# Patient Record
Sex: FEMALE | Race: WHITE | Employment: OTHER | URBAN - METROPOLITAN AREA
[De-identification: names, ages, dates, MRNs, and addresses within clinical notes are randomized per-mention and may not be internally consistent; named-entity substitution may affect disease eponyms.]

---

## 2017-10-19 ENCOUNTER — HOSPITAL ENCOUNTER (OUTPATIENT)
Dept: MRI IMAGING | Age: 77
Discharge: HOME | End: 2017-10-19
Attending: ORTHOPAEDIC SURGERY
Payer: MEDICARE

## 2017-10-19 DIAGNOSIS — M54.41 ACUTE RIGHT-SIDED BACK PAIN WITH SCIATICA: ICD-10-CM

## 2017-10-19 PROCEDURE — 72148 MRI LUMBAR SPINE W/O DYE: CPT

## 2018-02-05 ENCOUNTER — HOSPITAL ENCOUNTER (OUTPATIENT)
Dept: LAB | Age: 78
Discharge: HOME | End: 2018-02-05

## 2019-03-09 ENCOUNTER — HOSPITAL ENCOUNTER (EMERGENCY)
Age: 79
Discharge: HOME | End: 2019-03-09
Attending: EMERGENCY MEDICINE | Admitting: EMERGENCY MEDICINE
Payer: MEDICARE

## 2019-03-09 ENCOUNTER — APPOINTMENT (OUTPATIENT)
Dept: GENERAL RADIOLOGY | Age: 79
End: 2019-03-09
Attending: EMERGENCY MEDICINE
Payer: MEDICARE

## 2019-03-09 ENCOUNTER — APPOINTMENT (OUTPATIENT)
Dept: CT IMAGING | Age: 79
End: 2019-03-09
Attending: EMERGENCY MEDICINE
Payer: MEDICARE

## 2019-03-09 VITALS
RESPIRATION RATE: 20 BRPM | WEIGHT: 138 LBS | HEIGHT: 64 IN | TEMPERATURE: 97.8 F | SYSTOLIC BLOOD PRESSURE: 138 MMHG | OXYGEN SATURATION: 91 % | DIASTOLIC BLOOD PRESSURE: 71 MMHG | BODY MASS INDEX: 23.56 KG/M2 | HEART RATE: 103 BPM

## 2019-03-09 DIAGNOSIS — N30.00 ACUTE CYSTITIS WITHOUT HEMATURIA: Primary | ICD-10-CM

## 2019-03-09 DIAGNOSIS — R41.0 TRANSIENT CONFUSION: ICD-10-CM

## 2019-03-09 LAB
ALBUMIN SERPL-MCNC: 3.1 G/DL (ref 3.5–5)
ALBUMIN/GLOB SERPL: 0.9 {RATIO} (ref 1.1–2.2)
ALP SERPL-CCNC: 43 U/L (ref 45–117)
ALT SERPL-CCNC: 25 U/L (ref 12–78)
ANION GAP SERPL CALC-SCNC: 10 MMOL/L (ref 5–15)
APPEARANCE UR: ABNORMAL
AST SERPL-CCNC: 32 U/L (ref 15–37)
ATRIAL RATE: 98 BPM
BACTERIA URNS QL MICRO: ABNORMAL /HPF
BASOPHILS # BLD: 0 K/UL (ref 0–0.1)
BASOPHILS NFR BLD: 0 % (ref 0–1)
BILIRUB SERPL-MCNC: 0.2 MG/DL (ref 0.2–1)
BILIRUB UR QL: NEGATIVE
BUN SERPL-MCNC: 12 MG/DL (ref 6–20)
BUN/CREAT SERPL: 17 (ref 12–20)
CALCIUM SERPL-MCNC: 7.7 MG/DL (ref 8.5–10.1)
CALCULATED P AXIS, ECG09: 47 DEGREES
CALCULATED R AXIS, ECG10: -41 DEGREES
CALCULATED T AXIS, ECG11: 106 DEGREES
CHLORIDE SERPL-SCNC: 102 MMOL/L (ref 97–108)
CO2 SERPL-SCNC: 23 MMOL/L (ref 21–32)
COLOR UR: ABNORMAL
CREAT SERPL-MCNC: 0.7 MG/DL (ref 0.55–1.02)
DIAGNOSIS, 93000: NORMAL
DIFFERENTIAL METHOD BLD: ABNORMAL
EOSINOPHIL # BLD: 0 K/UL (ref 0–0.4)
EOSINOPHIL NFR BLD: 0 % (ref 0–7)
EPITH CASTS URNS QL MICRO: ABNORMAL /LPF
ERYTHROCYTE [DISTWIDTH] IN BLOOD BY AUTOMATED COUNT: 13.2 % (ref 11.5–14.5)
GLOBULIN SER CALC-MCNC: 3.6 G/DL (ref 2–4)
GLUCOSE SERPL-MCNC: 116 MG/DL (ref 65–100)
GLUCOSE UR STRIP.AUTO-MCNC: NEGATIVE MG/DL
HCT VFR BLD AUTO: 32.5 % (ref 35–47)
HGB BLD-MCNC: 11.4 G/DL (ref 11.5–16)
HGB UR QL STRIP: NEGATIVE
HYALINE CASTS URNS QL MICRO: ABNORMAL /LPF (ref 0–5)
IMM GRANULOCYTES # BLD AUTO: 0 K/UL (ref 0–0.04)
IMM GRANULOCYTES NFR BLD AUTO: 0 % (ref 0–0.5)
KETONES UR QL STRIP.AUTO: NEGATIVE MG/DL
LEUKOCYTE ESTERASE UR QL STRIP.AUTO: ABNORMAL
LYMPHOCYTES # BLD: 1 K/UL (ref 0.8–3.5)
LYMPHOCYTES NFR BLD: 19 % (ref 12–49)
MCH RBC QN AUTO: 32.3 PG (ref 26–34)
MCHC RBC AUTO-ENTMCNC: 35.1 G/DL (ref 30–36.5)
MCV RBC AUTO: 92.1 FL (ref 80–99)
MONOCYTES # BLD: 0.9 K/UL (ref 0–1)
MONOCYTES NFR BLD: 17 % (ref 5–13)
NEUTS SEG # BLD: 3.2 K/UL (ref 1.8–8)
NEUTS SEG NFR BLD: 64 % (ref 32–75)
NITRITE UR QL STRIP.AUTO: POSITIVE
NRBC # BLD: 0 K/UL (ref 0–0.01)
NRBC BLD-RTO: 0 PER 100 WBC
P-R INTERVAL, ECG05: 160 MS
PH UR STRIP: 5.5 [PH] (ref 5–8)
PLATELET # BLD AUTO: 156 K/UL (ref 150–400)
PMV BLD AUTO: 11.3 FL (ref 8.9–12.9)
POTASSIUM SERPL-SCNC: 3.3 MMOL/L (ref 3.5–5.1)
PROT SERPL-MCNC: 6.7 G/DL (ref 6.4–8.2)
PROT UR STRIP-MCNC: NEGATIVE MG/DL
Q-T INTERVAL, ECG07: 404 MS
QRS DURATION, ECG06: 142 MS
QTC CALCULATION (BEZET), ECG08: 515 MS
RBC # BLD AUTO: 3.53 M/UL (ref 3.8–5.2)
RBC #/AREA URNS HPF: ABNORMAL /HPF (ref 0–5)
SODIUM SERPL-SCNC: 135 MMOL/L (ref 136–145)
SP GR UR REFRACTOMETRY: 1.01 (ref 1–1.03)
TROPONIN I SERPL-MCNC: <0.05 NG/ML
UA: UC IF INDICATED,UAUC: ABNORMAL
UROBILINOGEN UR QL STRIP.AUTO: 0.2 EU/DL (ref 0.2–1)
VENTRICULAR RATE, ECG03: 98 BPM
WBC # BLD AUTO: 5.1 K/UL (ref 3.6–11)
WBC URNS QL MICRO: ABNORMAL /HPF (ref 0–4)

## 2019-03-09 PROCEDURE — 81001 URINALYSIS AUTO W/SCOPE: CPT

## 2019-03-09 PROCEDURE — 70450 CT HEAD/BRAIN W/O DYE: CPT

## 2019-03-09 PROCEDURE — 71045 X-RAY EXAM CHEST 1 VIEW: CPT

## 2019-03-09 PROCEDURE — 74011250636 HC RX REV CODE- 250/636: Performed by: EMERGENCY MEDICINE

## 2019-03-09 PROCEDURE — 87186 SC STD MICRODIL/AGAR DIL: CPT

## 2019-03-09 PROCEDURE — 84484 ASSAY OF TROPONIN QUANT: CPT

## 2019-03-09 PROCEDURE — 80053 COMPREHEN METABOLIC PANEL: CPT

## 2019-03-09 PROCEDURE — 93005 ELECTROCARDIOGRAM TRACING: CPT

## 2019-03-09 PROCEDURE — 74011000258 HC RX REV CODE- 258: Performed by: EMERGENCY MEDICINE

## 2019-03-09 PROCEDURE — 87086 URINE CULTURE/COLONY COUNT: CPT

## 2019-03-09 PROCEDURE — 85025 COMPLETE CBC W/AUTO DIFF WBC: CPT

## 2019-03-09 PROCEDURE — 36415 COLL VENOUS BLD VENIPUNCTURE: CPT

## 2019-03-09 PROCEDURE — 87077 CULTURE AEROBIC IDENTIFY: CPT

## 2019-03-09 PROCEDURE — 74011250637 HC RX REV CODE- 250/637: Performed by: EMERGENCY MEDICINE

## 2019-03-09 PROCEDURE — 96365 THER/PROPH/DIAG IV INF INIT: CPT

## 2019-03-09 PROCEDURE — 99285 EMERGENCY DEPT VISIT HI MDM: CPT

## 2019-03-09 RX ORDER — CEPHALEXIN 500 MG/1
500 CAPSULE ORAL 4 TIMES DAILY
Qty: 28 CAP | Refills: 0 | Status: SHIPPED | OUTPATIENT
Start: 2019-03-09 | End: 2019-03-16

## 2019-03-09 RX ORDER — METOPROLOL TARTRATE 25 MG/1
25 TABLET, FILM COATED ORAL
Status: COMPLETED | OUTPATIENT
Start: 2019-03-09 | End: 2019-03-09

## 2019-03-09 RX ORDER — CEFOTETAN DISODIUM 1 G/10ML
1 INJECTION, POWDER, FOR SOLUTION INTRAMUSCULAR; INTRAVENOUS
Status: DISCONTINUED | OUTPATIENT
Start: 2019-03-09 | End: 2019-03-09 | Stop reason: HOSPADM

## 2019-03-09 RX ADMIN — CEFOXITIN SODIUM 1 G: 1 POWDER, FOR SOLUTION INTRAVENOUS at 07:36

## 2019-03-09 RX ADMIN — METOPROLOL TARTRATE 25 MG: 25 TABLET ORAL at 06:24

## 2019-03-09 NOTE — ED NOTES
MD Melvi Hutchinson have reviewed discharge instructions with the patient and parent . The patient and parent verbalized understanding. Pt leaving via w/c and  driving pt home.

## 2019-03-09 NOTE — DISCHARGE INSTRUCTIONS
Patient Education        Urinary Tract Infection in Women: Care Instructions  Your Care Instructions    A urinary tract infection, or UTI, is a general term for an infection anywhere between the kidneys and the urethra (where urine comes out). Most UTIs are bladder infections. They often cause pain or burning when you urinate. UTIs are caused by bacteria and can be cured with antibiotics. Be sure to complete your treatment so that the infection goes away. Follow-up care is a key part of your treatment and safety. Be sure to make and go to all appointments, and call your doctor if you are having problems. It's also a good idea to know your test results and keep a list of the medicines you take. How can you care for yourself at home? · Take your antibiotics as directed. Do not stop taking them just because you feel better. You need to take the full course of antibiotics. · Drink extra water and other fluids for the next day or two. This may help wash out the bacteria that are causing the infection. (If you have kidney, heart, or liver disease and have to limit fluids, talk with your doctor before you increase your fluid intake.)  · Avoid drinks that are carbonated or have caffeine. They can irritate the bladder. · Urinate often. Try to empty your bladder each time. · To relieve pain, take a hot bath or lay a heating pad set on low over your lower belly or genital area. Never go to sleep with a heating pad in place. To prevent UTIs  · Drink plenty of water each day. This helps you urinate often, which clears bacteria from your system. (If you have kidney, heart, or liver disease and have to limit fluids, talk with your doctor before you increase your fluid intake.)  · Urinate when you need to. · Urinate right after you have sex. · Change sanitary pads often. · Avoid douches, bubble baths, feminine hygiene sprays, and other feminine hygiene products that have deodorants.   · After going to the bathroom, wipe from front to back. When should you call for help? Call your doctor now or seek immediate medical care if:    · Symptoms such as fever, chills, nausea, or vomiting get worse or appear for the first time.     · You have new pain in your back just below your rib cage. This is called flank pain.     · There is new blood or pus in your urine.     · You have any problems with your antibiotic medicine.    Watch closely for changes in your health, and be sure to contact your doctor if:    · You are not getting better after taking an antibiotic for 2 days.     · Your symptoms go away but then come back. Where can you learn more? Go to http://rosanne-corrie.info/. Enter C958 in the search box to learn more about \"Urinary Tract Infection in Women: Care Instructions. \"  Current as of: March 20, 2018  Content Version: 11.9  © 1291-2276 [employer]. Care instructions adapted under license by [employer] (which disclaims liability or warranty for this information). If you have questions about a medical condition or this instruction, always ask your healthcare professional. [place] [address] any warranty or liability for your use of this information. Patient Education        Learning About Delirium  What is delirium? Delirium is a sudden change in mental condition. It leads to confusion and unusual behavior. Delirium is also called acute confusional state. Delirium affects all age groups. It can result from problems that affect the brain, such as stroke. It can also happen after an infection or when using certain medicines. Pain may also cause the problem. Seeing delirium in a loved one can be scary and sad. But it will go away most of the time. It usually lasts hours to days. The doctor will look for a cause and take steps to treat it and keep your loved one comfortable. What are the symptoms?   Symptoms of delirium usually develop over several hours to a few days. Symptoms may change and be more or less severe. Symptoms include:  · A short attention span. · Confusion. This is not knowing where you are, what time it is, or who others are. · Hallucinations. This usually is seeing or hearing things that are not really there. · Delusions. This is believing things that aren't true. · Illusions. This is making a mistake in what you think is real. For example, you think a child is crying, but it's a pillow. · Disorganized thinking. How is delirium treated? The doctor may:  · Find and treat the cause. This could be:  ? Not getting enough fluids. ? An infection. ? A medicine or combination of medicines. ? Another medical problem. · Prescribe a medicine. · Make the hospital room as quiet as possible. You may be able to help your loved one by being present and talking to and touching him or her. Follow-up care is a key part of your treatment and safety. Be sure to make and go to all appointments, and call your doctor if you are having problems. It's also a good idea to know your test results and keep a list of the medicines you take. Where can you learn more? Go to http://rosanne-corrie.info/. Enter B624 in the search box to learn more about \"Learning About Delirium. \"  Current as of: September 11, 2018  Content Version: 11.9  © 3379-0802 [employer]. Care instructions adapted under license by [employer] (which disclaims liability or warranty for this information). If you have questions about a medical condition or this instruction, always ask your healthcare professional. [place] [address] any warranty or liability for your use of this information.

## 2019-03-09 NOTE — ED PROVIDER NOTES
EMERGENCY DEPARTMENT HISTORY AND PHYSICAL EXAM           Date: 3/9/2019  Patient Name: Cornelio Ardon    History of Presenting Illness     Chief Complaint   Patient presents with    Fatigue       History Provided By: Patient and Patient's     HPI: Cornelio Ardon is a [de-identified] y.o. female, pmhx TIA, breast cancer, HTN, high cholesterol, GERD, arthritis who presents via EMS to the ED c/o sudden onset of confusion just PTA in which pt was confused about her location. Pt is unsure how long the episode lasted. Per , the pt was disoriented and in a \"dream-like state. \" He relays a history of TIA. Patient additionally c/o generalized weakness, fatigue, congestion and cough. She specifically denies any recent chills, nausea, vomiting, diarrhea, abd pain, CP, SOB, urinary sxs, changes in BM, or headache. PCP: Rogelio Snyder MD     Allergies: Losartan, augmentin, contrast agent, tetracycline   PMHx: Significant for TIA, breast cancer, HTN, high cholesterol, GERD, arthritis  PSHx: Significant for back surgery, tubal ligation, mastectomy, lithotripsy, D&C  Social Hx: -tobacco  -EtOH , -Illicit Drugs    There are no other complaints, changes, or physical findings at this time. Current Facility-Administered Medications   Medication Dose Route Frequency Provider Last Rate Last Dose    cefoTEtan (CEFOTAN) injection 1 g  1 g IntraVENous NOW Rashad Bishop MD        cefOXitin (MEFOXIN) 1 g in 0.9% sodium chloride (MBP/ADV) 50 mL  1 g IntraVENous ON CALL Rashad Bishop MD         Current Outpatient Medications   Medication Sig Dispense Refill    cephALEXin (KEFLEX) 500 mg capsule Take 1 Cap by mouth four (4) times daily for 7 days. 28 Cap 0    guaiFENesin 200 mg/5 mL liqd Take 5 mL by mouth every four (4) hours as needed for Cough. 118 mL 0    esomeprazole (NEXIUM) 40 mg capsule Take  by mouth daily (with dinner).  MULTIVITAMIN PO Take  by mouth daily.       B.infantis-B.ani-B.long-B.bifi (PROBIOTIC 4X) 10-15 mg TbEC Take  by mouth daily.  amoxicillin 500 mg tab Take  by mouth. 500 MG; TAKE 4 TABS PRIOR TO DENTAL WORK      hydrochlorothiazide (HYDRODIURIL) 25 mg tablet Take 25 mg by mouth daily.  metoprolol (LOPRESSOR) 50 mg tablet Take 1 Tab by mouth two (2) times a day. (Patient taking differently: Take 25 mg by mouth two (2) times a day.) 20 Tab 0    aspirin 81 mg tablet Take 81 mg by mouth daily.  calcium carbonate 1,000 mg Tab Take 1 Tab by mouth daily.  OMEGA-3/DHA/EPA/FISH OIL (FISH OIL HIGH POTENCY PO) Take  by mouth two (2) times a day.          Past History     Past Medical History:  Past Medical History:   Diagnosis Date    Arthritis     SPINE    Cancer ([facility] [address]     right breast ; [facility]    Environmental allergies     GERD (gastroesophageal reflux disease)     Hypercholesterolemia     Hypertension     Nausea & vomiting     Other ill-defined conditions(799.89)     high cholesterol    Other ill-defined conditions(799.89)     NO BP/VENIPUNCTURES RIGHT ARM (POST LYMPH NODE DISSECTION)    Other ill-defined conditions(799.89)     MITRAL VALVE PROLAPSE    Stroke ([facility] [address] [facility]     TIA 2009  (TAKING ASA)       Past Surgical History:  Past Surgical History:   Procedure Laterality Date    HX BACK SURGERY  1998    l4-l5  ([facility] [place]    HX BREAST LUMPECTOMY  1999    right    HX BREAST RECONSTRUCTION Right 8/27/2014    Revision Right Reconstructed Breast performed by Mj Aguilar MD at [address] [place] [address] Bilateral 2/20/2015    REVISION RIGHT RECONSTRUCTED BREAST/REMOVE TISSUE EXPANDERS/PLACE IMPLANT RIGHT/MASTOPEXY LEFT performed by Mj Aguilar MD at [address] HX GI  2006    COLONOSCOPY    HX LITHOTRIPSY  1991    HX MASTECTOMY Right 2014    HX ORTHOPAEDIC  2005    left bunionectomy    HX TUBAL LIGATION      HX UROLOGICAL      kidney stone       Family History:  Family History   Problem Relation Age of Onset    Heart Disease Mother     Heart Disease Brother     Cancer Brother         PROSTATE    Heart Disease Maternal Aunt     Cancer Father         LUNG--ASBESTOS EXPOSURE    Anesth Problems Neg Hx     Heart Disease Brother        Social History:  Social History     Tobacco Use    Smoking status: Never Smoker    Smokeless tobacco: Never Used   Substance Use Topics    Alcohol use: No    Drug use: No       Allergies: Allergies   Allergen Reactions    Losartan Anaphylaxis    Other Medication Anaphylaxis     Allergy shot     Augmentin [Amoxicillin-Pot Clavulanate] Rash    Contrast Agent [Iodine] Hives     Face and neck red after adminstration    Tetracycline Rash         Review of Systems   Review of Systems   Constitutional: Positive for fatigue. Negative for fever. HENT: Positive for congestion. Eyes: Negative. Respiratory: Positive for cough. Negative for shortness of breath. Cardiovascular: Negative for chest pain. Gastrointestinal: Negative for abdominal pain and nausea. Endocrine: Negative. Genitourinary: Negative. Negative for difficulty urinating, dysuria and hematuria. Musculoskeletal: Negative. Skin: Negative. Neurological: Positive for weakness. Psychiatric/Behavioral: Positive for confusion. Negative for suicidal ideas. All other systems reviewed and are negative. Physical Exam   Physical Exam   Constitutional: She is oriented to person, place, and time. She appears well-developed and well-nourished. No distress. HENT:   Head: Normocephalic and atraumatic. Nose: Nose normal.   Eyes: Conjunctivae and EOM are normal. Pupils are equal, round, and reactive to light. No scleral icterus. Neck: Normal range of motion. No tracheal deviation present. Cardiovascular: Normal rate, regular rhythm, normal heart sounds and intact distal pulses. Exam reveals no friction rub. No murmur heard.   Pulmonary/Chest: Effort normal and breath sounds normal. No stridor. No respiratory distress. She has no wheezes. She has no rales. Abdominal: Soft. Bowel sounds are normal. She exhibits no distension. There is no tenderness. There is no rebound. Musculoskeletal: Normal range of motion. She exhibits no tenderness. Neurological: She is alert and oriented to person, place, and time. She is not disoriented. No cranial nerve deficit or sensory deficit. She exhibits normal muscle tone. Coordination and gait normal. GCS eye subscore is 4. GCS verbal subscore is 5. GCS motor subscore is 6. Skin: Skin is warm and dry. No rash noted. She is not diaphoretic. Psychiatric: She has a normal mood and affect. Her speech is normal and behavior is normal. Judgment and thought content normal. Cognition and memory are normal.   Nursing note and vitals reviewed. Diagnostic Study Results     Labs -  Recent Results (from the past 12 hour(s))   EKG, 12 LEAD, INITIAL    Collection Time: 03/09/19  3:10 AM   Result Value Ref Range    Ventricular Rate 98 BPM    Atrial Rate 98 BPM    P-R Interval 160 ms    QRS Duration 142 ms    Q-T Interval 404 ms    QTC Calculation (Bezet) 515 ms    Calculated P Axis 47 degrees    Calculated R Axis -41 degrees    Calculated T Axis 106 degrees    Diagnosis       Normal sinus rhythm  Left axis deviation  Left bundle branch block  When compared with ECG of 22-AUG-2014 10:16,  Vent.  rate has increased BY  38 BPM  T wave inversion less evident in Lateral leads  QT has lengthened     CBC WITH AUTOMATED DIFF    Collection Time: 03/09/19  3:20 AM   Result Value Ref Range    WBC 5.1 3.6 - 11.0 K/uL    RBC 3.53 (L) 3.80 - 5.20 M/uL    HGB 11.4 (L) 11.5 - 16.0 g/dL    HCT 32.5 (L) 35.0 - 47.0 %    MCV 92.1 80.0 - 99.0 FL    MCH 32.3 26.0 - 34.0 PG    MCHC 35.1 30.0 - 36.5 g/dL    RDW 13.2 11.5 - 14.5 %    PLATELET 761 289 - 379 K/uL    MPV 11.3 8.9 - 12.9 FL    NRBC 0.0 0  WBC    ABSOLUTE NRBC 0.00 0.00 - 0.01 K/uL    NEUTROPHILS 64 32 - 75 % LYMPHOCYTES 19 12 - 49 %    MONOCYTES 17 (H) 5 - 13 %    EOSINOPHILS 0 0 - 7 %    BASOPHILS 0 0 - 1 %    IMMATURE GRANULOCYTES 0 0.0 - 0.5 %    ABS. NEUTROPHILS 3.2 1.8 - 8.0 K/UL    ABS. LYMPHOCYTES 1.0 0.8 - 3.5 K/UL    ABS. MONOCYTES 0.9 0.0 - 1.0 K/UL    ABS. EOSINOPHILS 0.0 0.0 - 0.4 K/UL    ABS. BASOPHILS 0.0 0.0 - 0.1 K/UL    ABS. IMM. GRANS. 0.0 0.00 - 0.04 K/UL    DF AUTOMATED     METABOLIC PANEL, COMPREHENSIVE    Collection Time: 03/09/19  3:20 AM   Result Value Ref Range    Sodium 135 (L) 136 - 145 mmol/L    Potassium 3.3 (L) 3.5 - 5.1 mmol/L    Chloride 102 97 - 108 mmol/L    CO2 23 21 - 32 mmol/L    Anion gap 10 5 - 15 mmol/L    Glucose 116 (H) 65 - 100 mg/dL    BUN 12 6 - 20 MG/DL    Creatinine 0.70 0.55 - 1.02 MG/DL    BUN/Creatinine ratio 17 12 - 20      GFR est AA >60 >60 ml/min/1.73m2    GFR est non-AA >60 >60 ml/min/1.73m2    Calcium 7.7 (L) 8.5 - 10.1 MG/DL    Bilirubin, total 0.2 0.2 - 1.0 MG/DL    ALT (SGPT) 25 12 - 78 U/L    AST (SGOT) 32 15 - 37 U/L    Alk.  phosphatase 43 (L) 45 - 117 U/L    Protein, total 6.7 6.4 - 8.2 g/dL    Albumin 3.1 (L) 3.5 - 5.0 g/dL    Globulin 3.6 2.0 - 4.0 g/dL    A-G Ratio 0.9 (L) 1.1 - 2.2     TROPONIN I    Collection Time: 03/09/19  3:20 AM   Result Value Ref Range    Troponin-I, Qt. <0.05 <0.05 ng/mL   URINALYSIS W/ REFLEX CULTURE    Collection Time: 03/09/19  4:57 AM   Result Value Ref Range    Color YELLOW/STRAW      Appearance CLOUDY (A) CLEAR      Specific gravity 1.011 1.003 - 1.030      pH (UA) 5.5 5.0 - 8.0      Protein NEGATIVE  NEG mg/dL    Glucose NEGATIVE  NEG mg/dL    Ketone NEGATIVE  NEG mg/dL    Bilirubin NEGATIVE  NEG      Blood NEGATIVE  NEG      Urobilinogen 0.2 0.2 - 1.0 EU/dL    Nitrites POSITIVE (A) NEG      Leukocyte Esterase MODERATE (A) NEG      WBC 10-20 0 - 4 /hpf    RBC 0-5 0 - 5 /hpf    Epithelial cells FEW FEW /lpf    Bacteria 4+ (A) NEG /hpf    UA:UC IF INDICATED URINE CULTURE ORDERED (A) CNI      Hyaline cast 2-5 0 - 5 /lpf Radiologic Studies -   CT HEAD WO CONT   Final Result   IMPRESSION: No acute findings. XR CHEST PORT   Final Result   IMPRESSION: Hyperinflated clear lungs. XR CHEST PA LAT    (Results Pending)     CT Results  (Last 48 hours)               03/09/19 0410  CT HEAD WO CONT Final result    Impression:  IMPRESSION: No acute findings. Narrative:  EXAM: CT HEAD WO CONT       INDICATION: Confusion/delirium, altered LOC, unexplained; transient confusion       COMPARISON: 2011. CONTRAST: None. TECHNIQUE: Unenhanced CT of the head was performed using 5 mm images. Brain and   bone windows were generated. CT dose reduction was achieved through use of a   standardized protocol tailored for this examination and automatic exposure   control for dose modulation. FINDINGS:   The ventricles and sulci are enlarged. There is periventricular hypoattenuation   compatible with chronic small vessel ischemic changes. There is no intracranial   hemorrhage, extra-axial collection, mass, mass effect or midline shift. The   basilar cisterns are open. No acute infarct is identified. The bone windows   demonstrate no abnormalities. The visualized portions of the paranasal sinuses   and mastoid air cells are clear. CXR Results  (Last 48 hours)               03/09/19 0303  XR CHEST PORT Final result    Impression:  IMPRESSION: Hyperinflated clear lungs. Narrative:  EXAM: XR CHEST PORT       INDICATION: weakness       COMPARISON: January 20, 2011       FINDINGS: A portable AP radiograph of the chest was obtained at 0252 hours. The   patient is on a cardiac monitor. The lungs are hyperinflated but clear. There is   atherosclerosis of the aorta. The bones and soft tissues are grossly within   normal limits. Medical Decision Making   I am the first provider for this patient.     I reviewed the vital signs, available nursing notes, past medical history, past surgical history, family history and social history. Vital Signs-Reviewed the patient's vital signs. Patient Vitals for the past 12 hrs:   Temp Pulse Resp BP SpO2   03/09/19 0342  (!) 103 20  92 %   03/09/19 0341  (!) 101 18  91 %   03/09/19 0340  (!) 101 16  92 %   03/09/19 0339  (!) 104 23  91 %   03/09/19 0338  (!) 103 21  93 %   03/09/19 0337  (!) 105 17  94 %   03/09/19 0336  (!) 105 17  92 %   03/09/19 0335  (!) 109 15  94 %   03/09/19 0334  (!) 107 19  93 %   03/09/19 0333  (!) 104 18  94 %   03/09/19 0332  (!) 108 14  94 %   03/09/19 0331  (!) 108 14  94 %   03/09/19 0330  (!) 109 15  94 %   03/09/19 0329  (!) 105 13  92 %   03/09/19 0328  (!) 110 13  94 %   03/09/19 0327  (!) 103 17  92 %   03/09/19 0326  (!) 103 18  92 %   03/09/19 0325  (!) 105 14  92 %   03/09/19 0324  (!) 105 12  92 %   03/09/19 0323  (!) 102 21  92 %   03/09/19 0322  (!) 104 16  92 %   03/09/19 0321  (!) 106 22  91 %   03/09/19 0320  (!) 102 19 142/79 92 %   03/09/19 0319  (!) 106 16  92 %   03/09/19 0318  (!) 107 16  93 %   03/09/19 0317  (!) 101 16     03/09/19 0311 97.8 °F (36.6 °C) 99 17 142/79 94 %       Pulse Oximetry Analysis - 94% on RA    Cardiac Monitor:   Rate: 99 bpm  Rhythm: Normal Sinus Rhythm      Records Reviewed: Nursing Notes and Old Medical Records    Provider Notes (Medical Decision Making):     DDX:  Uti, cva, tia, electrolyte abnormality    Plan:  Ekg, labs, cxr, head ct, ua    Impression:  uti    ED Course:   Initial assessment performed. The patients presenting problems have been discussed, and they are in agreement with the care plan formulated and outlined with them. I have encouraged them to ask questions as they arise throughout their visit.     I reviewed our electronic medical record system for any past medical records that were available that may contribute to the patients current condition, the nursing notes and and vital signs from today's visit    Nursing notes will be reviewed as they become available in realtime while the pt has been in the ED. Radha Storey MD      EKG interpretation 2908: NSR with LBBB, L Axis, rate 98; , , QTc 515; no acute ischemia; Radha Storey MD    I personally reviewed pt's imaging. Official read by radiology noted above. Radha Storey MD         PROGRESS NOTE:  5:56 AM  Pt with evidence of UTI on UA, likely cause of transient confusion. Will tx with ctx and re-eval while remainder of w/u completted  Radha Storey MD      7:18 AM  Progress note:  Pt noted to be feeling better, ready for discharge. Discussed lab and imaging findings with pt and/or family, specifically noting uti. Pt will follow up as instructed. All questions have been answered, pt voiced understanding and agreement with plan. If narcotics were prescribed, pt's  record was reviewed and pt was advised not to drive or operate heavy machinery. If abx were prescribed, pt advised that diarrhea and rash are possible side effects of the medications. Specific return precautions provided in addition to instructions for pt to return to the ED immediately should sx worsen at any time. Radha Storey MD      Critical Care Time:   none      Diagnosis     Clinical Impression:   1. Acute cystitis without hematuria    2. Transient confusion        PLAN:  1. Current Discharge Medication List      START taking these medications    Details   cephALEXin (KEFLEX) 500 mg capsule Take 1 Cap by mouth four (4) times daily for 7 days. Qty: 28 Cap, Refills: 0      guaiFENesin 200 mg/5 mL liqd Take 5 mL by mouth every four (4) hours as needed for Cough. Qty: 118 mL, Refills: 0           2.    Follow-up Information     Follow up With Specialties Details Why Contact Info    Elida Miller MD [facility] Schedule an appointment as soon as possible for a visit in 2 days  [place]  [facility]  [phone] Return to ED if worse     Disposition:    7:18 AM  The patient's results have been reviewed with family and/or caregiver. They verbally convey their understanding and agreement of the patient's signs, symptoms, diagnosis, treatment and prognosis and additionally agree to follow up as recommended in the discharge instructions or to return to the Emergency Room should the patient's condition change prior to their follow-up appointment. The family and/or caregiver verbally agrees with the care-plan and all of their questions have been answered. The discharge instructions have also been provided to the them with educational information regarding the patient's diagnosis as well a list of reasons why the patient would want to return to the ER prior to their follow-up appointment should their condition change. Ilia Swanson MD                    Attestations: This note is prepared by Poli Nicholas, acting as Scribe for MD Ilia Patel MD : The scribe's documentation has been prepared under my direction and personally reviewed by me in its entirety. I confirm that the note above accurately reflects all work, treatment, procedures, and medical decision making performed by me. This note will not be viewable in [address]

## 2019-03-11 LAB
BACTERIA SPEC CULT: ABNORMAL
CC UR VC: ABNORMAL
SERVICE CMNT-IMP: ABNORMAL

## 2019-03-21 ENCOUNTER — HOSPITAL ENCOUNTER (EMERGENCY)
Age: 79
Discharge: HOME | End: 2019-03-21
Attending: EMERGENCY MEDICINE
Payer: MEDICARE

## 2019-03-21 ENCOUNTER — APPOINTMENT (OUTPATIENT)
Dept: CT IMAGING | Age: 79
End: 2019-03-21
Attending: EMERGENCY MEDICINE
Payer: MEDICARE

## 2019-03-21 VITALS
DIASTOLIC BLOOD PRESSURE: 68 MMHG | RESPIRATION RATE: 17 BRPM | WEIGHT: 135.8 LBS | HEART RATE: 84 BPM | BODY MASS INDEX: 24.06 KG/M2 | HEIGHT: 63 IN | SYSTOLIC BLOOD PRESSURE: 134 MMHG | OXYGEN SATURATION: 96 % | TEMPERATURE: 98.3 F

## 2019-03-21 DIAGNOSIS — G50.0 TRIGEMINAL NEURALGIA OF LEFT SIDE OF FACE: Primary | ICD-10-CM

## 2019-03-21 LAB
ALBUMIN SERPL-MCNC: 3.2 G/DL (ref 3.5–5)
ALBUMIN/GLOB SERPL: 0.9 {RATIO} (ref 1.1–2.2)
ALP SERPL-CCNC: 40 U/L (ref 45–117)
ALT SERPL-CCNC: 22 U/L (ref 12–78)
ANION GAP SERPL CALC-SCNC: 4 MMOL/L (ref 5–15)
AST SERPL-CCNC: 21 U/L (ref 15–37)
BASOPHILS # BLD: 0 K/UL (ref 0–0.1)
BASOPHILS NFR BLD: 1 % (ref 0–1)
BILIRUB SERPL-MCNC: 0.3 MG/DL (ref 0.2–1)
BUN SERPL-MCNC: 15 MG/DL (ref 6–20)
BUN/CREAT SERPL: 18 (ref 12–20)
CALCIUM SERPL-MCNC: 8.9 MG/DL (ref 8.5–10.1)
CHLORIDE SERPL-SCNC: 104 MMOL/L (ref 97–108)
CO2 SERPL-SCNC: 30 MMOL/L (ref 21–32)
CREAT SERPL-MCNC: 0.83 MG/DL (ref 0.55–1.02)
DIFFERENTIAL METHOD BLD: ABNORMAL
EOSINOPHIL # BLD: 0.5 K/UL (ref 0–0.4)
EOSINOPHIL NFR BLD: 8 % (ref 0–7)
ERYTHROCYTE [DISTWIDTH] IN BLOOD BY AUTOMATED COUNT: 12.7 % (ref 11.5–14.5)
GLOBULIN SER CALC-MCNC: 3.5 G/DL (ref 2–4)
GLUCOSE SERPL-MCNC: 116 MG/DL (ref 65–100)
HCT VFR BLD AUTO: 34.9 % (ref 35–47)
HGB BLD-MCNC: 12 G/DL (ref 11.5–16)
IMM GRANULOCYTES # BLD AUTO: 0 K/UL (ref 0–0.04)
IMM GRANULOCYTES NFR BLD AUTO: 0 % (ref 0–0.5)
LYMPHOCYTES # BLD: 1.8 K/UL (ref 0.8–3.5)
LYMPHOCYTES NFR BLD: 30 % (ref 12–49)
MCH RBC QN AUTO: 31.8 PG (ref 26–34)
MCHC RBC AUTO-ENTMCNC: 34.4 G/DL (ref 30–36.5)
MCV RBC AUTO: 92.6 FL (ref 80–99)
MONOCYTES # BLD: 0.8 K/UL (ref 0–1)
MONOCYTES NFR BLD: 13 % (ref 5–13)
NEUTS SEG # BLD: 3 K/UL (ref 1.8–8)
NEUTS SEG NFR BLD: 48 % (ref 32–75)
NRBC # BLD: 0 K/UL (ref 0–0.01)
NRBC BLD-RTO: 0 PER 100 WBC
PLATELET # BLD AUTO: 240 K/UL (ref 150–400)
PMV BLD AUTO: 10.7 FL (ref 8.9–12.9)
POTASSIUM SERPL-SCNC: 3.8 MMOL/L (ref 3.5–5.1)
PROT SERPL-MCNC: 6.7 G/DL (ref 6.4–8.2)
RBC # BLD AUTO: 3.77 M/UL (ref 3.8–5.2)
SODIUM SERPL-SCNC: 138 MMOL/L (ref 136–145)
WBC # BLD AUTO: 6 K/UL (ref 3.6–11)

## 2019-03-21 PROCEDURE — 80053 COMPREHEN METABOLIC PANEL: CPT

## 2019-03-21 PROCEDURE — 99283 EMERGENCY DEPT VISIT LOW MDM: CPT

## 2019-03-21 PROCEDURE — 36415 COLL VENOUS BLD VENIPUNCTURE: CPT

## 2019-03-21 PROCEDURE — 85025 COMPLETE CBC W/AUTO DIFF WBC: CPT

## 2019-03-21 PROCEDURE — 70450 CT HEAD/BRAIN W/O DYE: CPT

## 2019-03-21 RX ORDER — GABAPENTIN 100 MG/1
100 CAPSULE ORAL 3 TIMES DAILY
Qty: 90 CAP | Refills: 0 | Status: SHIPPED | OUTPATIENT
Start: 2019-03-21 | End: 2020-01-01

## 2019-03-21 RX ORDER — GABAPENTIN 100 MG/1
100 CAPSULE ORAL 3 TIMES DAILY
Qty: 90 CAP | Refills: 0 | Status: SHIPPED | OUTPATIENT
Start: 2019-03-21 | End: 2019-03-21

## 2019-03-21 NOTE — ED NOTES
MD Zach Loja reviewed discharge instructions with the patient and spouse. The patient and spouse verbalized understanding. Patient discharged home with stable vitals. Patient out of ED via wheelchair with discharge instructions in hand. Opportunity for questions and clarification provided. No further complaints noted at this time.

## 2019-03-21 NOTE — DISCHARGE INSTRUCTIONS
Patient Education        Trigeminal Neuralgia: Care Instructions  Your Care Instructions    Trigeminal neuralgia is a problem with the large nerve that brings feeling to your face. It causes a sudden, sharp pain on one side of your face. Just touching your cheek or talking can set off shooting pain toward the ear, eye, or nostril. Living with this pain can be very hard. Some people have long periods when they do not have pain, and then it comes back. Some people have periods of pain often. But medicine or other treatment often can make the pain go away. If you keep having pain, surgery may help. This problem is also called tic douloureux (say \"tik doo-sondra-ROSELIA\"). Follow-up care is a key part of your treatment and safety. Be sure to make and go to all appointments, and call your doctor if you are having problems. It's also a good idea to know your test results and keep a list of the medicines you take. How can you care for yourself at home? · Write down when you have pain and what you were doing when it started. Try to find what causes the pain. Being in a cold wind, yawning, or shaving are examples. Avoid or limit these triggers if you can. · Be safe with medicines. Take your medicines exactly as prescribed. ? Your doctor may have prescribed medicines used to treat depression and seizures. They can reduce your pain, help you sleep better, and improve your mood. ? Call your doctor if you think you are having a problem with your medicine. You will get more details on the specific medicines your doctor prescribes. ? If you are not taking a prescription pain medicine, take an over-the-counter medicine such as acetaminophen (Tylenol), ibuprofen (Advil, Motrin), or naproxen (Aleve). Read and follow all instructions on the label. ? Do not take two or more pain medicines at the same time unless the doctor told you to. Many pain medicines have acetaminophen, which is Tylenol.  Too much acetaminophen (Tylenol) can be harmful. · Reduce stress in your life. Ask your doctor about ways to relax. These may include breathing exercises and massage. · Think about joining a support group with other people who have this problem. These groups can give comfort and information about what to do to feel better. Your doctor can tell you how to find a support group. When should you call for help? Call your doctor now or seek immediate medical care if:    · You have severe pain that you can't control.    Watch closely for changes in your health, and be sure to contact your doctor if:    · You are not able to sleep because of the pain.     · You do not get better as expected. Where can you learn more? Go to http://rosanne-corrie.info/. Enter R378 in the search box to learn more about \"Trigeminal Neuralgia: Care Instructions. \"  Current as of: September 23, 2018  Content Version: 11.9  © 6532-5058 [employer]. Care instructions adapted under license by [employer] (which disclaims liability or warranty for this information). If you have questions about a medical condition or this instruction, always ask your healthcare professional. [address] any warranty or liability for your use of this information.

## 2019-03-21 NOTE — ED NOTES
Pt presents to ED with family r/ t worsening head pain on the left that the pt describes as not a headache. Pt rates the pain a 7/10 or worse, comes and goes. Pt states pain and pressure have been worsening over last several days.

## 2019-03-21 NOTE — ED NOTES
Pt resting in bed with family at bedside. Pt continues to have pain in the left side of the head and face that is worsened by talking. Pt states pain comes and goes, it lets up if I rest and stay still.

## 2019-03-21 NOTE — ED NOTES
Assumed care of patient from [address]. Patient resting comfortably on stretcher with call bell within reach. Rates pain 7/10. Patient's stretcher in lowest position, with side rails up x1. Patient on the monitor x2. Patient updated on plan of care at this time. Patient's belongings are in close proximity. Patient assessed for all personal needs that may be completed by RN. No further complaints noted at this time. Patient went to CT. Will assess upon return.

## 2019-03-21 NOTE — ED PROVIDER NOTES
EMERGENCY DEPARTMENT HISTORY AND PHYSICAL EXAM 
 
 
Date: 3/21/2019 Patient Name: Sameera Tony History of Presenting Illness Chief Complaint Patient presents with  
 Head Pain  
  pain left ear & left side of head intermittent & worse this pm  
 
 
History Provided By: Patient HPI: Sameera Tony, [de-identified] y.o. female with PMHx significant for hypertension and GERD, presents ambulatory to the ED with cc of left-sided headache for a week. Ms. Lorna Pacheco reports a left-sided headache around her left ear for the past week. Pain is intermittent. Reports a stabbing shocklike pain that lasts a few seconds. No recent falls or trauma. No neck pain or stiffness. No fevers or chills. No rash. No vision changes. No extremity weakness numbness or tingling. No tinnitus no changes in hearing. She initially thought the pain was due to the ear piece of her glasses not fitting properly. No nausea or vomiting. Denies any prior history of trigeminal neuralgia or temporal arteritis. She is otherwise without complaints. PMHx: Significant for hypertension and GERD There are no other complaints, changes, or physical findings at this time. PCP: Reid Payne MD 
 
No current facility-administered medications on file prior to encounter. Current Outpatient Medications on File Prior to Encounter Medication Sig Dispense Refill  guaiFENesin 200 mg/5 mL liqd Take 5 mL by mouth every four (4) hours as needed for Cough. 118 mL 0  
 esomeprazole (NEXIUM) 40 mg capsule Take  by mouth daily (with dinner).  MULTIVITAMIN PO Take  by mouth daily.  B.infantis-B.ani-B.long-B.bifi (PROBIOTIC 4X) 10-15 mg TbEC Take  by mouth daily.  amoxicillin 500 mg tab Take  by mouth. 500 MG; TAKE 4 TABS PRIOR TO DENTAL WORK    
 hydrochlorothiazide (HYDRODIURIL) 25 mg tablet Take 25 mg by mouth daily.       
 metoprolol (LOPRESSOR) 50 mg tablet Take 1 Tab by mouth two (2) times a day. (Patient taking differently: Take 25 mg by mouth two (2) times a day.) 20 Tab 0  
 aspirin 81 mg tablet Take 81 mg by mouth daily.  calcium carbonate 1,000 mg Tab Take 1 Tab by mouth daily.  OMEGA-3/DHA/EPA/FISH OIL (FISH OIL HIGH POTENCY PO) Take  by mouth two (2) times a day. Past History Past Medical History: 
Past Medical History:  
Diagnosis Date  Arthritis SPINE  Cancer ([facility] [address] right breast ; [facility]  Environmental allergies  GERD (gastroesophageal reflux disease)  Hypercholesterolemia  Hypertension  Nausea & vomiting  Other ill-defined conditions(799.89)   
 high cholesterol  Other ill-defined conditions(799.89) NO BP/VENIPUNCTURES RIGHT ARM (POST LYMPH NODE DISSECTION)  Other ill-defined conditions(799.89) MITRAL VALVE PROLAPSE  Stroke ([facility] [address] TIA 2009  (TAKING ASA) Past Surgical History: 
Past Surgical History:  
Procedure Laterality Date  HX BACK SURGERY  1998  
 l4-l5  ([facility] [place] Harpreet Valderrama  
 right  HX BREAST RECONSTRUCTION Right 8/27/2014 Revision Right Reconstructed Breast performed by Rhys Yeager MD at [address] [facility] HX BREAST RECONSTRUCTION Bilateral 2/20/2015 REVISION RIGHT RECONSTRUCTED BREAST/REMOVE TISSUE EXPANDERS/PLACE IMPLANT RIGHT/MASTOPEXY LEFT performed by Rhys Yeager MD at [address] [facility] HX [address] HX GI  2006 COLONOSCOPY  
 HX LITHOTRIPSY  1991  
 HX MASTECTOMY Right 2014  HX ORTHOPAEDIC  2005  
 left bunionectomy  HX TUBAL LIGATION    
 HX UROLOGICAL    
 kidney stone Family History: 
Family History Problem Relation Age of Onset  Heart Disease Mother  Heart Disease Brother  Cancer Brother PROSTATE  Heart Disease Maternal Aunt  Cancer Father LUNG--ASBESTOS EXPOSURE  
 Anesth Problems Neg Hx   
 Heart Disease Brother Social History: 
Social History Tobacco Use  Smoking status: Never Smoker  Smokeless tobacco: Never Used Substance Use Topics  Alcohol use: No  
 Drug use: No  
 
 
Allergies: Allergies Allergen Reactions  Losartan Anaphylaxis  Other Medication Anaphylaxis Allergy shot  Augmentin [Amoxicillin-Pot Clavulanate] Rash  Contrast Agent [Iodine] Hives Face and neck red after adminstration  Tetracycline Rash Review of Systems Review of Systems Constitutional: Negative for activity change, chills and fever. HENT: Negative for congestion and sore throat. Eyes: Negative for pain and redness. Respiratory: Negative for cough, chest tightness and shortness of breath. Cardiovascular: Negative for chest pain and palpitations. Gastrointestinal: Negative for abdominal pain, diarrhea, nausea and vomiting. Genitourinary: Negative for dysuria, frequency and urgency. Musculoskeletal: Negative for back pain and neck pain. Skin: Negative for rash. Neurological: Negative for syncope, light-headedness and headaches. Psychiatric/Behavioral: Negative for confusion. All other systems reviewed and are negative. Physical Exam  
Physical Exam  
Constitutional: She is oriented to person, place, and time. She appears well-developed and well-nourished. No distress. HENT:  
Head: Normocephalic and atraumatic. Nose: Nose normal.  
Mouth/Throat: Oropharynx is clear and moist.  
No temporal artery tenderness bilaterally; no rash. TMs clear bilaterally. No tenderness or swelling around the ear. No mastoid tenderness. Eyes: Pupils are equal, round, and reactive to light. Conjunctivae and EOM are normal. No scleral icterus. Conjunctiva clear bilaterally; Neck: Normal range of motion. Neck supple. No JVD present. No tracheal deviation present. No thyromegaly present. Cardiovascular: Normal rate, regular rhythm and intact distal pulses. Exam reveals no gallop and no friction rub. No murmur heard. Pulmonary/Chest: Effort normal and breath sounds normal. No stridor. No respiratory distress. She has no wheezes. She has no rales. She exhibits no tenderness. Abdominal: Soft. Bowel sounds are normal. She exhibits no distension. There is no tenderness. There is no rebound and no guarding. Musculoskeletal: Normal range of motion. She exhibits no edema or tenderness. Neurological: She is alert and oriented to person, place, and time. She displays normal reflexes. No cranial nerve deficit. She exhibits normal muscle tone. Coordination normal.  
5/5 strength throughout; no past pointing; good heel to shin; negative romberg; holds both arms extended in front of them for 10 seconds without difficulty or drift; lifts legs off of bed without difficulty; no pronator drift; good equal  strength bilaterally; motor and sensory grossly intact; no facial asymmetry;  
  
Skin: Skin is warm and dry. No rash noted. She is not diaphoretic. No erythema. Psychiatric: She has a normal mood and affect. Her behavior is normal.  
Nursing note and vitals reviewed. Diagnostic Study Results Labs - Recent Results (from the past 12 hour(s)) CBC WITH AUTOMATED DIFF Collection Time: 03/21/19  5:32 AM  
Result Value Ref Range WBC 6.0 3.6 - 11.0 K/uL  
 RBC 3.77 (L) 3.80 - 5.20 M/uL  
 HGB 12.0 11.5 - 16.0 g/dL HCT 34.9 (L) 35.0 - 47.0 % MCV 92.6 80.0 - 99.0 FL  
 MCH 31.8 26.0 - 34.0 PG  
 MCHC 34.4 30.0 - 36.5 g/dL  
 RDW 12.7 11.5 - 14.5 % PLATELET 523 788 - 359 K/uL MPV 10.7 8.9 - 12.9 FL  
 NRBC 0.0 0  WBC ABSOLUTE NRBC 0.00 0.00 - 0.01 K/uL NEUTROPHILS 48 32 - 75 % LYMPHOCYTES 30 12 - 49 % MONOCYTES 13 5 - 13 % EOSINOPHILS 8 (H) 0 - 7 % BASOPHILS 1 0 - 1 % IMMATURE GRANULOCYTES 0 0.0 - 0.5 % ABS. NEUTROPHILS 3.0 1.8 - 8.0 K/UL  
 ABS. LYMPHOCYTES 1.8 0.8 - 3.5 K/UL  
 ABS. MONOCYTES 0.8 0.0 - 1.0 K/UL  
 ABS. EOSINOPHILS 0.5 (H) 0.0 - 0.4 K/UL ABS. BASOPHILS 0.0 0.0 - 0.1 K/UL  
 ABS. IMM. GRANS. 0.0 0.00 - 0.04 K/UL  
 DF AUTOMATED METABOLIC PANEL, COMPREHENSIVE Collection Time: 03/21/19  6:27 AM  
Result Value Ref Range Sodium 138 136 - 145 mmol/L Potassium 3.8 3.5 - 5.1 mmol/L Chloride 104 97 - 108 mmol/L  
 CO2 30 21 - 32 mmol/L Anion gap 4 (L) 5 - 15 mmol/L Glucose 116 (H) 65 - 100 mg/dL BUN 15 6 - 20 MG/DL Creatinine 0.83 0.55 - 1.02 MG/DL  
 BUN/Creatinine ratio 18 12 - 20 GFR est AA >60 >60 ml/min/1.73m2 GFR est non-AA >60 >60 ml/min/1.73m2 Calcium 8.9 8.5 - 10.1 MG/DL Bilirubin, total 0.3 0.2 - 1.0 MG/DL  
 ALT (SGPT) 22 12 - 78 U/L  
 AST (SGOT) 21 15 - 37 U/L Alk. phosphatase 40 (L) 45 - 117 U/L Protein, total 6.7 6.4 - 8.2 g/dL Albumin 3.2 (L) 3.5 - 5.0 g/dL Globulin 3.5 2.0 - 4.0 g/dL A-G Ratio 0.9 (L) 1.1 - 2.2 Radiologic Studies -  
CT HEAD WO CONT Final Result IMPRESSION: No acute intracranial disease. CT Results  (Last 48 hours) 03/21/19 0721  CT HEAD WO CONT Final result Impression:  IMPRESSION: No acute intracranial disease. Narrative:  EXAM: Head CT without Contrast:   
CT dose reduction was achieved through use of a standardized protocol tailored  
for this examination and automatic exposure control for dose modulation. INDICATION:  Headache, trigeminal distribution; new left sided headache Unenhanced Head CT shows no mass, bleed, shift, hydrocephalus or extra-axial  
fluid collection. No acute infarct is apparent. Cerebral white matter  
hypodensity is noted, favoring chronic microangiopathy. Bone windows are  
unremarkable. CXR Results  (Last 48 hours) None Medical Decision Making I am the first provider for this patient. I reviewed the vital signs, available nursing notes, past medical history, past surgical history, family history and social history. Vital Signs-Reviewed the patient's vital signs. Patient Vitals for the past 12 hrs: 
 Temp Pulse Resp BP SpO2  
03/21/19 0700   17 157/82 96 % 03/21/19 0630   18 171/78 97 % 03/21/19 0602   18 156/82 97 % 03/21/19 0440 98.3 °F (36.8 °C) 84 20 (!) 171/95 96 % Records Reviewed: Nursing Notes Provider Notes (Medical Decision Making): DDX: Trigeminal neuralgia, tension headache, temporal arteritis, ICH. ED Course:  
Initial assessment performed. The patients presenting problems have been discussed, and they are in agreement with the care plan formulated and outlined with them. I have encouraged them to ask questions as they arise throughout their visit. PROGRESS NOTE 
08:14 Pt reevaluated. Suspect trigeminal neuralgia. CT head without acute findings. CBC CMP unremarkable. Noted temporal artery tenderness or suspicion of temporal arteritis. Discharge of trial of Neurontin with PCP follow-up. No neuro deficits on exam. 
Written by  Whit Murguia MD  
 
 
 
 
Critical Care Time:  
0 Disposition: 
Discharge home PLAN: 
1. Current Discharge Medication List  
  
START taking these medications Details  
gabapentin (NEURONTIN) 100 mg capsule Take 1 Cap by mouth three (3) times daily. Qty: 90 Cap, Refills: 0  
  
  
 
2. Follow-up Information Follow up With Specialties Details Why Contact Info Juni Conn MD Family Practice Schedule an appointment as soon as possible for a visit in 1 day  [address] 
[phone] \[facility]\"" EMERGENCY DEPT Emergency Medicine Go in 1 day If symptoms worsen [address] [place] [address] Drive [address] [facility] 
[phone] Return to ED if worse Diagnosis Clinical Impression: 1. Trigeminal neuralgia of left side of face

## 2019-04-15 ENCOUNTER — HOSPITAL ENCOUNTER (OUTPATIENT)
Dept: MAMMOGRAPHY | Age: 79
Discharge: HOME | End: 2019-04-15
Attending: FAMILY MEDICINE
Payer: MEDICARE

## 2019-04-15 DIAGNOSIS — M81.0 OSTEOPOROSIS: ICD-10-CM

## 2019-04-15 PROCEDURE — 77080 DXA BONE DENSITY AXIAL: CPT

## 2019-11-17 ENCOUNTER — HOSPITAL ENCOUNTER (EMERGENCY)
Age: 79
Discharge: HOME | End: 2019-11-17
Attending: EMERGENCY MEDICINE
Payer: MEDICARE

## 2019-11-17 VITALS
HEART RATE: 92 BPM | WEIGHT: 137 LBS | OXYGEN SATURATION: 95 % | BODY MASS INDEX: 24.27 KG/M2 | DIASTOLIC BLOOD PRESSURE: 76 MMHG | SYSTOLIC BLOOD PRESSURE: 166 MMHG | TEMPERATURE: 98.1 F | HEIGHT: 63 IN | RESPIRATION RATE: 18 BRPM

## 2019-11-17 DIAGNOSIS — E86.0 DEHYDRATION: ICD-10-CM

## 2019-11-17 DIAGNOSIS — R55 NEAR SYNCOPE: ICD-10-CM

## 2019-11-17 DIAGNOSIS — R73.9 HYPERGLYCEMIA: ICD-10-CM

## 2019-11-17 DIAGNOSIS — N39.0 URINARY TRACT INFECTION WITHOUT HEMATURIA, SITE UNSPECIFIED: Primary | ICD-10-CM

## 2019-11-17 DIAGNOSIS — R11.10 ACUTE VOMITING: ICD-10-CM

## 2019-11-17 LAB
ALBUMIN SERPL-MCNC: 3.3 G/DL (ref 3.5–5)
ALBUMIN/GLOB SERPL: 0.9 {RATIO} (ref 1.1–2.2)
ALP SERPL-CCNC: 68 U/L (ref 45–117)
ALT SERPL-CCNC: 27 U/L (ref 12–78)
ANION GAP SERPL CALC-SCNC: 4 MMOL/L (ref 5–15)
APPEARANCE UR: CLEAR
AST SERPL-CCNC: 22 U/L (ref 15–37)
BACTERIA URNS QL MICRO: NEGATIVE /HPF
BASOPHILS # BLD: 0 K/UL (ref 0–0.1)
BASOPHILS NFR BLD: 1 % (ref 0–1)
BILIRUB SERPL-MCNC: 0.3 MG/DL (ref 0.2–1)
BILIRUB UR QL: NEGATIVE
BUN SERPL-MCNC: 19 MG/DL (ref 6–20)
BUN/CREAT SERPL: 19 (ref 12–20)
CALCIUM SERPL-MCNC: 8.5 MG/DL (ref 8.5–10.1)
CHLORIDE SERPL-SCNC: 105 MMOL/L (ref 97–108)
CO2 SERPL-SCNC: 29 MMOL/L (ref 21–32)
COLOR UR: ABNORMAL
CREAT SERPL-MCNC: 1 MG/DL (ref 0.55–1.02)
DIFFERENTIAL METHOD BLD: ABNORMAL
EOSINOPHIL # BLD: 0.4 K/UL (ref 0–0.4)
EOSINOPHIL NFR BLD: 9 % (ref 0–7)
EPITH CASTS URNS QL MICRO: ABNORMAL /LPF
ERYTHROCYTE [DISTWIDTH] IN BLOOD BY AUTOMATED COUNT: 12.9 % (ref 11.5–14.5)
GLOBULIN SER CALC-MCNC: 3.7 G/DL (ref 2–4)
GLUCOSE SERPL-MCNC: 163 MG/DL (ref 65–100)
GLUCOSE UR STRIP.AUTO-MCNC: NEGATIVE MG/DL
HCT VFR BLD AUTO: 34.1 % (ref 35–47)
HGB BLD-MCNC: 11.3 G/DL (ref 11.5–16)
HGB UR QL STRIP: NEGATIVE
IMM GRANULOCYTES # BLD AUTO: 0 K/UL (ref 0–0.04)
IMM GRANULOCYTES NFR BLD AUTO: 0 % (ref 0–0.5)
KETONES UR QL STRIP.AUTO: NEGATIVE MG/DL
LEUKOCYTE ESTERASE UR QL STRIP.AUTO: ABNORMAL
LYMPHOCYTES # BLD: 1.1 K/UL (ref 0.8–3.5)
LYMPHOCYTES NFR BLD: 26 % (ref 12–49)
MCH RBC QN AUTO: 31.6 PG (ref 26–34)
MCHC RBC AUTO-ENTMCNC: 33.1 G/DL (ref 30–36.5)
MCV RBC AUTO: 95.3 FL (ref 80–99)
MONOCYTES # BLD: 0.5 K/UL (ref 0–1)
MONOCYTES NFR BLD: 12 % (ref 5–13)
NEUTS SEG # BLD: 2.2 K/UL (ref 1.8–8)
NEUTS SEG NFR BLD: 52 % (ref 32–75)
NITRITE UR QL STRIP.AUTO: NEGATIVE
NRBC # BLD: 0 K/UL (ref 0–0.01)
NRBC BLD-RTO: 0 PER 100 WBC
PH UR STRIP: 6 [PH] (ref 5–8)
PLATELET # BLD AUTO: 206 K/UL (ref 150–400)
PMV BLD AUTO: 10.3 FL (ref 8.9–12.9)
POTASSIUM SERPL-SCNC: 4 MMOL/L (ref 3.5–5.1)
PROT SERPL-MCNC: 7 G/DL (ref 6.4–8.2)
PROT UR STRIP-MCNC: NEGATIVE MG/DL
RBC # BLD AUTO: 3.58 M/UL (ref 3.8–5.2)
RBC #/AREA URNS HPF: ABNORMAL /HPF (ref 0–5)
SODIUM SERPL-SCNC: 138 MMOL/L (ref 136–145)
SP GR UR REFRACTOMETRY: 1.02 (ref 1–1.03)
TROPONIN I SERPL-MCNC: <0.05 NG/ML
UA: UC IF INDICATED,UAUC: ABNORMAL
UROBILINOGEN UR QL STRIP.AUTO: 0.2 EU/DL (ref 0.2–1)
WBC # BLD AUTO: 4.1 K/UL (ref 3.6–11)
WBC URNS QL MICRO: ABNORMAL /HPF (ref 0–4)

## 2019-11-17 PROCEDURE — 74011000258 HC RX REV CODE- 258: Performed by: EMERGENCY MEDICINE

## 2019-11-17 PROCEDURE — 80053 COMPREHEN METABOLIC PANEL: CPT

## 2019-11-17 PROCEDURE — 87086 URINE CULTURE/COLONY COUNT: CPT

## 2019-11-17 PROCEDURE — 96365 THER/PROPH/DIAG IV INF INIT: CPT

## 2019-11-17 PROCEDURE — 74011250636 HC RX REV CODE- 250/636: Performed by: EMERGENCY MEDICINE

## 2019-11-17 PROCEDURE — 96361 HYDRATE IV INFUSION ADD-ON: CPT

## 2019-11-17 PROCEDURE — 99284 EMERGENCY DEPT VISIT MOD MDM: CPT

## 2019-11-17 PROCEDURE — 87186 SC STD MICRODIL/AGAR DIL: CPT

## 2019-11-17 PROCEDURE — 93005 ELECTROCARDIOGRAM TRACING: CPT

## 2019-11-17 PROCEDURE — 84484 ASSAY OF TROPONIN QUANT: CPT

## 2019-11-17 PROCEDURE — 85025 COMPLETE CBC W/AUTO DIFF WBC: CPT

## 2019-11-17 PROCEDURE — 81001 URINALYSIS AUTO W/SCOPE: CPT

## 2019-11-17 PROCEDURE — 87077 CULTURE AEROBIC IDENTIFY: CPT

## 2019-11-17 PROCEDURE — 36415 COLL VENOUS BLD VENIPUNCTURE: CPT

## 2019-11-17 RX ADMIN — SODIUM CHLORIDE 1000 ML: 900 INJECTION, SOLUTION INTRAVENOUS at 17:06

## 2019-11-17 RX ADMIN — CEFTRIAXONE 1 G: 1 INJECTION, POWDER, FOR SOLUTION INTRAMUSCULAR; INTRAVENOUS at 20:28

## 2019-11-17 NOTE — ED PROVIDER NOTES
EMERGENCY DEPARTMENT HISTORY AND PHYSICAL EXAM 
 
 
Please note that this dictation was completed with [employer], the computer voice recognition software. Quite often unanticipated grammatical, syntax, homophones, and other interpretive errors are inadvertently transcribed by the computer software. Please disregard these errors and any errors that have escaped final proofreading. Thank you. Date: 11/17/2019 Patient Name: Ivette Mcdonough Patient Age and Sex: 78 y.o. female History of Presenting Illness Chief Complaint Patient presents with  Syncope  
  near syncopal episode today while at lunch she reports that she has not been drinking alot of fluid she was diagnosed with a UTI last night at [facility] she has not started her antibioitcs for that. she reports that she had no LOC but did have a small amout of vomiting right before EMS arrived History Provided By: Patient and EMS 
 
HPI: Ivette Mcdonough, 78 y.o. female with past medical history as documented below presents to the ED with c/o of near syncope episode today while at lunch today. Pt reports feeling lightheaded and dizzy while at lunch. Pt reports feeling dehydrated and has not kept up with her fluid intake. Patient states that she was diagnosed with a urinary tract infection last night at a [place] [facility]. She says that she was prescribed Keflex but has not filled the prescription yet. She does note having one episode of nonbloody, nonbilious emesis prior to arrival.  She denies any headaches, loss of consciousness, chest pain, shortness of breath. Pt denies any other alleviating or exacerbating factors. Additionally, pt specifically denies any recent fever, chills, headache, abdominal pain, CP, SOB, numbness, weakness, BLE swelling, heart palpitations, urinary sxs, diarrhea, constipation, melena, hematochezia, cough, or congestion. There are no other complaints, changes or physical findings at this time. PCP: Effie Omalley MD 
 
Past History Past Medical History: 
Past Medical History:  
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   
 high cholesterol  Other ill-defined conditions(799.89) NO BP/VENIPUNCTURES RIGHT ARM (POST LYMPH NODE DISSECTION)  Other ill-defined conditions(799.89) MITRAL VALVE PROLAPSE  Stroke ([facility] [address] TIA 2009  (TAKING ASA) Past Surgical History: 
Past Surgical History:  
Procedure Laterality Date  HX BACK SURGERY  1998  
 l4-l5  ([facility]) Harpreet Valderrama  
 right  HX BREAST RECONSTRUCTION Right 8/27/2014 Revision Right Reconstructed Breast performed by Chris Rider MD at [address] HX BREAST RECONSTRUCTION Bilateral 2/20/2015 REVISION RIGHT RECONSTRUCTED BREAST/REMOVE TISSUE EXPANDERS/PLACE IMPLANT RIGHT/MASTOPEXY LEFT performed by Chris Rider MD at [address] HX [address] HX GI  2006 COLONOSCOPY  
 HX LITHOTRIPSY  1991  
 HX MASTECTOMY Right 2014  HX ORTHOPAEDIC  2005  
 left bunionectomy  HX TUBAL LIGATION    
 HX UROLOGICAL    
 kidney stone Family History: 
Family History Problem Relation Age of Onset  Heart Disease Mother  Heart Disease Brother  Cancer Brother PROSTATE  Cancer Father LUNG--ASBESTOS EXPOSURE  
 Heart Disease Maternal Aunt  Heart Disease Brother  Anesth Problems Neg Hx Social History: 
Social History Tobacco Use  Smoking status: Never Smoker  Smokeless tobacco: Never Used Substance Use Topics  Alcohol use: No  
 Drug use: No  
 
 
Allergies: Allergies Allergen Reactions  Losartan Anaphylaxis  Other Medication Anaphylaxis Allergy shot  Augmentin [Amoxicillin-Pot Clavulanate] Rash  Contrast Agent [Iodine] Hives Face and neck red after adminstration  Tetracycline Rash Current Medications: No current facility-administered medications on file prior to encounter. Current Outpatient Medications on File Prior to Encounter Medication Sig Dispense Refill  gabapentin (NEURONTIN) 100 mg capsule Take 1 Cap by mouth three (3) times daily. 90 Cap 0  
 guaiFENesin 200 mg/5 mL liqd Take 5 mL by mouth every four (4) hours as needed for Cough. 118 mL 0  
 esomeprazole (NEXIUM) 40 mg capsule Take  by mouth daily (with dinner).  MULTIVITAMIN PO Take  by mouth daily.  B.infantis-B.ani-B.long-B.bifi (PROBIOTIC 4X) 10-15 mg TbEC Take  by mouth daily.  amoxicillin 500 mg tab Take  by mouth. 500 MG; TAKE 4 TABS PRIOR TO DENTAL WORK    
 hydrochlorothiazide (HYDRODIURIL) 25 mg tablet Take 25 mg by mouth daily.  metoprolol (LOPRESSOR) 50 mg tablet Take 1 Tab by mouth two (2) times a day. (Patient taking differently: Take 25 mg by mouth two (2) times a day.) 20 Tab 0  
 aspirin 81 mg tablet Take 81 mg by mouth daily.  calcium carbonate 1,000 mg Tab Take 1 Tab by mouth daily.  OMEGA-3/DHA/EPA/FISH OIL (FISH OIL HIGH POTENCY PO) Take  by mouth two (2) times a day. Review of Systems Review of Systems Constitutional: Negative. Negative for chills and fever. HENT: Negative. Negative for congestion, facial swelling, rhinorrhea, sore throat, trouble swallowing and voice change. Eyes: Negative. Respiratory: Negative. Negative for apnea, cough, chest tightness, shortness of breath and wheezing. Cardiovascular: Negative. Negative for chest pain, palpitations and leg swelling. Gastrointestinal: Positive for vomiting. Negative for abdominal distention, abdominal pain, blood in stool, constipation, diarrhea and nausea. Endocrine: Negative.   Negative for cold intolerance, heat intolerance and polyuria. Genitourinary: Negative. Negative for difficulty urinating, dysuria, flank pain, frequency, hematuria and urgency. Musculoskeletal: Negative. Negative for arthralgias, back pain, myalgias, neck pain and neck stiffness. Skin: Negative. Negative for color change and rash. Neurological: Positive for dizziness, weakness and light-headedness. Negative for syncope, facial asymmetry, speech difficulty, numbness and headaches. Hematological: Negative. Does not bruise/bleed easily. Psychiatric/Behavioral: Negative. Negative for confusion and self-injury. The patient is not nervous/anxious. Physical Exam  
Physical Exam  
Constitutional: She is oriented to person, place, and time. She appears well-developed and well-nourished. No distress. HENT:  
Head: Normocephalic and atraumatic. Mouth/Throat: Oropharynx is clear and moist. No oropharyngeal exudate. Eyes: Pupils are equal, round, and reactive to light. Conjunctivae and EOM are normal.  
Neck: Normal range of motion. Cardiovascular: Normal rate, regular rhythm and normal heart sounds. Exam reveals no gallop and no friction rub. No murmur heard. Pulmonary/Chest: Effort normal and breath sounds normal. No respiratory distress. She has no wheezes. She has no rales. She exhibits no tenderness. Abdominal: Soft. Bowel sounds are normal. She exhibits no distension and no mass. There is no tenderness. There is no rebound and no guarding. Musculoskeletal: Normal range of motion. General: No tenderness, deformity or edema. Neurological: She is alert and oriented to person, place, and time. She displays normal reflexes. No cranial nerve deficit. She exhibits normal muscle tone. Coordination normal.  
Skin: Skin is warm. No rash noted. She is not diaphoretic. Psychiatric: She has a normal mood and affect. Nursing note and vitals reviewed. Diagnostic Study Results Labs - 
 No results found for this or any previous visit (from the past 24 hour(s)). Radiologic Studies - No orders to display CT Results  (Last 48 hours) None CXR Results  (Last 48 hours) None Medical Decision Making I am the first provider for this patient. I reviewed the vital signs, available nursing notes, past medical history, past surgical history, family history and social history. Vital Signs-Reviewed the patient's vital signs. Visit Vitals /76 Pulse 92 Temp 98.1 °F (36.7 °C) Resp 18 Ht 5' 3\" (1.6 m) Wt 62.1 kg (137 lb) SpO2 95% BMI 24.27 kg/m² Pulse Oximetry Analysis - 96% on RA Cardiac Monitor:  
Rate: 92 bpm 
Rhythm: Normal Sinus Rhythm ED EKG interpretation: 
Rhythm: normal sinus rhythm; and regular . Rate (approx.): 80; Axis: left axis deviation; P wave: normal; QRS interval: normal ; ST/T wave: normal; Other findings: LBBB This EKG was interpreted by Gregory Celaya M.D. Records Reviewed: Nursing Notes, Old Medical Records, Previous electrocardiograms, Previous Radiology Studies and Previous Laboratory Studies Provider Notes (Medical Decision Making):  
Patient presenting with near syncope, generalized fatigue/weakness. Stable vitals and nontoxic appearing. DDx: infection, anemia, electrolyte anomoly (hypo or hyperkalemia, hypomagnesemia), hypothyroid, dehydration, depression, CA, ACS. Will obtain EKG, UA, labwork for any urgent/emergent pathology. Will reassess and monitor while in ED. ED Course:  
Initial assessment performed. The patients presenting problems have been discussed, and they are in agreement with the care plan formulated and outlined with them. I have encouraged them to ask questions as they arise throughout their visit. HYPERTENSION COUNSELING Education was provided to the patient today regarding their hypertension.  Patient is made aware of their elevated blood pressure and is instructed to follow up this week with their Primary Care for a recheck. Patient is counseled regarding consequences of chronic, uncontrolled hypertension including kidney disease, heart disease, stroke or even death. Patient states their understanding and agrees to follow up this week. Additionally, during their visit, I discussed sodium restriction, maintaining ideal body weight and regular exercise program as physiologic means to achieve blood pressure control. The patient will strive towards this. I reviewed our electronic medical record system for any past medical records that were available that may contribute to the patient's current condition, the nursing notes and vital signs from today's visit. Rose Hoffman MD 
 
ED Orders Placed : 
Orders Placed This Encounter  CULTURE, URINE  CBC WITH AUTOMATED DIFF  
 COMPREHENSIVE METABOLIC PANEL  
 TROPONIN I  
 URINALYSIS W/ REFLEX CULTURE  
 EKG, 12 LEAD, INITIAL  sodium chloride 0.9 % bolus infusion 1,000 mL  cefTRIAXone (ROCEPHIN) 1 g in 0.9% sodium chloride (MBP/ADV) 50 mL  
 
ED Medications Administered: 
Medications  
sodium chloride 0.9 % bolus infusion 1,000 mL (0 mL IntraVENous IV Completed 11/17/19 2053) cefTRIAXone (ROCEPHIN) 1 g in 0.9% sodium chloride (MBP/ADV) 50 mL (0 g IntraVENous IV Completed 11/17/19 2053) Progress Note: 
Patient has been reassessed and reports feeling better and symptoms have improved significantly after ED treatment. Patient feels comfortable going home with close follow-up. Jacob Ellington's final labs and imaging have been reviewed with her and available family and/or caregiver. They have been counseled regarding her diagnosis. She verbally conveys understanding and agreement of the signs, symptoms, diagnosis, treatment and prognosis and additionally agrees to follow up as recommended with Dr. Liz Quevedo MD and/or specialist in 24 - 48 hours.  She also agrees with the care-plan we created together and conveys that all of her questions have been answered. I have also put together some discharge instructions for her that include: 1) educational information regarding their diagnosis, 2) how to care for their diagnosis at home, as well a 3) list of reasons why they would want to return to the ED prior to their follow-up appointment should the patient's condition change or symptoms worsen. I have answered all questions to the patient's satisfaction. Strict return precautions given. She both understood and agreed with plan as discussed. Vital signs stable for discharge. Disposition: DISCHARGE The pt is ready for discharge. The pt's signs, symptoms, diagnosis, and discharge instructions have been discussed and pt has conveyed their understanding. The pt is to follow up as recommended or return to ER should their symptoms worsen. Plan has been discussed and pt is in agreement. PLAN: 
1. Return precautions as discussed. 2.  
Discharge Medication List as of 11/17/2019  8:37 PM  
  
No current facility-administered medications for this encounter. Current Outpatient Medications:  
  gabapentin (NEURONTIN) 100 mg capsule, Take 1 Cap by mouth three (3) times daily. , Disp: 90 Cap, Rfl: 0 
  guaiFENesin 200 mg/5 mL liqd, Take 5 mL by mouth every four (4) hours as needed for Cough. , Disp: 118 mL, Rfl: 0 
  esomeprazole (NEXIUM) 40 mg capsule, Take  by mouth daily (with dinner). , Disp: , Rfl:  
  MULTIVITAMIN PO, Take  by mouth daily. , Disp: , Rfl:  
  B.infantis-B.ani-B.long-B.bifi (PROBIOTIC 4X) 10-15 mg TbEC, Take  by mouth daily. , Disp: , Rfl:  
  amoxicillin 500 mg tab, Take  by mouth. 500 MG; TAKE 4 TABS PRIOR TO DENTAL WORK, Disp: , Rfl:  
  hydrochlorothiazide (HYDRODIURIL) 25 mg tablet, Take 25 mg by mouth daily.   , Disp: , Rfl:  
  metoprolol (LOPRESSOR) 50 mg tablet, Take 1 Tab by mouth two (2) times a day. (Patient taking differently: Take 25 mg by mouth two (2) times a day.), Disp: 20 Tab, Rfl: 0 
  aspirin 81 mg tablet, Take 81 mg by mouth daily. , Disp: , Rfl:  
  calcium carbonate 1,000 mg Tab, Take 1 Tab by mouth daily. , Disp: , Rfl:  
  OMEGA-3/DHA/EPA/FISH OIL (FISH OIL HIGH POTENCY PO), Take  by mouth two (2) times a day., Disp: , Rfl:  
 
3. Follow-up Information Follow up With Specialties Details Why Contact Info [facility], [address] [phone] [facility] EMERGENCY DEPT Emergency Medicine  As needed, If symptoms worsen [address] [facility] [address] [facility] 
[phone] Return to ED if worse Diagnosis Clinical Impression: 1. Urinary tract infection without hematuria, site unspecified 2. Near syncope 3. Dehydration 4. Acute vomiting 5. Hyperglycemia Attestation: 
I personally performed the services described in this documentation on this date 11/17/2019 for patient, [facility]. Cory Viramontes MD 
 
 
This note will not be viewable in [address]

## 2019-11-18 LAB
ATRIAL RATE: 80 BPM
CALCULATED P AXIS, ECG09: 28 DEGREES
CALCULATED R AXIS, ECG10: -42 DEGREES
CALCULATED T AXIS, ECG11: 117 DEGREES
DIAGNOSIS, 93000: NORMAL
P-R INTERVAL, ECG05: 180 MS
Q-T INTERVAL, ECG07: 416 MS
QRS DURATION, ECG06: 146 MS
QTC CALCULATION (BEZET), ECG08: 479 MS
VENTRICULAR RATE, ECG03: 80 BPM

## 2019-11-18 NOTE — ED NOTES
Discharge paper work given to patient by this RN patients questions and concerns answered. Patient ambulatory at time of discharge with no difficulty. Patient discharged

## 2019-11-18 NOTE — DISCHARGE INSTRUCTIONS
Thank you for allowing us to take care of you today! We hope we addressed all of your concerns and needs. We strive to provide excellent quality care in the Emergency Department. You will receive a survey after your visit to evaluate the care you were provided. Should you receive a survey from us, we invite you to share your experience and tell us what made it excellent. It was a pleasure serving you, we invite you to share your experience with us, in our pursuit for excellence, should you be selected to receive a survey. The exam and treatment you received in the Emergency Department were for an urgent problem and are not intended as complete care. It is important that you follow up with a doctor, nurse practitioner, or physician assistant for ongoing care. If your symptoms become worse or you do not improve as expected and you are unable to reach your usual health care provider, you should return to the Emergency Department. We are available 24 hours a day. Please take your discharge instructions with you when you go to your follow-up appointment. If you have any problem arranging a follow-up appointment, contact the Emergency Department immediately. If a prescription has been provided, please have it filled as soon as possible to prevent a delay in treatment. Read the entire medication instruction sheet provided to you by the pharmacy. If you have any questions or reservations about taking the medication due to side effects or interactions with other medications, please call your primary care physician or contact the ER to speak with the charge nurse. Make an appointment with your family doctor or the physician you were referred to for follow-up of this visit as instructed on your discharge paperwork, as this is mandatory follow-up. Return to the ER if you are unable to be seen or if you are unable to be seen in a timely manner.     If you have any problem arranging the follow-up visit, contact the Emergency Department immediately. I hope you feel better and thank you again for allow us to provide you with excellent care today at [facility]! Warmest regards,    Juan Burns MD  Emergency Medicine Physician  [facility]        _____________________________________________________________________________________________________________    Vitals:    11/17/19 1626 11/17/19 1800 11/17/19 1830 11/17/19 1900   BP: 159/84 164/75 158/69 167/77   BP 1 Location: Left arm      BP Patient Position: At rest      Pulse: 92      Resp: 18      Temp: 98.1 °F (36.7 °C)      SpO2: 96% 96% 95% 96%   Weight: 62.1 kg (137 lb)      Height: 5' 3\" (1.6 m)          Recent Results (from the past 12 hour(s))   EKG, 12 LEAD, INITIAL    Collection Time: 11/17/19  4:53 PM   Result Value Ref Range    Ventricular Rate 80 BPM    Atrial Rate 80 BPM    P-R Interval 180 ms    QRS Duration 146 ms    Q-T Interval 416 ms    QTC Calculation (Bezet) 479 ms    Calculated P Axis 28 degrees    Calculated R Axis -42 degrees    Calculated T Axis 117 degrees    Diagnosis       Sinus rhythm with fusion complexes  Left axis deviation  Left bundle branch block  When compared with ECG of 09-MAR-2019 03:10,  fusion complexes are now present     CBC WITH AUTOMATED DIFF    Collection Time: 11/17/19  5:01 PM   Result Value Ref Range    WBC 4.1 3.6 - 11.0 K/uL    RBC 3.58 (L) 3.80 - 5.20 M/uL    HGB 11.3 (L) 11.5 - 16.0 g/dL    HCT 34.1 (L) 35.0 - 47.0 %    MCV 95.3 80.0 - 99.0 FL    MCH 31.6 26.0 - 34.0 PG    MCHC 33.1 30.0 - 36.5 g/dL    RDW 12.9 11.5 - 14.5 %    PLATELET 460 741 - 829 K/uL    MPV 10.3 8.9 - 12.9 FL    NRBC 0.0 0  WBC    ABSOLUTE NRBC 0.00 0.00 - 0.01 K/uL    NEUTROPHILS 52 32 - 75 %    LYMPHOCYTES 26 12 - 49 %    MONOCYTES 12 5 - 13 %    EOSINOPHILS 9 (H) 0 - 7 %    BASOPHILS 1 0 - 1 %    IMMATURE GRANULOCYTES 0 0.0 - 0.5 %    ABS.  NEUTROPHILS 2.2 1.8 - 8.0 K/UL    ABS. LYMPHOCYTES 1.1 0.8 - 3.5 K/UL    ABS. MONOCYTES 0.5 0.0 - 1.0 K/UL    ABS. EOSINOPHILS 0.4 0.0 - 0.4 K/UL    ABS. BASOPHILS 0.0 0.0 - 0.1 K/UL    ABS. IMM. GRANS. 0.0 0.00 - 0.04 K/UL    DF AUTOMATED     METABOLIC PANEL, COMPREHENSIVE    Collection Time: 11/17/19  5:01 PM   Result Value Ref Range    Sodium 138 136 - 145 mmol/L    Potassium 4.0 3.5 - 5.1 mmol/L    Chloride 105 97 - 108 mmol/L    CO2 29 21 - 32 mmol/L    Anion gap 4 (L) 5 - 15 mmol/L    Glucose 163 (H) 65 - 100 mg/dL    BUN 19 6 - 20 MG/DL    Creatinine 1.00 0.55 - 1.02 MG/DL    BUN/Creatinine ratio 19 12 - 20      GFR est AA >60 >60 ml/min/1.73m2    GFR est non-AA 53 (L) >60 ml/min/1.73m2    Calcium 8.5 8.5 - 10.1 MG/DL    Bilirubin, total 0.3 0.2 - 1.0 MG/DL    ALT (SGPT) 27 12 - 78 U/L    AST (SGOT) 22 15 - 37 U/L    Alk.  phosphatase 68 45 - 117 U/L    Protein, total 7.0 6.4 - 8.2 g/dL    Albumin 3.3 (L) 3.5 - 5.0 g/dL    Globulin 3.7 2.0 - 4.0 g/dL    A-G Ratio 0.9 (L) 1.1 - 2.2     TROPONIN I    Collection Time: 11/17/19  5:01 PM   Result Value Ref Range    Troponin-I, Qt. <0.05 <0.05 ng/mL   URINALYSIS W/ REFLEX CULTURE    Collection Time: 11/17/19  6:46 PM   Result Value Ref Range    Color DARK YELLOW      Appearance CLEAR CLEAR      Specific gravity 1.019 1.003 - 1.030      pH (UA) 6.0 5.0 - 8.0      Protein NEGATIVE  NEG mg/dL    Glucose NEGATIVE  NEG mg/dL    Ketone NEGATIVE  NEG mg/dL    Bilirubin NEGATIVE  NEG      Blood NEGATIVE  NEG      Urobilinogen 0.2 0.2 - 1.0 EU/dL    Nitrites NEGATIVE  NEG      Leukocyte Esterase SMALL (A) NEG      WBC 5-10 0 - 4 /hpf    RBC 0-5 0 - 5 /hpf    Epithelial cells FEW FEW /lpf    Bacteria NEGATIVE  NEG /hpf    UA:UC IF INDICATED URINE CULTURE ORDERED (A) CNI         No orders to display     CT Results  (Last 48 hours)    None          Local Primary Care Physicians   [facility] [phone]  MD Power Hull MD Leonardo Stain, MD [facility] Doctors [phone]  Cynthia Malave, [facility]  MD Devan Mejia MD [place] Life, MD [address]  [phone]  MD Nadia Sierra MD [address] [facility] [phone]  Gurney Holter, MD Jenine Oppenheim, MD Sherin Lott MD   [facility] [phone]  [facility] LADY WARD, MD Zahra Winston, MD Yasmeen Fritz, NP [address] [phone]  Ruth Monreal, MD Estella Restrepo, MD Elidia Nettles, MD Jordan Garcia, MD Luisa Quiles, MD Deidra Manley MD   [address] [facility]  Isha Stallings MD [facility] [phone]  MD Carlos Byrd, DEEPIKA Osborne, MD Divya Bonilla MD Loy Pare, MD Aundria Marshall, MD   [address] [facility] [phone]  Juwan Herring, MD Jovany Montilla, P  Rosa Curry, DEEPIKA De La Cruz, MD Luke Thorpe Cha, MD Hunt Primus, MD [facility] [phone]  MD Jodie Padgett, MD Faustino Francis MD Elfredia December, MD   [address] [phone]  MD Brody Olsen MD [place] [phone]  Garrison Palin, MD Jannette Essex, MD Geraldo Bair, MD   [facility] [phone]  Alease Gilford, MD Izell Nightingale, MD Rosalind Ide, MD Letta Laurence, MD Davida Hals, MD Gerri Conte, DEEPIKA Childers MD [address]   [phone]  MD Karely Clark MD Gerardo Dy, MD     [address] [facility] [phone]  Eileen Xie, MD Thi Mata, GEETHA Bishop, RADHA Bishop, FNP Estelle Simmonds, MD Miguel Baig, DEEPIKA Moreno,    Miscellaneous:  Chantell Orlando MD [facility] Departments   For adult and child immunizations, family planning, TB screening, STD testing and women's health services. [facility] CENTER: [place] [phone]     [place] Medical [facility] 1822   [facility]   729 [facility]: Rossy Bagley [address] [facility] [address] [phone]     [address]        [address] [place] [address]  For primary care services, woman and child wellness, and some clinics providing specialty care. [facility] -- [address] [facility] [facility] [phone]   [address] [facility] 200 [facility] 3614 [facility] [phone]   [address] [facility] [address] [place] [address] [phone]   [address] [facility] [employer] 1604 [facility] 5850  Community  [phone]   7700 [facility] [address] [address] [phone]   [address] [phone]   [address] [phone]   [facility]: [address]     [id number]  Community  [phone]   [employer]  [address] ([email or web address])         Sexual Health/Woman Wellness Clinics   For STD/HIV testing and treatment, pregnancy testing and services, men's health, birth control services, LGBT services, and hepatitis/HPV vaccine services. [facility] [employer] [place] [employer] [address] [facility] [address] [facility] [address] [facility] 15[address] [phone]   [facility] [address], 5th floor [phone]   Pregnancy [address] [facility] [address]  [place] [phone]        [place] [address] [address] [facility] -- [address]   [phone] [place]   [phone]   Women, Infant and Children's Services: Evie Campos [phone]       [place] [address]   [phone]   Jerome Yelitza   [place]ita       Patient Education        Urinary Tract Infection in Women: Care Instructions  Your Care Instructions    A urinary tract infection, or UTI, is a general term for an infection anywhere between the kidneys and the urethra (where urine comes out). Most UTIs are bladder infections. They often cause pain or burning when you urinate. UTIs are caused by bacteria and can be cured with antibiotics. Be sure to complete your treatment so that the infection goes away. Follow-up care is a key part of your treatment and safety. Be sure to make and go to all appointments, and call your doctor if you are having problems. It's also a good idea to know your test results and keep a list of the medicines you take. How can you care for yourself at home? · Take your antibiotics as directed. Do not stop taking them just because you feel better. You need to take the full course of antibiotics. · Drink extra water and other fluids for the next day or two. This may help wash out the bacteria that are causing the infection. (If you have kidney, heart, or liver disease and have to limit fluids, talk with your doctor before you increase your fluid intake.)  · Avoid drinks that are carbonated or have caffeine. They can irritate the bladder. · Urinate often. Try to empty your bladder each time. · To relieve pain, take a hot bath or lay a heating pad set on low over your lower belly or genital area. Never go to sleep with a heating pad in place. To prevent UTIs  · Drink plenty of water each day. This helps you urinate often, which clears bacteria from your system.  (If you have kidney, heart, or liver disease and have to limit fluids, talk with your doctor before you increase your fluid intake.)  · Urinate when you need to. · Urinate right after you have sex. · Change sanitary pads often. · Avoid douches, bubble baths, feminine hygiene sprays, and other feminine hygiene products that have deodorants. · After going to the bathroom, wipe from front to back. When should you call for help? Call your doctor now or seek immediate medical care if:    · Symptoms such as fever, chills, nausea, or vomiting get worse or appear for the first time.     · You have new pain in your back just below your rib cage. This is called flank pain.     · There is new blood or pus in your urine.     · You have any problems with your antibiotic medicine.    Watch closely for changes in your health, and be sure to contact your doctor if:    · You are not getting better after taking an antibiotic for 2 days.     · Your symptoms go away but then come back. Where can you learn more? Go to http://rosanne-ocrrie.info/. Enter G917 in the search box to learn more about \"Urinary Tract Infection in Women: Care Instructions. \"  Current as of: December 19, 2018  Content Version: 12.2  © 9747-6174 [employer] Care instructions adapted under license by [employer] (which disclaims liability or warranty for this information). If you have questions about a medical condition or this instruction, always ask your healthcare professional. [place] [address] any warranty or liability for your use of this information. Patient Education        Fainting: Care Instructions  Your Care Instructions    When you faint, or pass out, you lose consciousness for a short time. A brief drop in blood flow to the brain often causes it. When you fall or lie down, more blood flows to your brain and you regain consciousness.   Emotional stress, pain, or overheating--especially if you have been standing--can make you faint. In these cases, fainting is usually not serious. But fainting can be a sign of a more serious problem. Your doctor may want you to have more tests to rule out other causes. The treatment you need depends on the reason why you fainted. The doctor has checked you carefully, but problems can develop later. If you notice any problems or new symptoms, get medical treatment right away. Follow-up care is a key part of your treatment and safety. Be sure to make and go to all appointments, and call your doctor if you are having problems. It's also a good idea to know your test results and keep a list of the medicines you take. How can you care for yourself at home? · Drink plenty of fluids to prevent dehydration. If you have kidney, heart, or liver disease and have to limit fluids, talk with your doctor before you increase your fluid intake. When should you call for help? Call 911 anytime you think you may need emergency care. For example, call if:    · You have symptoms of a heart problem. These may include:  ? Chest pain or pressure. ? Severe trouble breathing. ? A fast or irregular heartbeat. ? Lightheadedness or sudden weakness. ? Coughing up pink, foamy mucus. ? Passing out. After you call 911, the  may tell you to chew 1 adult-strength or 2 to 4 low-dose aspirin. Wait for an ambulance. Do not try to drive yourself.     · You have symptoms of a stroke. These may include:  ? Sudden numbness, tingling, weakness, or loss of movement in your face, arm, or leg, especially on only one side of your body. ? Sudden vision changes. ? Sudden trouble speaking. ? Sudden confusion or trouble understanding simple statements. ? Sudden problems with walking or balance.   ? A sudden, severe headache that is different from past headaches.     · You passed out (lost consciousness) again.    Watch closely for changes in your health, and be sure to contact your doctor if:    · You do not get better as expected. Where can you learn more? Go to http://rosanne-corrie.info/. Enter X434 in the search box to learn more about \"Fainting: Care Instructions. \"  Current as of: June 26, 2019  Content Version: 12.2  © 2590-5566 [employer]. Care instructions adapted under license by [employer] (which disclaims liability or warranty for this information). If you have questions about a medical condition or this instruction, always ask your healthcare professional. [address] any warranty or liability for your use of this information.

## 2019-11-19 LAB
BACTERIA SPEC CULT: ABNORMAL
CC UR VC: ABNORMAL
SERVICE CMNT-IMP: ABNORMAL

## 2020-01-01 ENCOUNTER — APPOINTMENT (OUTPATIENT)
Dept: GENERAL RADIOLOGY | Age: 80
End: 2020-01-01
Attending: EMERGENCY MEDICINE
Payer: MEDICARE

## 2020-01-01 ENCOUNTER — HOSPITAL ENCOUNTER (OUTPATIENT)
Dept: GENERAL RADIOLOGY | Age: 80
Discharge: HOME | End: 2020-10-27
Attending: EMERGENCY MEDICINE
Payer: MEDICARE

## 2020-01-01 ENCOUNTER — HOSPITAL ENCOUNTER (EMERGENCY)
Age: 80
Discharge: HOME | End: 2020-10-21
Attending: EMERGENCY MEDICINE
Payer: MEDICARE

## 2020-01-01 ENCOUNTER — PATIENT OUTREACH (OUTPATIENT)
Dept: CASE MANAGEMENT | Age: 80
End: 2020-01-01

## 2020-01-01 ENCOUNTER — ANESTHESIA EVENT (OUTPATIENT)
Dept: SURGERY | Age: 80
End: 2020-01-01
Payer: MEDICARE

## 2020-01-01 ENCOUNTER — OFFICE VISIT (OUTPATIENT)
Dept: SURGERY | Age: 80
End: 2020-01-01
Payer: MEDICARE

## 2020-01-01 ENCOUNTER — TELEPHONE (OUTPATIENT)
Dept: OTHER | Age: 80
End: 2020-01-01

## 2020-01-01 ENCOUNTER — ANESTHESIA (OUTPATIENT)
Dept: SURGERY | Age: 80
End: 2020-01-01
Payer: MEDICARE

## 2020-01-01 ENCOUNTER — HOSPITAL ENCOUNTER (OUTPATIENT)
Dept: PREADMISSION TESTING | Age: 80
Discharge: HOME | End: 2020-10-08
Payer: MEDICARE

## 2020-01-01 ENCOUNTER — TRANSCRIBE ORDER (OUTPATIENT)
Dept: REGISTRATION | Age: 80
End: 2020-01-01

## 2020-01-01 ENCOUNTER — HOSPITAL ENCOUNTER (OUTPATIENT)
Age: 80
Setting detail: OUTPATIENT SURGERY
Discharge: HOME | End: 2020-10-12
Attending: SURGERY | Admitting: SURGERY
Payer: MEDICARE

## 2020-01-01 ENCOUNTER — HOSPITAL ENCOUNTER (EMERGENCY)
Age: 80
Discharge: HOME | End: 2020-10-23
Attending: EMERGENCY MEDICINE | Admitting: EMERGENCY MEDICINE
Payer: MEDICARE

## 2020-01-01 VITALS
DIASTOLIC BLOOD PRESSURE: 89 MMHG | HEIGHT: 63 IN | HEART RATE: 63 BPM | TEMPERATURE: 98.4 F | BODY MASS INDEX: 24.1 KG/M2 | SYSTOLIC BLOOD PRESSURE: 181 MMHG | WEIGHT: 136 LBS | OXYGEN SATURATION: 94 % | RESPIRATION RATE: 17 BRPM

## 2020-01-01 VITALS
HEART RATE: 84 BPM | WEIGHT: 135 LBS | DIASTOLIC BLOOD PRESSURE: 76 MMHG | SYSTOLIC BLOOD PRESSURE: 158 MMHG | HEIGHT: 63 IN | OXYGEN SATURATION: 93 % | RESPIRATION RATE: 15 BRPM | BODY MASS INDEX: 23.92 KG/M2 | TEMPERATURE: 98.6 F

## 2020-01-01 VITALS
RESPIRATION RATE: 16 BRPM | HEIGHT: 63 IN | BODY MASS INDEX: 24.1 KG/M2 | SYSTOLIC BLOOD PRESSURE: 189 MMHG | HEART RATE: 66 BPM | TEMPERATURE: 97.7 F | OXYGEN SATURATION: 98 % | DIASTOLIC BLOOD PRESSURE: 77 MMHG | WEIGHT: 136.02 LBS

## 2020-01-01 VITALS
WEIGHT: 133 LBS | OXYGEN SATURATION: 96 % | HEART RATE: 84 BPM | HEIGHT: 63 IN | DIASTOLIC BLOOD PRESSURE: 81 MMHG | TEMPERATURE: 98.2 F | RESPIRATION RATE: 18 BRPM | BODY MASS INDEX: 23.57 KG/M2 | SYSTOLIC BLOOD PRESSURE: 156 MMHG

## 2020-01-01 VITALS
OXYGEN SATURATION: 97 % | BODY MASS INDEX: 24.17 KG/M2 | WEIGHT: 136.4 LBS | HEART RATE: 73 BPM | DIASTOLIC BLOOD PRESSURE: 84 MMHG | HEIGHT: 63 IN | TEMPERATURE: 98.5 F | SYSTOLIC BLOOD PRESSURE: 140 MMHG | RESPIRATION RATE: 16 BRPM

## 2020-01-01 DIAGNOSIS — Z91.89 SEDENTARY LIFESTYLE: ICD-10-CM

## 2020-01-01 DIAGNOSIS — Z71.89 COUNSELING REGARDING ADVANCED DIRECTIVES: Primary | ICD-10-CM

## 2020-01-01 DIAGNOSIS — S29.012A STRAIN OF THORACIC BACK REGION: ICD-10-CM

## 2020-01-01 DIAGNOSIS — R07.89 ATYPICAL CHEST PAIN: ICD-10-CM

## 2020-01-01 DIAGNOSIS — M25.474 BILATERAL SWELLING OF FEET AND ANKLES: Primary | ICD-10-CM

## 2020-01-01 DIAGNOSIS — Z01.812 PRE-PROCEDURE LAB EXAM: Primary | ICD-10-CM

## 2020-01-01 DIAGNOSIS — C43.9 RECURRENT MALIGNANT MELANOMA OF SKIN (HCC): ICD-10-CM

## 2020-01-01 DIAGNOSIS — G89.18 POST-OP PAIN: Primary | ICD-10-CM

## 2020-01-01 DIAGNOSIS — Z51.89 ENCOUNTER FOR WOUND CARE: ICD-10-CM

## 2020-01-01 DIAGNOSIS — R69 DIAGNOSIS UNKNOWN: ICD-10-CM

## 2020-01-01 DIAGNOSIS — M25.472 BILATERAL SWELLING OF FEET AND ANKLES: Primary | ICD-10-CM

## 2020-01-01 DIAGNOSIS — C43.9 RECURRENT MALIGNANT MELANOMA OF SKIN (HCC): Primary | ICD-10-CM

## 2020-01-01 DIAGNOSIS — M25.471 BILATERAL SWELLING OF FEET AND ANKLES: Primary | ICD-10-CM

## 2020-01-01 DIAGNOSIS — R07.9 ACUTE CHEST PAIN: Primary | ICD-10-CM

## 2020-01-01 DIAGNOSIS — I10 ESSENTIAL HYPERTENSION: ICD-10-CM

## 2020-01-01 DIAGNOSIS — M25.475 BILATERAL SWELLING OF FEET AND ANKLES: Primary | ICD-10-CM

## 2020-01-01 DIAGNOSIS — Z01.812 PRE-PROCEDURE LAB EXAM: ICD-10-CM

## 2020-01-01 DIAGNOSIS — Z20.822 SUSPECTED COVID-19 VIRUS INFECTION: ICD-10-CM

## 2020-01-01 DIAGNOSIS — I10 ACCELERATED HYPERTENSION: ICD-10-CM

## 2020-01-01 LAB
ALBUMIN SERPL-MCNC: 3.3 G/DL (ref 3.5–5)
ALBUMIN SERPL-MCNC: 3.3 G/DL (ref 3.5–5)
ALBUMIN/GLOB SERPL: 0.8 {RATIO} (ref 1.1–2.2)
ALBUMIN/GLOB SERPL: 0.9 {RATIO} (ref 1.1–2.2)
ALP SERPL-CCNC: 61 U/L (ref 45–117)
ALP SERPL-CCNC: 61 U/L (ref 45–117)
ALT SERPL-CCNC: 23 U/L (ref 12–78)
ALT SERPL-CCNC: 24 U/L (ref 12–78)
ANION GAP SERPL CALC-SCNC: 4 MMOL/L (ref 5–15)
ANION GAP SERPL CALC-SCNC: 7 MMOL/L (ref 5–15)
AST SERPL-CCNC: 22 U/L (ref 15–37)
AST SERPL-CCNC: 22 U/L (ref 15–37)
ATRIAL RATE: 76 BPM
BASOPHILS # BLD: 0 K/UL (ref 0–0.1)
BASOPHILS # BLD: 0 K/UL (ref 0–0.1)
BASOPHILS NFR BLD: 0 % (ref 0–1)
BASOPHILS NFR BLD: 0 % (ref 0–1)
BILIRUB SERPL-MCNC: 0.4 MG/DL (ref 0.2–1)
BILIRUB SERPL-MCNC: 0.4 MG/DL (ref 0.2–1)
BNP SERPL-MCNC: 761 PG/ML
BUN SERPL-MCNC: 16 MG/DL (ref 6–20)
BUN SERPL-MCNC: 18 MG/DL (ref 6–20)
BUN/CREAT SERPL: 19 (ref 12–20)
BUN/CREAT SERPL: 22 (ref 12–20)
CALCIUM SERPL-MCNC: 9.6 MG/DL (ref 8.5–10.1)
CALCIUM SERPL-MCNC: 9.6 MG/DL (ref 8.5–10.1)
CALCULATED P AXIS, ECG09: 67 DEGREES
CALCULATED R AXIS, ECG10: -43 DEGREES
CALCULATED T AXIS, ECG11: 97 DEGREES
CHLORIDE SERPL-SCNC: 104 MMOL/L (ref 97–108)
CHLORIDE SERPL-SCNC: 104 MMOL/L (ref 97–108)
CK SERPL-CCNC: 68 U/L (ref 26–192)
CO2 SERPL-SCNC: 28 MMOL/L (ref 21–32)
CO2 SERPL-SCNC: 32 MMOL/L (ref 21–32)
COMMENT, HOLDF: NORMAL
COMMENT, HOLDF: NORMAL
COVID-19, XGCOVT: NOT DETECTED
CREAT SERPL-MCNC: 0.83 MG/DL (ref 0.55–1.02)
CREAT SERPL-MCNC: 0.84 MG/DL (ref 0.55–1.02)
DIAGNOSIS, 93000: NORMAL
DIFFERENTIAL METHOD BLD: ABNORMAL
DIFFERENTIAL METHOD BLD: ABNORMAL
EOSINOPHIL # BLD: 0.3 K/UL (ref 0–0.4)
EOSINOPHIL # BLD: 0.3 K/UL (ref 0–0.4)
EOSINOPHIL NFR BLD: 5 % (ref 0–7)
EOSINOPHIL NFR BLD: 6 % (ref 0–7)
ERYTHROCYTE [DISTWIDTH] IN BLOOD BY AUTOMATED COUNT: 12.5 % (ref 11.5–14.5)
ERYTHROCYTE [DISTWIDTH] IN BLOOD BY AUTOMATED COUNT: 12.5 % (ref 11.5–14.5)
GLOBULIN SER CALC-MCNC: 3.8 G/DL (ref 2–4)
GLOBULIN SER CALC-MCNC: 3.9 G/DL (ref 2–4)
GLUCOSE SERPL-MCNC: 121 MG/DL (ref 65–100)
GLUCOSE SERPL-MCNC: 98 MG/DL (ref 65–100)
HCT VFR BLD AUTO: 33.1 % (ref 35–47)
HCT VFR BLD AUTO: 33.7 % (ref 35–47)
HEALTH STATUS, XMCV2T: NORMAL
HGB BLD-MCNC: 11.3 G/DL (ref 11.5–16)
HGB BLD-MCNC: 11.4 G/DL (ref 11.5–16)
IMM GRANULOCYTES # BLD AUTO: 0 K/UL (ref 0–0.04)
IMM GRANULOCYTES # BLD AUTO: 0 K/UL (ref 0–0.04)
IMM GRANULOCYTES NFR BLD AUTO: 0 % (ref 0–0.5)
IMM GRANULOCYTES NFR BLD AUTO: 0 % (ref 0–0.5)
LYMPHOCYTES # BLD: 1.1 K/UL (ref 0.8–3.5)
LYMPHOCYTES # BLD: 1.4 K/UL (ref 0.8–3.5)
LYMPHOCYTES NFR BLD: 20 % (ref 12–49)
LYMPHOCYTES NFR BLD: 26 % (ref 12–49)
MAGNESIUM SERPL-MCNC: 1.9 MG/DL (ref 1.6–2.4)
MCH RBC QN AUTO: 31.1 PG (ref 26–34)
MCH RBC QN AUTO: 31.4 PG (ref 26–34)
MCHC RBC AUTO-ENTMCNC: 33.8 G/DL (ref 30–36.5)
MCHC RBC AUTO-ENTMCNC: 34.1 G/DL (ref 30–36.5)
MCV RBC AUTO: 91.9 FL (ref 80–99)
MCV RBC AUTO: 92.1 FL (ref 80–99)
MONOCYTES # BLD: 0.5 K/UL (ref 0–1)
MONOCYTES # BLD: 0.7 K/UL (ref 0–1)
MONOCYTES NFR BLD: 10 % (ref 5–13)
MONOCYTES NFR BLD: 13 % (ref 5–13)
NEUTS SEG # BLD: 3.1 K/UL (ref 1.8–8)
NEUTS SEG # BLD: 3.1 K/UL (ref 1.8–8)
NEUTS SEG NFR BLD: 59 % (ref 32–75)
NEUTS SEG NFR BLD: 61 % (ref 32–75)
NRBC # BLD: 0 K/UL (ref 0–0.01)
NRBC # BLD: 0 K/UL (ref 0–0.01)
NRBC BLD-RTO: 0 PER 100 WBC
NRBC BLD-RTO: 0 PER 100 WBC
P-R INTERVAL, ECG05: 184 MS
PLATELET # BLD AUTO: 206 K/UL (ref 150–400)
PLATELET # BLD AUTO: 207 K/UL (ref 150–400)
PMV BLD AUTO: 10.6 FL (ref 8.9–12.9)
PMV BLD AUTO: 11 FL (ref 8.9–12.9)
POTASSIUM SERPL-SCNC: 3.6 MMOL/L (ref 3.5–5.1)
POTASSIUM SERPL-SCNC: 3.9 MMOL/L (ref 3.5–5.1)
PROT SERPL-MCNC: 7.1 G/DL (ref 6.4–8.2)
PROT SERPL-MCNC: 7.2 G/DL (ref 6.4–8.2)
Q-T INTERVAL, ECG07: 430 MS
QRS DURATION, ECG06: 146 MS
QTC CALCULATION (BEZET), ECG08: 483 MS
RBC # BLD AUTO: 3.6 M/UL (ref 3.8–5.2)
RBC # BLD AUTO: 3.66 M/UL (ref 3.8–5.2)
SAMPLES BEING HELD,HOLD: NORMAL
SAMPLES BEING HELD,HOLD: NORMAL
SARS-COV-2, COV2NT: NOT DETECTED
SODIUM SERPL-SCNC: 139 MMOL/L (ref 136–145)
SODIUM SERPL-SCNC: 140 MMOL/L (ref 136–145)
SOURCE, COVRS: NORMAL
SPECIMEN SOURCE, FCOV2M: NORMAL
SPECIMEN TYPE, XMCV1T: NORMAL
TROPONIN I SERPL-MCNC: <0.05 NG/ML
VENTRICULAR RATE, ECG03: 76 BPM
WBC # BLD AUTO: 5.2 K/UL (ref 3.6–11)
WBC # BLD AUTO: 5.3 K/UL (ref 3.6–11)

## 2020-01-01 PROCEDURE — 84484 ASSAY OF TROPONIN QUANT: CPT

## 2020-01-01 PROCEDURE — 71046 X-RAY EXAM CHEST 2 VIEWS: CPT

## 2020-01-01 PROCEDURE — 36415 COLL VENOUS BLD VENIPUNCTURE: CPT

## 2020-01-01 PROCEDURE — G8399 PT W/DXA RESULTS DOCUMENT: HCPCS | Performed by: SURGERY

## 2020-01-01 PROCEDURE — 87635 SARS-COV-2 COVID-19 AMP PRB: CPT

## 2020-01-01 PROCEDURE — 99285 EMERGENCY DEPT VISIT HI MDM: CPT

## 2020-01-01 PROCEDURE — 99202 OFFICE O/P NEW SF 15 MIN: CPT | Performed by: SURGERY

## 2020-01-01 PROCEDURE — 82550 ASSAY OF CK (CPK): CPT

## 2020-01-01 PROCEDURE — 1090F PRES/ABSN URINE INCON ASSESS: CPT | Performed by: SURGERY

## 2020-01-01 PROCEDURE — 88305 TISSUE EXAM BY PATHOLOGIST: CPT

## 2020-01-01 PROCEDURE — 80053 COMPREHEN METABOLIC PANEL: CPT

## 2020-01-01 PROCEDURE — 77030002996 HC SUT SLK J&J -A: Performed by: SURGERY

## 2020-01-01 PROCEDURE — G8432 DEP SCR NOT DOC, RNG: HCPCS | Performed by: SURGERY

## 2020-01-01 PROCEDURE — 85025 COMPLETE CBC W/AUTO DIFF WBC: CPT

## 2020-01-01 PROCEDURE — 2709999900 HC NON-CHARGEABLE SUPPLY: Performed by: SURGERY

## 2020-01-01 PROCEDURE — 83735 ASSAY OF MAGNESIUM: CPT

## 2020-01-01 PROCEDURE — 76210000021 HC REC RM PH II 0.5 TO 1 HR: Performed by: SURGERY

## 2020-01-01 PROCEDURE — 96374 THER/PROPH/DIAG INJ IV PUSH: CPT

## 2020-01-01 PROCEDURE — 93005 ELECTROCARDIOGRAM TRACING: CPT

## 2020-01-01 PROCEDURE — 74011000250 HC RX REV CODE- 250: Performed by: SURGERY

## 2020-01-01 PROCEDURE — 76010000138 HC OR TIME 0.5 TO 1 HR: Performed by: SURGERY

## 2020-01-01 PROCEDURE — 71045 X-RAY EXAM CHEST 1 VIEW: CPT

## 2020-01-01 PROCEDURE — G8536 NO DOC ELDER MAL SCRN: HCPCS | Performed by: SURGERY

## 2020-01-01 PROCEDURE — 77030002916 HC SUT ETHLN J&J -A: Performed by: SURGERY

## 2020-01-01 PROCEDURE — 99024 POSTOP FOLLOW-UP VISIT: CPT | Performed by: NURSE PRACTITIONER

## 2020-01-01 PROCEDURE — G8427 DOCREV CUR MEDS BY ELIG CLIN: HCPCS | Performed by: SURGERY

## 2020-01-01 PROCEDURE — 77030002933 HC SUT MCRYL J&J -A: Performed by: SURGERY

## 2020-01-01 PROCEDURE — 74011250636 HC RX REV CODE- 250/636: Performed by: EMERGENCY MEDICINE

## 2020-01-01 PROCEDURE — 74011250637 HC RX REV CODE- 250/637: Performed by: EMERGENCY MEDICINE

## 2020-01-01 PROCEDURE — 83880 ASSAY OF NATRIURETIC PEPTIDE: CPT

## 2020-01-01 PROCEDURE — 77030040922 HC BLNKT HYPOTHRM STRY -A

## 2020-01-01 PROCEDURE — G8420 CALC BMI NORM PARAMETERS: HCPCS | Performed by: SURGERY

## 2020-01-01 PROCEDURE — 74011000250 HC RX REV CODE- 250: Performed by: EMERGENCY MEDICINE

## 2020-01-01 PROCEDURE — 1101F PT FALLS ASSESS-DOCD LE1/YR: CPT | Performed by: SURGERY

## 2020-01-01 PROCEDURE — 72072 X-RAY EXAM THORAC SPINE 3VWS: CPT

## 2020-01-01 PROCEDURE — 77030031139 HC SUT VCRL2 J&J -A: Performed by: SURGERY

## 2020-01-01 RX ORDER — ALBUTEROL SULFATE 90 UG/1
2 AEROSOL, METERED RESPIRATORY (INHALATION)
Qty: 1 INHALER | Refills: 0 | Status: SHIPPED | OUTPATIENT
Start: 2020-01-01 | End: 2021-01-01

## 2020-01-01 RX ORDER — PRAVASTATIN SODIUM 40 MG/1
40 TABLET ORAL
COMMUNITY
End: 2021-01-01

## 2020-01-01 RX ORDER — NAPROXEN 500 MG/1
500 TABLET ORAL
Qty: 20 TAB | Refills: 0 | Status: SHIPPED | OUTPATIENT
Start: 2020-01-01 | End: 2021-01-01

## 2020-01-01 RX ORDER — HYDROCHLOROTHIAZIDE 25 MG/1
12.5 TABLET ORAL ONCE
Status: COMPLETED | OUTPATIENT
Start: 2020-01-01 | End: 2020-01-01

## 2020-01-01 RX ORDER — BUPIVACAINE HYDROCHLORIDE AND EPINEPHRINE 5; 5 MG/ML; UG/ML
INJECTION, SOLUTION EPIDURAL; INTRACAUDAL; PERINEURAL AS NEEDED
Status: DISCONTINUED | OUTPATIENT
Start: 2020-01-01 | End: 2020-01-01 | Stop reason: HOSPADM

## 2020-01-01 RX ORDER — ATORVASTATIN CALCIUM 20 MG/1
20 TABLET, FILM COATED ORAL
COMMUNITY
End: 2021-01-01

## 2020-01-01 RX ORDER — LIDOCAINE 4 G/100G
1 PATCH TOPICAL EVERY 12 HOURS
Qty: 10 PATCH | Refills: 0 | Status: SHIPPED | OUTPATIENT
Start: 2020-01-01 | End: 2021-01-01

## 2020-01-01 RX ORDER — BUPIVACAINE HYDROCHLORIDE AND EPINEPHRINE 5; 5 MG/ML; UG/ML
30 INJECTION, SOLUTION EPIDURAL; INTRACAUDAL; PERINEURAL ONCE
Status: CANCELLED | OUTPATIENT
Start: 2020-01-01 | End: 2020-01-01

## 2020-01-01 RX ORDER — ZINC GLUCONATE 10 MG
LOZENGE ORAL
COMMUNITY
End: 2021-01-01

## 2020-01-01 RX ORDER — LIDOCAINE 4 G/100G
1 PATCH TOPICAL EVERY 24 HOURS
Status: DISCONTINUED | OUTPATIENT
Start: 2020-01-01 | End: 2020-01-01 | Stop reason: HOSPADM

## 2020-01-01 RX ORDER — UREA 10 %
220 LOTION (ML) TOPICAL 2 TIMES DAILY
Qty: 10 TAB | Refills: 0 | Status: SHIPPED | OUTPATIENT
Start: 2020-01-01 | End: 2020-01-01

## 2020-01-01 RX ORDER — HYDROCHLOROTHIAZIDE 25 MG/1
25 TABLET ORAL
Status: COMPLETED | OUTPATIENT
Start: 2020-01-01 | End: 2020-01-01

## 2020-01-01 RX ORDER — HYDROCODONE BITARTRATE AND ACETAMINOPHEN 5; 325 MG/1; MG/1
1 TABLET ORAL
Qty: 9 TAB | Refills: 0 | Status: SHIPPED | OUTPATIENT
Start: 2020-01-01 | End: 2020-01-01

## 2020-01-01 RX ORDER — DOXAZOSIN 2 MG/1
1 TABLET ORAL
Status: COMPLETED | OUTPATIENT
Start: 2020-01-01 | End: 2020-01-01

## 2020-01-01 RX ORDER — POTASSIUM CHLORIDE 750 MG/1
10 CAPSULE, EXTENDED RELEASE ORAL 2 TIMES DAILY
COMMUNITY
End: 2021-01-01

## 2020-01-01 RX ORDER — TRAMADOL HYDROCHLORIDE 50 MG/1
50 TABLET ORAL
Qty: 18 TAB | Refills: 0 | Status: SHIPPED | OUTPATIENT
Start: 2020-01-01 | End: 2020-01-01

## 2020-01-01 RX ORDER — METHOCARBAMOL 500 MG/1
500 TABLET, FILM COATED ORAL 4 TIMES DAILY
Qty: 20 TAB | Refills: 0 | Status: SHIPPED | OUTPATIENT
Start: 2020-01-01 | End: 2021-01-01

## 2020-01-01 RX ORDER — KETOROLAC TROMETHAMINE 30 MG/ML
15 INJECTION, SOLUTION INTRAMUSCULAR; INTRAVENOUS
Status: COMPLETED | OUTPATIENT
Start: 2020-01-01 | End: 2020-01-01

## 2020-01-01 RX ORDER — METHOCARBAMOL 500 MG/1
500 TABLET, FILM COATED ORAL
Status: COMPLETED | OUTPATIENT
Start: 2020-01-01 | End: 2020-01-01

## 2020-01-01 RX ORDER — ASCORBIC ACID 500 MG
500 TABLET ORAL 2 TIMES DAILY
Qty: 10 TAB | Refills: 0 | Status: SHIPPED | OUTPATIENT
Start: 2020-01-01 | End: 2020-01-01

## 2020-01-01 RX ORDER — EZETIMIBE 10 MG/1
10 TABLET ORAL DAILY
COMMUNITY
End: 2021-01-01

## 2020-01-01 RX ORDER — METOPROLOL TARTRATE 50 MG/1
50 TABLET ORAL ONCE
Status: COMPLETED | OUTPATIENT
Start: 2020-01-01 | End: 2020-01-01

## 2020-01-01 RX ORDER — DOXAZOSIN 1 MG/1
TABLET ORAL DAILY
COMMUNITY
End: 2021-01-01

## 2020-01-01 RX ADMIN — HYDROCHLOROTHIAZIDE 25 MG: 25 TABLET ORAL at 07:02

## 2020-01-01 RX ADMIN — DOXAZOSIN 1 MG: 2 TABLET ORAL at 08:27

## 2020-01-01 RX ADMIN — HYDROCHLOROTHIAZIDE 12.5 MG: 25 TABLET ORAL at 09:21

## 2020-01-01 RX ADMIN — METOPROLOL TARTRATE 25 MG: 50 TABLET, FILM COATED ORAL at 10:09

## 2020-01-01 RX ADMIN — METHOCARBAMOL TABLETS 500 MG: 500 TABLET, COATED ORAL at 08:26

## 2020-01-01 RX ADMIN — KETOROLAC TROMETHAMINE 15 MG: 30 INJECTION, SOLUTION INTRAMUSCULAR at 08:28

## 2020-07-14 ENCOUNTER — HOSPITAL ENCOUNTER (OUTPATIENT)
Dept: PET IMAGING | Age: 80
Discharge: HOME | End: 2020-07-14
Attending: INTERNAL MEDICINE
Payer: MEDICARE

## 2020-07-14 VITALS — BODY MASS INDEX: 24.27 KG/M2 | WEIGHT: 137 LBS | HEIGHT: 63 IN

## 2020-07-14 DIAGNOSIS — C50.411 MALIGNANT NEOPLASM OF UPPER-OUTER QUADRANT OF RIGHT FEMALE BREAST (HCC): ICD-10-CM

## 2020-07-14 DIAGNOSIS — D05.91 UNSPECIFIED TYPE OF CARCINOMA IN SITU OF RIGHT BREAST: ICD-10-CM

## 2020-07-14 DIAGNOSIS — C43.59: ICD-10-CM

## 2020-07-14 PROCEDURE — A9552 F18 FDG: HCPCS

## 2020-07-14 RX ORDER — SODIUM CHLORIDE 0.9 % (FLUSH) 0.9 %
10 SYRINGE (ML) INJECTION
Status: COMPLETED | OUTPATIENT
Start: 2020-07-14 | End: 2020-07-14

## 2020-07-14 RX ADMIN — Medication 10 ML: at 13:45

## 2020-09-23 PROBLEM — C43.9: Status: ACTIVE | Noted: 2020-01-01

## 2020-09-23 NOTE — PROGRESS NOTES
Subjective:      Aleksandr Flores  is a 78 y.o. female who presents for evaluation of recurrent RIGHT upper back melanoma. Pt had RIGHT upper back shave biopsy on 05/14/19 with Dr. Rachael Alvares with PATH demonstrating malignant melanoma, 1.98 mm thick, ulceration present, pT2b. She underwent RIGHT upper back melanoma excision and SLNBx with Dr. Vicie Siemens on 06/13/19. Final surgical PATH: no residual melanocytic proliferation, 0/1 RIGHT axillary LNs involved     Pt had shave biopsy of same area in the RIGHT upper back on 06/17/20 with PATH showing malignant melanoma in the superficial dermis, consistent with local recurrence/metastasis. Pt had PET/CT scan on 07/14/20 that did not show any foci of abnormal hypermetabolism. Pt also has history of RIGHT breast cancer. 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 This was treated with mastectomy and ALND. Pt continues on AI and and is followed by Dr. Franco Roque for medical oncology care.      Past Medical History:   Diagnosis Date    Arthritis     SPINE    Calculus of kidney 1990    Cancer ([address]     right breast ; BCCA    Environmental allergies     GERD (gastroesophageal reflux disease)     Hypercholesterolemia     Hypertension     Nausea & vomiting     Other ill-defined conditions(799.89)     high cholesterol    Other ill-defined conditions(799.89)     NO BP/VENIPUNCTURES RIGHT ARM (POST LYMPH NODE DISSECTION)    Other ill-defined conditions(799.89)     MITRAL VALVE PROLAPSE    Recurrent malignant melanoma of skin ([address] 9/23/2020    Stroke ([address]     TIA 2009  (TAKING ASA)       Past Surgical History:   Procedure Laterality Date    HX BACK SURGERY  1998    l4-l5  ([facility]--[facility])    HX BREAST LUMPECTOMY  1999    right    HX BREAST RECONSTRUCTION Right 8/27/2014    Revision Right Reconstructed Breast performed by Charan Barrios MD at [address] HX BREAST RECONSTRUCTION Bilateral 2/20/2015    REVISION RIGHT RECONSTRUCTED BREAST/REMOVE TISSUE EXPANDERS/PLACE IMPLANT RIGHT/MASTOPEXY LEFT performed by Charan Barrios MD at [address] AND CURETTAGE      HX GI  2006    COLONOSCOPY    HX LITHOTRIPSY  1991    HX MASTECTOMY Right 2014    HX ORTHOPAEDIC  2005    left bunionectomy    HX TUBAL LIGATION      HX UROLOGICAL      kidney stone       Social History     Tobacco Use    Smoking status: Never Smoker    Smokeless tobacco: Never Used   Substance Use Topics    Alcohol use: No       Family History   Problem Relation Age of Onset    Heart Disease Mother     Heart Disease Brother     Cancer Brother         PROSTATE    Cancer Father         LUNG--ASBESTOS EXPOSURE    Heart Disease Maternal Aunt     Heart Disease Brother     Anesth Problems Neg Hx        Current Outpatient Medications on File Prior to Visit   Medication Sig Dispense Refill    doxazosin (CARDURA) 1 mg tablet Take  by mouth daily.  MULTIVITAMIN PO Take  by mouth daily.  B.infantis-B.ani-B.long-B.bifi (PROBIOTIC 4X) 10-15 mg TbEC Take  by mouth daily.  hydrochlorothiazide (HYDRODIURIL) 25 mg tablet Take 25 mg by mouth daily.  metoprolol (LOPRESSOR) 50 mg tablet Take 1 Tab by mouth two (2) times a day. (Patient taking differently: Take 25 mg by mouth two (2) times a day.) 20 Tab 0    aspirin 81 mg tablet Take 81 mg by mouth daily.  calcium carbonate 1,000 mg Tab Take 1 Tab by mouth daily.  gabapentin (NEURONTIN) 100 mg capsule Take 1 Cap by mouth three (3) times daily. 90 Cap 0    guaiFENesin 200 mg/5 mL liqd Take 5 mL by mouth every four (4) hours as needed for Cough. 118 mL 0    esomeprazole (NEXIUM) 40 mg capsule Take  by mouth daily (with dinner).  amoxicillin 500 mg tab Take  by mouth.  500 MG; TAKE 4 TABS PRIOR TO DENTAL WORK      OMEGA-3/DHA/EPA/FISH OIL (FISH OIL HIGH POTENCY PO) Take  by mouth two (2) times a day. No current facility-administered medications on file prior to visit. Allergies   Allergen Reactions    Losartan Anaphylaxis    Other Medication Anaphylaxis     Allergy shot     Augmentin [Amoxicillin-Pot Clavulanate] Rash    Contrast Agent [Iodine] Hives     Face and neck red after adminstration    Tetracycline Rash     Review of Systems:    A comprehensive review of systems was negative except for that written in the History of Present Illness. Objective:     Visit Vitals  BP (!) 140/84 (BP 1 Location: Left arm, BP Patient Position: Sitting)   Pulse 73   Temp 98.5 °F (36.9 °C) (Oral)   Resp 16   Ht 5' 3\" (1.6 m)   Wt 136 lb 6.4 oz (61.9 kg)   SpO2 97%   BMI 24.16 kg/m²        Physical Exam:  LUNG: clear to auscultation bilaterally  HEART: regular rate and rhythm, S1, S2 normal, no murmur, click, rub or gallop  SKIN: RIGHT back has 12 cm well healed incision with 1 cm area of ulceration overlying the RIGHT scapula. No axillary nor supraclavicular adenopathy on either side. Labs: No results found for this or any previous visit (from the past 24 hour(s)). Data Review:      PET/CT 07/14/20: IMPRESSION: No Foci of Abnormal Hypermetabolism. Assessment and Plan:       ICD-10-CM ICD-9-CM    1. Recurrent malignant melanoma of skin (HCC)  C43.9 172.9        Will plan for wide excision of RIGHT upper back metastatic melanoma under local anesthesia only. Reviewed the details of this procedure and what pt should expect for recovery. This will be an outpatient procure and pt will need  to take her home following the procedure. All questions were answered and pt is in agreement with this plan. The patient was counseled at length about the risks of dianna Covid-19 during their perioperative period and any recovery window from their procedure.   The patient was made aware that dianna Covid-19  may worsen their prognosis for recovering from their procedure and lend to a higher morbidity and/or mortality risk. All material risks, benefits, and reasonable alternatives including postponing the procedure were discussed. The patient does  wish to proceed with the procedure at this time. This document was scribed by Buck Singer as dictated by Dr. Moriah Montelongo.      Signed By: Emmett [facility] MD     09/23/20

## 2020-09-23 NOTE — LETTER
9/23/20 Patient: Kenneth Vincent YOB: 1940 Date of Visit: 9/23/2020 Rodrigo Stuart MD 
[address] [facility] 
[address] [address] VIA Facsimile: [phone] Dear Rodrigo Stuart MD, Thank you for referring Ms. Shayan Cole to [facility] [address] [facility] for evaluation. My notes for this consultation are attached. If you have questions, please do not hesitate to call me. I look forward to following your patient along with you. Sincerely, Therese Obrien MD

## 2020-09-23 NOTE — PROGRESS NOTES
1. Have you been to the ER, urgent care clinic since your last visit? Hospitalized since your last visit? No    2. Have you seen or consulted any other health care providers outside of the [address] [place] [address] since your last visit? Include any pap smears or colon screening.   No

## 2020-10-12 NOTE — BRIEF OP NOTE
Brief Postoperative Note    Patient: Freda Robles  YOB: 1940  MRN: [id number]    Date of Procedure: 10/12/2020     Pre-Op Diagnosis: RECURRENT MELANOMA OF BACK    Post-Op Diagnosis: Same as preoperative diagnosis.       Procedure(s):  WIDE EXCISION OF RECURRENT MELANOMA OF BACK    Surgeon(s):  Wade De La Rosa MD    Surgical Assistant: None    Anesthesia: Local     Estimated Blood Loss (mL): Minimal    Complications: None    Specimens:   ID Type Source Tests Collected by Time Destination   1 : RECURRENT MELANOMA Fresh Back  Wade De La Rosa MD 10/12/2020 1204 Pathology        Implants: * No implants in log *    Drains: * No LDAs found *    Findings: 6 mm recurrence removed    Electronically Signed by Elpidio Yoder MD on 10/12/2020 at 12:11 PM  [id number]

## 2020-10-12 NOTE — PERIOP NOTES
Patient: Chetna Madden MRN: [id number]  SSN: [id number]   YOB: 1940  Age: 78 y.o. Sex: female     Patient is status post Procedure(s):  WIDE EXCISION OF RECURRENT MELANOMA OF BACK. Surgeon(s) and Role:     * Silvia Vidal MD - Primary    Local/Dose/Irrigation:  SEE MAR                                       Dressing/Packing:  Wound Back Right-Dressing Type: Sutures; Other (Comment);4 x 4;Special tape (comment)(BACITRACIN OINTMENT) (10/12/20 2644)

## 2020-10-12 NOTE — H&P
Subjective:      Edgardo Fulton  is a 78 y.o. female who presents for evaluation of recurrent RIGHT upper back melanoma. Pt had RIGHT upper back shave biopsy on 05/14/19 with Dr. Shirlene Musa with [facility] demonstrating malignant melanoma, 1.98 mm thick, ulceration present, pT2b. She underwent RIGHT upper back melanoma excision and SLNBx with Dr. Anibal Jones on 06/13/19. Final surgical [facility]: no residual melanocytic proliferation, 0/1 RIGHT axillary LNs involved      Pt had shave biopsy of same area in the RIGHT upper back on 06/17/20 with [facility] showing malignant melanoma in the superficial dermis, consistent with local recurrence/metastasis. Pt had PET/CT scan on 07/14/20 that did not show any foci of abnormal hypermetabolism. Pt also has history of RIGHT breast cancer. 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 This was treated with mastectomy and ALND. Pt continues on AI and and is followed by Dr. Jamia Linn for medical oncology care.            Past Medical History:   Diagnosis Date    Arthritis       SPINE    Calculus of kidney 1990    Cancer ([address]       right breast ; BCCA    Environmental allergies      GERD (gastroesophageal reflux disease)      Hypercholesterolemia      Hypertension      Nausea & vomiting      Other ill-defined conditions(799.89)       high cholesterol    Other ill-defined conditions(799.89)       NO BP/VENIPUNCTURES RIGHT ARM (POST LYMPH NODE DISSECTION)    Other ill-defined conditions(799.89)       MITRAL VALVE PROLAPSE    Recurrent malignant melanoma of skin ([address] 9/23/2020    Stroke ([address]       TIA 2009  (TAKING ASA)              Past Surgical History:   Procedure Laterality Date    HX BACK SURGERY   1998     l4-l5  ([facility])    HX BREAST LUMPECTOMY   1999     right    HX BREAST RECONSTRUCTION Right 8/27/2014     Revision Right Reconstructed Breast performed by Summer Arcos MD at [address] Bilateral 2/20/2015     REVISION RIGHT RECONSTRUCTED BREAST/REMOVE TISSUE EXPANDERS/PLACE IMPLANT RIGHT/MASTOPEXY LEFT performed by Summer Arcos MD at [address] HX DILATION AND CURETTAGE        HX GI   2006     COLONOSCOPY    HX LITHOTRIPSY   1991    HX MASTECTOMY Right 2014    HX ORTHOPAEDIC   2005     left bunionectomy    HX TUBAL LIGATION        HX UROLOGICAL         kidney stone        Social History           Tobacco Use    Smoking status: Never Smoker    Smokeless tobacco: Never Used   Substance Use Topics    Alcohol use: No              Family History   Problem Relation Age of Onset    Heart Disease Mother      Heart Disease Brother      Cancer Brother           PROSTATE    Cancer Father           LUNG--ASBESTOS EXPOSURE    Heart Disease Maternal Aunt      Heart Disease Brother      Anesth Problems Neg Hx                 Current Outpatient Medications on File Prior to Visit   Medication Sig Dispense Refill    doxazosin (CARDURA) 1 mg tablet Take  by mouth daily.  MULTIVITAMIN PO Take  by mouth daily.  B.infantis-B.ani-B.long-B.bifi (PROBIOTIC 4X) 10-15 mg TbEC Take  by mouth daily.  hydrochlorothiazide (HYDRODIURIL) 25 mg tablet Take 25 mg by mouth daily.  metoprolol (LOPRESSOR) 50 mg tablet Take 1 Tab by mouth two (2) times a day. (Patient taking differently: Take 25 mg by mouth two (2) times a day.) 20 Tab 0    aspirin 81 mg tablet Take 81 mg by mouth daily.  calcium carbonate 1,000 mg Tab Take 1 Tab by mouth daily.  gabapentin (NEURONTIN) 100 mg capsule Take 1 Cap by mouth three (3) times daily. 90 Cap 0    guaiFENesin 200 mg/5 mL liqd Take 5 mL by mouth every four (4) hours as needed for Cough. 118 mL 0    esomeprazole (NEXIUM) 40 mg capsule Take  by mouth daily (with dinner).  amoxicillin 500 mg tab Take  by mouth. 500 MG; TAKE 4 TABS PRIOR TO DENTAL WORK        OMEGA-3/DHA/EPA/FISH OIL (FISH OIL HIGH POTENCY PO) Take  by mouth two (2) times a day. No current facility-administered medications on file prior to visit. Allergies   Allergen Reactions    Losartan Anaphylaxis    Other Medication Anaphylaxis       Allergy shot     Augmentin [Amoxicillin-Pot Clavulanate] Rash    Contrast Agent [Iodine] Hives       Face and neck red after adminstration    Tetracycline Rash     Review of Systems:    A comprehensive review of systems was negative except for that written in the History of Present Illness. Objective:      Visit Vitals  BP (!) 140/84 (BP 1 Location: Left arm, BP Patient Position: Sitting)   Pulse 73   Temp 98.5 °F (36.9 °C) (Oral)   Resp 16   Ht 5' 3\" (1.6 m)   Wt 136 lb 6.4 oz (61.9 kg)   SpO2 97%   BMI 24.16 kg/m²         Physical Exam:  LUNG: clear to auscultation bilaterally  HEART: regular rate and rhythm, S1, S2 normal, no murmur, click, rub or gallop  SKIN: RIGHT back has 12 cm well healed incision with 1 cm area of ulceration overlying the RIGHT scapula. No axillary nor supraclavicular adenopathy on either side. Labs:    Recent Results   No results found for this or any previous visit (from the past 24 hour(s)). Data Review:       PET/CT 07/14/20: IMPRESSION: No Foci of Abnormal Hypermetabolism. Assessment and Plan:         ICD-10-CM ICD-9-CM     1. Recurrent malignant melanoma of skin (HCC)  C43.9 172.9          Will plan for wide excision of RIGHT upper back metastatic melanoma under local anesthesia only. Reviewed the details of this procedure and what pt should expect for recovery. This will be an outpatient procure and pt will need  to take her home following the procedure. All questions were answered and pt is in agreement with this plan.      The patient was counseled at length about the risks of dianna Covid-19 during their perioperative period and any recovery window from their procedure. The patient was made aware that dianna Covid-19  may worsen their prognosis for recovering from their procedure and lend to a higher morbidity and/or mortality risk. All material risks, benefits, and reasonable alternatives including postponing the procedure were discussed. The patient does  wish to proceed with the procedure at this time.                                                     ·

## 2020-10-12 NOTE — OP NOTES
[address]  
OPERATIVE REPORT Name:  [address] 
MR#:  [id number] :  1940 ACCOUNT #:  [de-identified] DATE OF SERVICE:  10/12/2020 PREOPERATIVE DIAGNOSIS:  Recurrent melanoma of the back. POSTOPERATIVE DIAGNOSIS:  Recurrent melanoma of the back. PROCEDURE PERFORMED:  Wide excision of recurrent melanoma of the back. SURGEON:  Vikki Dc MD 
 
ASSISTANT:  None. ANESTHESIA:  Local with 0.5% Marcaine with epinephrine. COMPLICATIONS:  None. SPECIMENS REMOVED:  Ellipse of skin that measured 3 x 2 cm in size. IMPLANTS:  None. ESTIMATED BLOOD LOSS:  Minimal. 
 
DRAINS:  None. FINDINGS:  6 mm recurrence. PROCEDURE:  With the patient prone, the back was prepared with ChloraPrep and draped as a field. 0.5% Marcaine with epinephrine was infiltrated around the area. A 3 x 2 cm ellipse was made encompassing the entire lesion and the surrounding tissues. The depth of the incision was incised using the cautery. Hemostasis was excellent. The incision was closed with a running 3-0 nylon suture. Bacitracin and a dry dressing were applied. At the termination of the procedure, all counts were correct. The patient tolerated this well and was brought to the PACU in satisfactory condition. Lorena Smith MD 
 
 
[id number] 
D:  10/12/2020 12:15 
T:  10/12/2020 14:46 JOB #:  [id number] CC:   Author Aleksandra MD

## 2020-10-12 NOTE — DISCHARGE INSTRUCTIONS
Patient Discharge Instructions    Liya Pain / [id number] : 1940    Admitted 10/12/2020 Discharged: 10/12/2020     Take Home Medications            · It is important that you take the medication exactly as they are prescribed. · Keep your medication in the bottles provided by the pharmacist and keep a list of the medication names, dosages, and times to be taken in your wallet. · Do not take other medications without consulting your doctor. What to do at Home    Recommended diet: Regular Diet,     Recommended activity: Activity as tolerated, may shower whenever you wish; just blot the incision and stitches dry. Follow-up Appointments   Procedures    FOLLOW UP VISIT Appointment in: Two Weeks     Standing Status:   Standing     Number of Occurrences:   1     Order Specific Question:   Appointment in     Answer: Two Weeks           Information obtained by :  I understand that if any problems occur once I am at home I am to contact my physician. I understand and acknowledge receipt of the instructions indicated above.                                                                                                                                            Physician's or R.N.'s Signature                                                                  Date/Time                                                                                                                                              Patient or Representative Signature                                                          Date/Time

## 2020-10-12 NOTE — PERIOP NOTES
1225p: PT back from local procedure. Report rec'd from Sagrario Faye, OR RN. Pt coughing harshly, asking for water. Dry, hacky cough. Water given with relief. Dr Danielle Holder in to see. States has sent in RX for pain medication and pt has follow up apptment already scheduled for Oct 26. Pt denies any pain or nausea. 1306: pt tolerating clear liquids and has voided, denies any pain. Up at bedside changing into clothes. Discharge instructions given as per Dr Danielle Holder. Copy in chart. Pt discharged per w/c to  to car.

## 2020-10-12 NOTE — ANESTHESIA PREPROCEDURE EVALUATION
Anesthetic History     PONV          Review of Systems / Medical History  Patient summary reviewed, nursing notes reviewed and pertinent labs reviewed    Pulmonary  Within defined limits                 Neuro/Psych       CVA  TIA     Cardiovascular    Hypertension: well controlled              Exercise tolerance: >4 METS  Comments: LBBB longstanding; pt states card eval 11/2019 OK.    GI/Hepatic/Renal     GERD: well controlled           Endo/Other        Arthritis     Other Findings            Physical Exam    Airway  Mallampati: II  TM Distance: > 6 cm  Neck ROM: normal range of motion   Mouth opening: Normal     Cardiovascular  Regular rate and rhythm,  S1 and S2 normal,  no murmur, click, rub, or gallop             Dental  No notable dental hx       Pulmonary  Breath sounds clear to auscultation               Abdominal  GI exam deferred       Other Findings            Anesthetic Plan    ASA: 3  Anesthesia type: general and MAC          Induction: Intravenous  Anesthetic plan and risks discussed with: Patient

## 2020-10-21 NOTE — ED NOTES
Pt presents ambulatory to ed and states that she woke up with AM with increased swelling to bilat lower extremities. Pt also notes high BP with systolic in the 068'F. Pt taking bp med and pta only medicated with metoprolol. Pt also reports lower back pain and notes she has been dx with arthritis of her spine. She is unsure if it is osteoarthritis. Melanoma removed upper back oct 12th that is healing, appropriate, no inflammation noted. Hx of CA of breast with mastectomy and lymph ectomy. Pt also notes difficulty in controlling urine that has been going on for about year. Pt denies cp, sob, abdominal pain, n,v,d, cough, numbness/tingling, blurry vision, ha.  
 
 
7:28 AM: Bedside shift change report given to Quintin Vargas (oncoming nurse) by Mayi Cox RN (offgoing nurse). Report included the following information SBAR, Kardex, ED Summary, [facility] and Recent Results.

## 2020-10-21 NOTE — ED PROVIDER NOTES
EMERGENCY DEPARTMENT HISTORY AND PHYSICAL EXAM 
 
 
Date: 10/21/2020 Patient Name: Sandra Gandhi Patient Age and Sex: 78 y.o. female History of Presenting Illness Chief Complaint Patient presents with  Hypertension  
  pt reports that she woke up this morning with elevated BP of 213/96  Leg Swelling  
  pt also reports swelling to BL LE  
 Back Pain  
  back surgery on 10/12 History Provided By: Patient HPI: Sandra Gandhi is an 57-year-old female with a history of hypertension, hyperlipidemia, presenting with high blood pressure, bilateral ankle swelling, and back pain. Patient states that she woke up this morning with high blood pressures in the 200s. Only took her metoprolol 25 mg this morning but did not take her doxazosin or hydrochlorothiazide. Denies any chest pain, headache, nausea, vomiting, abdominal pain associated with it. Patient also states that she deals with some mild ankle swelling every day but when she went to bed yesterday had no issues and then woke up this morning with significant bilateral ankle swelling. States that she has been watching her salt intake. Denies any history of congestive heart failure, renal or liver failure. Denies any history of significant arthritis or gout in the ankles. Patient also states that she is having some right sided mid back pain after her back surgery on October 12. Had a melanoma removed from her right upper back and since then has been having pain in her mid back worse on movement. Denies any shortness of breath, cough, dysuria or hematuria, saddle anesthesia, numbness or weakness of one side versus another. There are no other complaints, changes, or physical findings at this time. PCP: Sharmila Sandy MD 
 
No current facility-administered medications on file prior to encounter. Current Outpatient Medications on File Prior to Encounter Medication Sig Dispense Refill  ezetimibe (Zetia) 10 mg tablet Take 10 mg by mouth daily.  atorvastatin (LIPITOR) 20 mg tablet Take 20 mg by mouth nightly.  doxazosin (CARDURA) 1 mg tablet Take  by mouth daily.  hydrochlorothiazide (HYDRODIURIL) 25 mg tablet Take 12.5 mg by mouth daily.  metoprolol (LOPRESSOR) 50 mg tablet Take 1 Tab by mouth two (2) times a day. (Patient taking differently: Take 25 mg by mouth two (2) times a day.) 20 Tab 0  
 aspirin 81 mg tablet Take 81 mg by mouth daily.  OTHER PREVAGEN DAILY  MULTIVITAMIN PO Take  by mouth daily.  B.infantis-B.ani-B.long-B.bifi (PROBIOTIC 4X) 10-15 mg TbEC Take  by mouth daily.  amoxicillin 500 mg tab Take  by mouth. 500 MG; TAKE 4 TABS PRIOR TO DENTAL WORK  calcium carbonate 1,000 mg Tab Take 1 Tab by mouth daily. Past History Past Medical History: 
Past Medical History:  
Diagnosis Date  Arrhythmia LBBB  Arthritis SPINE  Calculus of kidney 1990  Cancer ([address] right breast X2 ; BCCA  Environmental allergies  GERD (gastroesophageal reflux disease)  Hypercholesterolemia  Hypertension  Nausea & vomiting  Other ill-defined conditions(799.89)   
 high cholesterol  Other ill-defined conditions(799.89) NO BP/VENIPUNCTURES RIGHT ARM (POST LYMPH NODE DISSECTION)  Other ill-defined conditions(799.89) MITRAL VALVE PROLAPSE  Recurrent malignant melanoma of skin ([address] 9/23/2020  Stroke ([address] TIA 2009  (TAKING ASA) Past Surgical History: 
Past Surgical History:  
Procedure Laterality Date  HX BACK SURGERY  1998  
 l4-l5  ([facility]) Harpreet Valderrama  
 right  HX BREAST RECONSTRUCTION Right 8/27/2014 Revision Right Reconstructed Breast performed by Nenita Hopson MD at [address] HX BREAST RECONSTRUCTION Bilateral 2/20/2015  REVISION RIGHT RECONSTRUCTED BREAST/REMOVE TISSUE EXPANDERS/PLACE IMPLANT RIGHT/MASTOPEXY LEFT performed by Otoniel Cisneros MD at [address] HX CATARACT REMOVAL    
 BILATERAL  
 HX DILATION AND CURETTAGE    
 HX GI  2006 COLONOSCOPY  
 HX LITHOTRIPSY  1991  
 HX MASTECTOMY Right 2014  HX ORTHOPAEDIC  2005  
 left bunionectomy  HX MINAL AND BSO  HX TUBAL LIGATION    
 HX UROLOGICAL    
 kidney stone  HX UROLOGICAL  2016 BLADDER SLING Family History: 
Family History Problem Relation Age of Onset  Heart Disease Mother  Heart Disease Brother  Cancer Brother PROSTATE  Cancer Father LUNG--ASBESTOS EXPOSURE  
 Heart Disease Maternal Aunt  Heart Disease Brother  Anesth Problems Neg Hx Social History: 
Social History Tobacco Use  Smoking status: Never Smoker  Smokeless tobacco: Never Used Substance Use Topics  Alcohol use: No  
 Drug use: No  
 
 
Allergies: Allergies Allergen Reactions  Losartan Anaphylaxis  Other Medication Anaphylaxis Allergy shot  Augmentin [Amoxicillin-Pot Clavulanate] Rash  Contrast Agent [Iodine] Hives Face and neck red after adminstration  Tetracycline Rash Review of Systems Review of Systems Constitutional: Negative for chills and fever. Respiratory: Negative for cough and shortness of breath. Cardiovascular: Negative for chest pain. Gastrointestinal: Negative for abdominal pain, constipation, diarrhea, nausea and vomiting. Genitourinary: Negative for dysuria, frequency and hematuria. Musculoskeletal: Positive for back pain and joint swelling. Neurological: Negative for weakness and numbness. All other systems reviewed and are negative. Physical Exam  
Physical Exam 
Vitals signs and nursing note reviewed. Constitutional:   
   Appearance: She is well-developed. HENT:  
   Head: Normocephalic and atraumatic. Nose: Nose normal.  
   Mouth/Throat:  
   Mouth: Mucous membranes are moist.  
Eyes: Extraocular Movements: Extraocular movements intact. Conjunctiva/sclera: Conjunctivae normal.  
Neck: Musculoskeletal: Normal range of motion and neck supple. Cardiovascular:  
   Rate and Rhythm: Normal rate and regular rhythm. Pulmonary:  
   Effort: Pulmonary effort is normal. No respiratory distress. Breath sounds: Normal breath sounds. Abdominal:  
   General: There is no distension. Palpations: Abdomen is soft. Tenderness: There is no abdominal tenderness. Musculoskeletal: Normal range of motion. Comments: No pitting edema just swelling around bilateral ankles. She is able to range the ankles. Over patient's back has a dressing over her right upper back which when removed shows a surgical site that is healing well with sutures. No signs of infection or bruising. About 2 inches below that over the right paraspinal muscles of the thoracic area, patient does have tenderness and pain elicited with movement. Skin: 
   General: Skin is warm and dry. Neurological:  
   General: No focal deficit present. Mental Status: She is alert and oriented to person, place, and time. Mental status is at baseline. Psychiatric:     
   Mood and Affect: Mood normal.  
 
  
 
Diagnostic Study Results Labs - Recent Results (from the past 12 hour(s)) CBC WITH AUTOMATED DIFF Collection Time: 10/21/20  6:56 AM  
Result Value Ref Range WBC 5.2 3.6 - 11.0 K/uL  
 RBC 3.66 (L) 3.80 - 5.20 M/uL  
 HGB 11.4 (L) 11.5 - 16.0 g/dL HCT 33.7 (L) 35.0 - 47.0 % MCV 92.1 80.0 - 99.0 FL  
 MCH 31.1 26.0 - 34.0 PG  
 MCHC 33.8 30.0 - 36.5 g/dL  
 RDW 12.5 11.5 - 14.5 % PLATELET 583 228 - 974 K/uL MPV 10.6 8.9 - 12.9 FL  
 NRBC 0.0 0  WBC ABSOLUTE NRBC 0.00 0.00 - 0.01 K/uL NEUTROPHILS 61 32 - 75 % LYMPHOCYTES 20 12 - 49 % MONOCYTES 13 5 - 13 % EOSINOPHILS 6 0 - 7 % BASOPHILS 0 0 - 1 % IMMATURE GRANULOCYTES 0 0.0 - 0.5 % ABS. NEUTROPHILS 3.1 1.8 - 8.0 K/UL  
 ABS. LYMPHOCYTES 1.1 0.8 - 3.5 K/UL  
 ABS. MONOCYTES 0.7 0.0 - 1.0 K/UL  
 ABS. EOSINOPHILS 0.3 0.0 - 0.4 K/UL  
 ABS. BASOPHILS 0.0 0.0 - 0.1 K/UL  
 ABS. IMM. GRANS. 0.0 0.00 - 0.04 K/UL  
 DF AUTOMATED METABOLIC PANEL, COMPREHENSIVE Collection Time: 10/21/20  6:56 AM  
Result Value Ref Range Sodium 140 136 - 145 mmol/L Potassium 3.9 3.5 - 5.1 mmol/L Chloride 104 97 - 108 mmol/L  
 CO2 32 21 - 32 mmol/L Anion gap 4 (L) 5 - 15 mmol/L Glucose 98 65 - 100 mg/dL BUN 18 6 - 20 MG/DL Creatinine 0.83 0.55 - 1.02 MG/DL  
 BUN/Creatinine ratio 22 (H) 12 - 20 GFR est AA >60 >60 ml/min/1.73m2 GFR est non-AA >60 >60 ml/min/1.73m2 Calcium 9.6 8.5 - 10.1 MG/DL Bilirubin, total 0.4 0.2 - 1.0 MG/DL  
 ALT (SGPT) 24 12 - 78 U/L  
 AST (SGOT) 22 15 - 37 U/L Alk. phosphatase 61 45 - 117 U/L Protein, total 7.2 6.4 - 8.2 g/dL Albumin 3.3 (L) 3.5 - 5.0 g/dL Globulin 3.9 2.0 - 4.0 g/dL A-G Ratio 0.8 (L) 1.1 - 2.2    
TROPONIN I Collection Time: 10/21/20  6:56 AM  
Result Value Ref Range Troponin-I, Qt. <0.05 <0.05 ng/mL SAMPLES BEING HELD Collection Time: 10/21/20  6:56 AM  
Result Value Ref Range SAMPLES BEING HELD  RED, BLUE   
 COMMENT Add-on orders for these samples will be processed based on acceptable specimen integrity and analyte stability, which may vary by analyte. Radiologic Studies -  
XR SPINE THORAC 3 V Final Result  
impression: Moderate spondylosis . XR CHEST PA LAT Final Result IMPRESSION: No acute disease. No significant interval change. CT Results  (Last 48 hours) None CXR Results  (Last 48 hours) 10/21/20 0725  XR CHEST PA LAT Final result Impression:  IMPRESSION: No acute disease. No significant interval change. Narrative:  INDICATION: back rib pain, eval for CHF EXAM: CXR 2 Views. COMPARISON: 3/9/2019. FINDINGS: Frontal and lateral views of the chest show the lungs are clear. Heart  
size is normal. There is no pulmonary edema. There is no evident pneumothorax,  
adenopathy or pleural effusion. Medical Decision Making I am the first provider for this patient. I reviewed the vital signs, available nursing notes, past medical history, past surgical history, family history and social history. Vital Signs-Reviewed the patient's vital signs. Patient Vitals for the past 12 hrs: 
 Temp Pulse Resp BP SpO2  
10/21/20 0750  63 13 (!) 177/70 93 % 10/21/20 0720    (!) 169/83   
10/21/20 0702  65  (!) 190/87   
10/21/20 0700  71 16 (!) 190/87 95 % 10/21/20 0632 98.4 °F (36.9 °C) 82 16 (!) 205/102 95 % Records Reviewed: Nursing Notes and Old Medical Records Provider Notes (Medical Decision Making):  
Patient presenting with 3 separate complaints. The hypertension and ankle swelling bilaterally could be related either to salt intake, high blood pressure due to for blood pressure management, stress. We will try to manage her blood pressures by giving her her home blood pressure medicines first and then giving her either clonidine or IV antihypertensives if needed. Ankle swelling could be due to salt intake, new onset congestive heart failure liver failure renal failure. Could also be arthritis. Less likely gout. Regarding her back pain, it does all appear to be musculoskeletal nature given her tenderness over the paraspinal muscles in the musculature of the right mid back. Given her age however, will get x-rays to make sure no acute fracture though no trauma. ED Course:  
Initial assessment performed. The patients presenting problems have been discussed, and they are in agreement with the care plan formulated and outlined with them. I have encouraged them to ask questions as they arise throughout their visit. ED Course as of Oct 21 0801 Wed Oct 21, 2020 4115 Patient's lab work including troponin, kidney function, and liver function all came back fine as well as chest x-ray with no signs of pulmonary edema. Her blood pressure has improved. In speaking to patient more about the bilateral ankle swelling, I found out that patient does not move around very much at home and is very sedentary. This is likely the reason for the swelling in the ankles and discussed the importance of moving around and her veins and valves pushing the blood back up to the heart. Also discussed elevating the legs. No signs of DVT or anything else concerning at this time. With her back, still waiting on thoracic spine x-ray and if negative, will provide muscle pain analgesia [JS] ED Course User Index [JS] Nancy Dumont MD  
 
Critical Care Time:  
0 Disposition: 
Discharge Note: The patient has been re-evaluated and is ready for discharge. Reviewed available results with patient. Counseled patient on diagnosis and care plan. Patient has expressed understanding, and all questions have been answered. Patient agrees with plan and agrees to follow up as recommended, or to return to the ED if their symptoms worsen. Discharge instructions have been provided and explained to the patient, along with reasons to return to the ED. PLAN: 
Current Discharge Medication List  
  
START taking these medications Details  
methocarbamoL (Robaxin) 500 mg tablet Take 1 Tab by mouth four (4) times daily. Qty: 20 Tab, Refills: 0  
  
naproxen (NAPROSYN) 500 mg tablet Take 1 Tab by mouth every twelve (12) hours as needed for Pain. Qty: 20 Tab, Refills: 0  
  
lidocaine 4 % patch 1 Patch by TransDERmal route every twelve (12) hours every twelve (12) hours. Qty: 10 Patch, Refills: 0  
  
  
 
2. Follow-up Information Follow up With Specialties Details Why Contact Info  Hilary Escobar MD Family Medicine Schedule an appointment as soon as possible for a visit  [phone] 
[address] [address] [phone] 3. Return to ED if worse Diagnosis Clinical Impression: 1. Bilateral swelling of feet and ankles 2. Sedentary lifestyle 3. Essential hypertension 4. Strain of thoracic back region Attestations: 
 
Majo Sandhu M.D. Please note that this dictation was completed with [employer], the computer voice recognition software. Quite often unanticipated grammatical, syntax, homophones, and other interpretive errors are inadvertently transcribed by the computer software. Please disregard these errors. Please excuse any errors that have escaped final proofreading. Thank you.

## 2020-10-21 NOTE — ED NOTES
Pt discharged by MD . Pt provided with discharge instructions Rx and instructions on follow up care. Pt out of ED in wheelchair  accompanied by family.

## 2020-10-21 NOTE — DISCHARGE INSTRUCTIONS
Patient Education        Home Blood Pressure Test: About This Test  What is it? A home blood pressure test allows you to keep track of your blood pressure at home. Blood pressure is a measure of the force of blood against the walls of your arteries. Blood pressure readings include two numbers, such as 130/80 (say \"130 over 80\"). The first number is the systolic pressure. The second number is the diastolic pressure. Why is this test done? You may do this test at home to:  · Find out if you have high blood pressure. · Track your blood pressure if you have high blood pressure. · Track how well medicine is working to reduce high blood pressure. · Check how lifestyle changes, such as weight loss and exercise, are affecting blood pressure. How do you prepare for the test?  For at least 30 minutes before you take your blood pressure, don't exercise or use caffeine, tobacco, or medicines that raise blood pressure. Take your blood pressure while you feel comfortable and relaxed. Sit quietly with both feet on the floor for at least 5 minutes before the test.  How is the test done? · Sit with your arm slightly bent and resting on a table so that your upper arm is at the same level as your heart. · Roll up your sleeve or take off your shirt to expose your upper arm. · Wrap the blood pressure cuff around your upper arm so that the lower edge of the cuff is about 1 inch above the bend of your elbow. Proceed with the following steps depending on if you are using an automatic or manual pressure monitor. Automatic blood pressure monitors  · Press the on/off button on the automatic monitor and wait until the ready-to-measure \"heart\" symbol appears next to zero in the display window. · Press the start button. The cuff will inflate and deflate by itself. · Your blood pressure numbers will appear on the screen. · Write your numbers in your log book, along with the date and time.   Manual blood pressure monitors  · Place the earpieces of a stethoscope in your ears, and place the bell of the stethoscope over the artery, just below the cuff. · Close the valve on the rubber inflating bulb. · Squeeze the bulb rapidly with your opposite hand to inflate the cuff until the dial or column of mercury reads about 30 mm Hg higher than your usual systolic pressure. If you do not know your usual pressure, inflate the cuff to 210 mm Hg or until the pulse at your wrist disappears. · Open the pressure valve just slightly by twisting or pressing the valve on the bulb. · As you watch the pressure slowly fall, note the level on the dial at which you first start to hear a pulsing or tapping sound through the stethoscope. This is your systolic blood pressure. · Continue letting the air out slowly. The sounds will become muffled and will finally disappear. Note the pressure when the sounds completely disappear. This is your diastolic blood pressure. Let out all the remaining air. · Write your numbers in your log book, along with the date and time. Follow-up care is a key part of your treatment and safety. Be sure to make and go to all appointments, and call your doctor if you are having problems. It's also a good idea to keep a list of the medicines you take. Where can you learn more? Go to http://www.hector.com/  Enter C427 in the search box to learn more about \"Home Blood Pressure Test: About This Test.\"  Current as of: December 16, 2019               Content Version: 12.6  © 0313-9555 [employer]. Care instructions adapted under license by [employer] (which disclaims liability or warranty for this information). If you have questions about a medical condition or this instruction, always ask your healthcare professional. [place] [address] any warranty or liability for your use of this information.

## 2020-10-21 NOTE — ED NOTES
TRANSFER - IN REPORT: 
 
Verbal report received from Lizzette1 Kel Rivera (name) on [facility]. Report consisted of patients Situation, Background, Assessment and  
Recommendations(SBAR). Information from the following report(s) SBAR and ED Summary was reviewed with the receiving nurse. Opportunity for questions and clarification was provided.

## 2020-10-22 NOTE — PROGRESS NOTES
Patient's spouse Yaa Pitts  contacted regarding recent discharge and COVID-19 risk. Discussed COVID-19 related testing which was not done at this time. Care Transition Nurse/ Ambulatory Care Manager/ LPN Care Coordinator contacted the family by telephone to perform post discharge assessment. Verified name and  with family as identifiers. Patient has following risk factors of: HTN. CTN/ACM/LPN reviewed discharge instructions, medical action plan and red flags related to discharge diagnosis. Reviewed and educated them on any new and changed medications related to discharge diagnosis. Advised obtaining a 90-day supply of all daily and as-needed medications. Advance Care Planning:  
Does patient have an Advance Directive: no 
 
Education provided regarding infection prevention, and signs and symptoms of COVID-19 and when to seek medical attention with family who verbalized understanding. Discussed exposure protocols and quarantine from [address] you at higher risk for severe illness  and given an opportunity for questions and concerns. The family agrees to contact the COVID-19 hotline [phone] or PCP office for questions related to their healthcare. CTN/ACM/LPN provided contact information for future reference. From CDC: Are you at higher risk for severe illness?  Wash your hands often.  Avoid close contact (6 feet, which is about two arm lengths) with people who are sick.  Put distance between yourself and other people if COVID-19 is spreading in your community.  Clean and disinfect frequently touched surfaces.  Avoid all cruise travel and non-essential air travel.  Call your healthcare professional if you have concerns about COVID-19 and your underlying condition or if you are sick. For more information on steps you can take to protect yourself, see CDC's How to Protect Yourself Patient/family/caregiver given information for [employer] and agrees to enroll no Plan for follow-up call in 7-14 days based on severity of symptoms and risk factors.

## 2020-10-23 NOTE — ED PROVIDER NOTES
EMERGENCY DEPARTMENT HISTORY AND PHYSICAL EXAM 
 
 
Please note that this dictation was completed with the assistance of \"Dragon\", the computer voice recognition software. Quite often unanticipated grammatical, syntax, homophones, and other interpretive errors are inadvertently transcribed by the computer software. Please disregard these errors and any errors that have escaped final proofreading. Thank you. Patient Name: Jessica Pinon : 1940 MRN: [id number] History of Presenting Illness Chief Complaint Patient presents with  Hypertension Pt came via EMS for feeling restless. as per EMS pt stated that she woke up and felt like she is going to die. PTA her bp was 215/84. Pt is on room air and at rest.   
 
 
History Provided By: Patient and EMS 
 
HPI: Jessica Pinon, 78 y.o. female with past medical history as documented below presents to the ED with c/o of acute onset of SOB, chest tightness and a sense of \"impending doom. \" Pt states she woke up with sx's onset about 2 hours PTA. Per EMS, BP elevated at 215/84 but otherwise stable vitals. Pt recently seen in the ED for elevated BP, ankle swelling and back pain and discharged on Robaxin, Naproxen and Lidocaine patch. Pt states she has been compliant with her medications including metoprolol, hydrochlorothiazide, aspirin, potassium, magnesium. She reports mild associated cough, denies fever, sick contacts or exposure to COVID-19. She reports she did get a COVID-19 test two weeks ago prior to back surgery and it was negative. She denies hx of cardiac disease, denies recent prolonged travel, hx DVT or PE. Pt states sx's have all resolved by the time she arrived in ED. Pt denies any other alleviating or exacerbating factors.  Additionally, pt specifically denies any recent fever, chills, headache, nausea, vomiting, abdominal pain,  lightheadedness, dizziness, numbness, weakness, heart palpitations, urinary sxs, diarrhea, constipation, melena, hematochezia. There are no other complaints, changes or physical findings pertinent to the HPI at this time. PCP: Ray Torrez MD 
 
Past History Past Medical History: 
Past Medical History:  
Diagnosis Date  Arrhythmia LBBB  Arthritis SPINE  Calculus of kidney 1990  Cancer ([address] right breast X2 ; BCCA  Environmental allergies  GERD (gastroesophageal reflux disease)  Hypercholesterolemia  Hypertension  Nausea & vomiting  Other ill-defined conditions(799.89)   
 high cholesterol  Other ill-defined conditions(799.89) NO BP/VENIPUNCTURES RIGHT ARM (POST LYMPH NODE DISSECTION)  Other ill-defined conditions(799.89) MITRAL VALVE PROLAPSE  Recurrent malignant melanoma of skin ([address] 9/23/2020  Stroke ([address] TIA 2009  (TAKING ASA) Past Surgical History: 
Past Surgical History:  
Procedure Laterality Date  HX BACK SURGERY  1998  
 l4-l5  ([facility]) Harpreet Valderrama  
 right  HX BREAST RECONSTRUCTION Right 8/27/2014 Revision Right Reconstructed Breast performed by Cyril Mckeon MD at [facility] HX BREAST RECONSTRUCTION Bilateral 2/20/2015 REVISION RIGHT RECONSTRUCTED BREAST/REMOVE TISSUE EXPANDERS/PLACE IMPLANT RIGHT/MASTOPEXY LEFT performed by Cyril Mckeon MD at [address] [facility] HX CATARACT REMOVAL    
 BILATERAL  
 HX DILATION AND CURETTAGE    
 HX GI  2006 COLONOSCOPY  
 HX LITHOTRIPSY  1991  
 HX MASTECTOMY Right 2014  HX ORTHOPAEDIC  2005  
 left bunionectomy  HX MINAL AND BSO  HX TUBAL LIGATION    
 HX UROLOGICAL    
 kidney stone  HX UROLOGICAL  2016 BLADDER SLING Family History: 
Family History Problem Relation Age of Onset  Heart Disease Mother  Heart Disease Brother  Cancer Brother PROSTATE  Cancer Father LUNG--ASBESTOS EXPOSURE  
 Heart Disease Maternal Aunt  Heart Disease Brother  Anesth Problems Neg Hx Social History: 
Social History Tobacco Use  Smoking status: Never Smoker  Smokeless tobacco: Never Used Substance Use Topics  Alcohol use: No  
 Drug use: No  
 
 
Allergies: Allergies Allergen Reactions  Losartan Anaphylaxis  Other Medication Anaphylaxis Allergy shot  Augmentin [Amoxicillin-Pot Clavulanate] Rash  Contrast Agent [Iodine] Hives Face and neck red after adminstration  Tetracycline Rash Current Medications: No current facility-administered medications on file prior to encounter. Current Outpatient Medications on File Prior to Encounter Medication Sig Dispense Refill  methocarbamoL (Robaxin) 500 mg tablet Take 1 Tab by mouth four (4) times daily. 20 Tab 0  
 naproxen (NAPROSYN) 500 mg tablet Take 1 Tab by mouth every twelve (12) hours as needed for Pain. 20 Tab 0  
 lidocaine 4 % patch 1 Patch by TransDERmal route every twelve (12) hours every twelve (12) hours. 10 Patch 0  
 OTHER PREVAGEN DAILY  ezetimibe (Zetia) 10 mg tablet Take 10 mg by mouth daily.  atorvastatin (LIPITOR) 20 mg tablet Take 20 mg by mouth nightly.  doxazosin (CARDURA) 1 mg tablet Take  by mouth daily.  MULTIVITAMIN PO Take  by mouth daily.  B.infantis-B.ani-B.long-B.bifi (PROBIOTIC 4X) 10-15 mg TbEC Take  by mouth daily.  amoxicillin 500 mg tab Take  by mouth. 500 MG; TAKE 4 TABS PRIOR TO DENTAL WORK    
 hydrochlorothiazide (HYDRODIURIL) 25 mg tablet Take 12.5 mg by mouth daily.  metoprolol (LOPRESSOR) 50 mg tablet Take 1 Tab by mouth two (2) times a day. (Patient taking differently: Take 25 mg by mouth two (2) times a day.) 20 Tab 0  
 aspirin 81 mg tablet Take 81 mg by mouth daily.  calcium carbonate 1,000 mg Tab Take 1 Tab by mouth daily. Review of Systems Review of Systems Constitutional: Negative. Negative for chills and fever. HENT: Negative. Negative for congestion, facial swelling, rhinorrhea, sore throat, trouble swallowing and voice change. Eyes: Negative. Respiratory: Positive for cough, chest tightness and shortness of breath. Negative for apnea and wheezing. Cardiovascular: Positive for chest pain. Negative for palpitations and leg swelling. Gastrointestinal: Negative. Negative for abdominal distention, abdominal pain, blood in stool, constipation, diarrhea, nausea and vomiting. Endocrine: Negative. Negative for cold intolerance, heat intolerance and polyuria. Genitourinary: Negative. Negative for difficulty urinating, dysuria, flank pain, frequency, hematuria and urgency. Musculoskeletal: Negative. Negative for arthralgias, back pain, myalgias, neck pain and neck stiffness. Skin: Negative. Negative for color change and rash. Neurological: Negative. Negative for dizziness, syncope, facial asymmetry, speech difficulty, weakness, light-headedness, numbness and headaches. Hematological: Negative. Does not bruise/bleed easily. Psychiatric/Behavioral: Negative. Negative for confusion and self-injury. The patient is not nervous/anxious. Physical Exam  
Physical Exam 
Vitals signs and nursing note reviewed. Constitutional:   
   General: She is not in acute distress. Appearance: She is well-developed. She is not diaphoretic. Comments: Appears slightly anxious HENT:  
   Head: Normocephalic and atraumatic. Mouth/Throat:  
   Pharynx: No oropharyngeal exudate. Eyes:  
   Conjunctiva/sclera: Conjunctivae normal.  
   Pupils: Pupils are equal, round, and reactive to light. Neck: Musculoskeletal: Normal range of motion. Cardiovascular:  
   Rate and Rhythm: Normal rate and regular rhythm. Heart sounds: Normal heart sounds. No murmur. No friction rub. No gallop. Pulmonary:  
   Effort: Pulmonary effort is normal. No respiratory distress. Breath sounds: Normal breath sounds. No wheezing or rales. Chest:  
   Chest wall: Tenderness (reproducible chest all TTP, no rash, ecchymosis, equal BS bilaterally) present. Abdominal:  
   General: Bowel sounds are normal. There is no distension. Palpations: Abdomen is soft. There is no mass. Tenderness: There is no abdominal tenderness. There is no guarding or rebound. Musculoskeletal: Normal range of motion. General: No tenderness or deformity. Skin: 
   General: Skin is warm. Findings: No rash. Neurological:  
   Mental Status: She is alert and oriented to person, place, and time. Cranial Nerves: No cranial nerve deficit. Motor: No abnormal muscle tone. Coordination: Coordination normal.  
   Deep Tendon Reflexes: Reflexes normal.  
 
 
 
Diagnostic Study Results Labs -  
I have personally reviewed and interpreted all laboratory results. Recent Results (from the past 24 hour(s)) EKG, 12 LEAD, INITIAL Collection Time: 10/23/20  7:24 AM  
Result Value Ref Range Ventricular Rate 76 BPM  
 Atrial Rate 76 BPM  
 P-R Interval 184 ms QRS Duration 146 ms  
 Q-T Interval 430 ms QTC Calculation (Bezet) 483 ms Calculated P Axis 67 degrees Calculated R Axis -43 degrees Calculated T Axis 97 degrees Diagnosis Normal sinus rhythm Left axis deviation Left bundle branch block When compared with ECG of 17-NOV-2019 16:53, No significant change was found SAMPLES BEING HELD Collection Time: 10/23/20  7:26 AM  
Result Value Ref Range SAMPLES BEING HELD  1 BLUE   
 COMMENT Add-on orders for these samples will be processed based on acceptable specimen integrity and analyte stability, which may vary by analyte. Radiologic Studies -  
I have personally reviewed and interpreted all imaging studies and agree with radiology interpretation and report. No orders to display CT Results  (Last 48 hours) None CXR Results  (Last 48 hours) None Medical Decision Making I reviewed the vital signs, available nursing notes, past medical history, past surgical history, family history and social history. Vital Signs-Reviewed the patient's vital signs. Patient Vitals for the past 24 hrs: 
 Temp Pulse Resp BP SpO2  
10/23/20 0716 98.6 °F (37 °C) 86 18 (!) 174/79 94 % Pulse Oximetry Analysis - 94% on RA Cardiac Monitor:  
Rate: 86 bpm 
Rhythm: Normal Sinus Rhythm ED EKG interpretation: 
Rhythm: normal sinus rhythm; and regular . Rate (approx.): 76; Axis: left axis; P wave: normal; QRS interval: normal ; ST/T wave: normal; Other findings: LBBB. This EKG was interpreted by Aldean Goldmann, M.D. Records Reviewed: Nursing Notes, Old Medical Records, Previous electrocardiograms, Previous Radiology Studies and Previous Laboratory Studies Provider Notes (Medical Decision Making): DDx includes STEMI, NSTEMI, Angina, PE, Aortic Pathology, Chest Wall Pain, Pleurisy, Pneumonia, GERD/esophagitis, Anxiety. No cough/fever or focal lung findings to suggest pneumonia. No tachycardia, hypoxia or pleuritic component to suggest PE. Pulses symmetric and no extremely elevated BP/asymmetry or classic tearing sensation to suggest Aortic Dissection. Also, no neuro findings. No wretching/forceful vomiting to suggest esophageal disaster. Denies IV drug abuse, has native valves, no fevers/murmurs or skin lesions to suggest endocarditis. Will evaluate with EKG, labs, cardiac enzymes, chest x-ray. Will provide pain control and reassess. ED Course:  
I am the first provider for this patient's ED visit today. Initial assessment performed. I discussed presenting problems, concerns and my formulated plan for today's visit with the patient and any available family members at bedside. I encouraged them to ask questions as they arise throughout the visit.   
 
HYPERTENSION COUNSELING 
 For 10 minutes, education was provided to the patient today regarding their hypertension. Patient is made aware of their elevated blood pressure and is instructed to follow up this week with their Primary Care for a recheck. Patient is counseled regarding consequences of chronic, uncontrolled hypertension including kidney disease, heart disease, stroke or even death. Patient states their understanding and agrees to follow up this week. Additionally, during their visit, I discussed sodium restriction, maintaining ideal body weight and regular exercise program as physiologic means to achieve blood pressure control. The patient will strive towards this. I reviewed our electronic medical record system for any past medical records that were available that may contribute to the patient's current condition, the nursing notes and vital signs from today's visit. Margaret William MD 
 
ED Orders Placed : 
Orders Placed This Encounter  CBC WITH AUTOMATED DIFF  
 METABOLIC PANEL, COMPREHENSIVE  
 TROPONIN I  
 CK W/REFLEX CKMB  NT-PRO BNP  MAGNESIUM  
 SAMPLES BEING HELD  CARDIAC/RESPIRATORY MONITORING  
 EKG 12 LEAD INITIAL  
 INSERT PERIPHERAL IV ONE TIME STAT  
 
 
ED Medications Administered: 
Medications  
metoprolol tartrate (LOPRESSOR) tablet 50 mg (25 mg Oral Given 10/23/20 1009) hydroCHLOROthiazide (HYDRODIURIL) tablet 12.5 mg (12.5 mg Oral Given 10/23/20 0921) Progress Note: 
Given concerns for COVID-19 infection in this patient, I spent extra time to ensure proper and full PPE was used for the initial assessment and for subsequent patient encounters for updates and reassessments. This was done to help combat the transmission of the virus to myself and other patients and staff in the emergency department. Progress note: 
Pt notes feeling better after ED treatment. Pt ambulated with pulse ox with saturations maintain > 92% and tolerated well.  Discussed lab and imaging findings with pt, specifically noting possible COVID-19 infection. Patient instructed on proper hygiene and self-quarantine, as well as lying prone for 3 hours a day. Pt instructed to self-isolate at home until 3 days after symptoms have resolved AND 7 days after symptoms first started, whichever is later. Provided with [address] handout for likely COVID infection recommendations. Pt will follow up with health department or this emergency department immediately should symptoms worsen at any time as instructed. All questions have been answered, pt voiced understanding and agreement with plan. Progress Note: 
Patient has been reassessed and reports feeling better and symptoms have improved significantly after ED treatment. Aminata Ellington's final labs and imaging have been reviewed with her and available family and/or caregiver. They have been counseled regarding her diagnosis. She verbally conveys understanding and agreement of the signs, symptoms, diagnosis, treatment and prognosis and additionally agrees to follow up as recommended with Dr. Herb Vega MD and/or specialist in 24 - 48 hours. She also agrees with the care-plan we created together and conveys that all of her questions have been answered. I have also put together some discharge instructions for her that include: 1) educational information regarding their diagnosis, 2) how to care for their diagnosis at home, as well a 3) list of reasons why they would want to return to the ED prior to their follow-up appointment should the patient's condition change or symptoms worsen. I have answered all questions to the patient's satisfaction. Strict return precautions given. She both understood and agreed with plan as discussed. Vital signs stable for discharge. Pt very appreciative of care today. Disposition: Discharge The pt is ready for discharge.  The pt's signs, symptoms, diagnosis, and discharge instructions have been discussed and pt and/or family have conveyed their understanding. The pt is to follow up as recommended or return to ER should their symptoms worsen. Plan has been discussed and all parties are in full agreement. Plan: 1. Return precautions as discussed with patient and available family and/or caregiver. 2.  
Discharge Medication List as of 10/23/2020 11:05 AM  
  
START taking these medications Details  
traMADoL (Ultram) 50 mg tablet Take 1 Tab by mouth every four (4) hours as needed for Pain for up to 3 days. Max Daily Amount: 300 mg., Normal, Disp-18 Tab,R-0  
  
albuterol (PROVENTIL HFA, VENTOLIN HFA, PROAIR HFA) 90 mcg/actuation inhaler Take 2 Puffs by inhalation every six (6) hours as needed for Wheezing., Normal, Disp-1 Inhaler,R-0  
  
zinc sulfate 220 mg tablet Take 1 Tab by mouth two (2) times a day for 5 days. , Normal, Disp-10 Tab,R-0  
  
ascorbic acid, vitamin C, (VITAMIN C) 500 mg tablet Take 1 Tab by mouth two (2) times a day for 5 days. , Normal, Disp-10 Tab,R-0 CONTINUE these medications which have NOT CHANGED Details  
pravastatin (PRAVACHOL) 40 mg tablet Take 40 mg by mouth nightly., Historical Med  
  
potassium chloride SA (MICRO-K) 10 mEq capsule Take 10 mEq by mouth two (2) times a day., Historical Med  
  
magnesium 250 mg tab Take  by mouth., Historical Med  
  
fish oil-omega-3 fatty acids (Fish Oil) 340-1,000 mg capsule Take 1 Cap by mouth daily. , Historical Med  
  
methocarbamoL (Robaxin) 500 mg tablet Take 1 Tab by mouth four (4) times daily. , Normal, Disp-20 Tab,R-0  
  
lidocaine 4 % patch 1 Patch by TransDERmal route every twelve (12) hours every twelve (12) hours. , Normal, Disp-10 Patch,R-0  
  
ezetimibe (Zetia) 10 mg tablet Take 10 mg by mouth daily. , Historical Med  
  
doxazosin (CARDURA) 1 mg tablet Take  by mouth daily. , Historical Med MULTIVITAMIN PO Take  by mouth daily. , Historical Med  
  
 hydrochlorothiazide (HYDRODIURIL) 25 mg tablet Take 12.5 mg by mouth daily. , Historical Med  
  
metoprolol (LOPRESSOR) 50 mg tablet Take 1 Tab by mouth two (2) times a day., Print, Disp-20 Tab, R-0  
  
aspirin 81 mg tablet Take 81 mg by mouth daily. , Historical Med  
  
calcium carbonate 1,000 mg Tab Take 1 Tab by mouth daily. , Historical Med  
  
naproxen (NAPROSYN) 500 mg tablet Take 1 Tab by mouth every twelve (12) hours as needed for Pain., Normal, Disp-20 Tab,R-0 OTHER PREVAGEN DAILY, Historical Med  
  
atorvastatin (LIPITOR) 20 mg tablet Take 20 mg by mouth nightly., Historical Med  
  
B.infantis-B.ani-B.long-B.bifi (PROBIOTIC 4X) 10-15 mg TbEC Take  by mouth daily. , Historical Med  
  
amoxicillin 500 mg tab Take  by mouth. 500 MG; TAKE 4 TABS PRIOR TO DENTAL WORK, Historical Med 3. Follow-up Information Follow up With Specialties Details Why Contact Info See Montoya MD [facility] Medicine   [address] [facility] [place] [id number] [phone] \[facility]\"" EMERGENCY DEPT Emergency Medicine  As needed, If symptoms worsen [address] [facility] [address] [facility] 
[phone] Instructed to return to ED if worse Diagnosis Clinical Impression: 1. Acute chest pain 2. Atypical chest pain 3. Suspected COVID-19 virus infection 4. Accelerated hypertension Attestation: 
Hossein Esquivel MD, am the attending of record for this patient. I personally performed the services described in this documentation on this date, 10/23/2020 for patient, Prachi Escobar. I have reviewed the chart and verified that the record is accurate and complete.

## 2020-10-23 NOTE — DISCHARGE INSTRUCTIONS
Patient Education        Chest Pain: Care Instructions  Your Care Instructions     There are many things that can cause chest pain. Some are not serious and will get better on their own in a few days. But some kinds of chest pain need more testing and treatment. Your doctor may have recommended a follow-up visit in the next 8 to 12 hours. If you are not getting better, you may need more tests or treatment. Even though your doctor has released you, you still need to watch for any problems. The doctor carefully checked you, but sometimes problems can develop later. If you have new symptoms or if your symptoms do not get better, get medical care right away. If you have worse or different chest pain or pressure that lasts more than 5 minutes or you passed out (lost consciousness), call 911 or seek other emergency help right away. A medical visit is only one step in your treatment. Even if you feel better, you still need to do what your doctor recommends, such as going to all suggested follow-up appointments and taking medicines exactly as directed. This will help you recover and help prevent future problems. How can you care for yourself at home? · Rest until you feel better. · Take your medicine exactly as prescribed. Call your doctor if you think you are having a problem with your medicine. · Do not drive after taking a prescription pain medicine. When should you call for help? Call 911 if:     · You passed out (lost consciousness).     · You have severe difficulty breathing.     · You have symptoms of a heart attack. These may include:  ? Chest pain or pressure, or a strange feeling in your chest.  ? Sweating. ? Shortness of breath. ? Nausea or vomiting. ? Pain, pressure, or a strange feeling in your back, neck, jaw, or upper belly or in one or both shoulders or arms. ? Lightheadedness or sudden weakness. ? A fast or irregular heartbeat.   After you call 911, the  may tell you to chew 1 adult-strength or 2 to 4 low-dose aspirin. Wait for an ambulance. Do not try to drive yourself. Call your doctor today if:     · You have any trouble breathing.     · Your chest pain gets worse.     · You are dizzy or lightheaded, or you feel like you may faint.     · You are not getting better as expected.     · You are having new or different chest pain. Where can you learn more? Go to http://www.ScoreGrid.com/  Enter A120 in the search box to learn more about \"Chest Pain: Care Instructions. \"  Current as of: June 26, 2019               Content Version: 12.6  © 8732-9723 "[employer]. Care instructions adapted under license by [employer] (which disclaims liability or warranty for this information). If you have questions about a medical condition or this instruction, always ask your healthcare professional. [address] any warranty or liability for your use of this information. Patient Education        Musculoskeletal Chest Pain: Care Instructions  Your Care Instructions     Chest pain is not always a sign that something is wrong with your heart or that you have another serious problem. The doctor thinks your chest pain is caused by strained muscles or ligaments, inflamed chest cartilage, or another problem in your chest, rather than by your heart. You may need more tests to find the cause of your chest pain. Follow-up care is a key part of your treatment and safety. Be sure to make and go to all appointments, and call your doctor if you are having problems. It's also a good idea to know your test results and keep a list of the medicines you take. How can you care for yourself at home? · Take pain medicines exactly as directed. ? If the doctor gave you a prescription medicine for pain, take it as prescribed. ? If you are not taking a prescription pain medicine, ask your doctor if you can take an over-the-counter medicine.   · Rest and protect the sore area. · Stop, change, or take a break from any activity that may be causing your pain or soreness. · Put ice or a cold pack on the sore area for 10 to 20 minutes at a time. Try to do this every 1 to 2 hours for the next 3 days (when you are awake) or until the swelling goes down. Put a thin cloth between the ice and your skin. · After 2 or 3 days, apply a heating pad set on low or a warm cloth to the area that hurts. Some doctors suggest that you go back and forth between hot and cold. · Do not wrap or tape your ribs for support. This may cause you to take smaller breaths, which could increase your risk of lung problems. · Mentholated creams such as Bengay or Icy Hot may soothe sore muscles. Follow the instructions on the package. · Follow your doctor's instructions for exercising. · Gentle stretching and massage may help you get better faster. Stretch slowly to the point just before pain begins, and hold the stretch for at least 15 to 30 seconds. Do this 3 or 4 times a day. Stretch just after you have applied heat. · As your pain gets better, slowly return to your normal activities. Any increased pain may be a sign that you need to rest a while longer. When should you call for help? Call 911 anytime you think you may need emergency care. For example, call if:    · You have chest pain or pressure. This may occur with:  ? Sweating. ? Shortness of breath. ? Nausea or vomiting. ? Pain that spreads from the chest to the neck, jaw, or one or both shoulders or arms. ? Dizziness or lightheadedness. ? A fast or uneven pulse. After calling 911, chew 1 adult-strength aspirin. Wait for an ambulance. Do not try to drive yourself.     · You have sudden chest pain and shortness of breath, or you cough up blood.    Call your doctor now or seek immediate medical care if:    · You have any trouble breathing.     · Your chest pain gets worse.     · Your chest pain occurs consistently with exercise and is relieved by rest.   Watch closely for changes in your health, and be sure to contact your doctor if:    · Your chest pain does not get better after 1 week. Where can you learn more? Go to http://www.hector.com/  Enter V293 in the search box to learn more about \"Musculoskeletal Chest Pain: Care Instructions. \"  Current as of: June 26, 2019               Content Version: 12.6  © 0816-2528 [employer]. Care instructions adapted under license by [employer] (which disclaims liability or warranty for this information). If you have questions about a medical condition or this instruction, always ask your healthcare professional. [place] [address] any warranty or liability for your use of this information. Patient Education        Viral Infections: Care Instructions  Your Care Instructions     You don't feel well, but it's not clear what's causing it. You may have a viral infection. Viruses cause many illnesses, such as the common cold, influenza, fever, rashes, and the diarrhea, nausea, and vomiting that are often called \"stomach flu. \" You may wonder if antibiotic medicines could make you feel better. But antibiotics only treat infections caused by bacteria. They don't work on viruses. The good news is that viral infections usually aren't serious. Most will go away in a few days without medical treatment. In the meantime, there are a few things you can do to make yourself more comfortable. Follow-up care is a key part of your treatment and safety. Be sure to make and go to all appointments, and call your doctor if you are having problems. It's also a good idea to know your test results and keep a list of the medicines you take. How can you care for yourself at home? · Get plenty of rest if you feel tired. · Take an over-the-counter pain medicine if needed, such as acetaminophen (Tylenol), ibuprofen (Advil, Motrin), or naproxen (Aleve). Read and follow all instructions on the label. · Be careful when taking over-the-counter cold or flu medicines and Tylenol at the same time. Many of these medicines have acetaminophen, which is Tylenol. Read the labels to make sure that you are not taking more than the recommended dose. Too much acetaminophen (Tylenol) can be harmful. · Drink plenty of fluids, enough so that your urine is light yellow or clear like water. If you have kidney, heart, or liver disease and have to limit fluids, talk with your doctor before you increase the amount of fluids you drink. · Stay home from work, school, and other public places while you have a fever. When should you call for help? Call 911 anytime you think you may need emergency care. For example, call if:    · You have severe trouble breathing.     · You passed out (lost consciousness). Call your doctor now or seek immediate medical care if:    · You seem to be getting much sicker.     · You have a new or higher fever.     · You have blood in your stools.     · You have new belly pain, or your pain gets worse.     · You have a new rash. Watch closely for changes in your health, and be sure to contact your doctor if:    · You start to get better and then get worse.     · You do not get better as expected. Where can you learn more? Go to http://www.gray.com/  Enter L906 in the search box to learn more about \"Viral Infections: Care Instructions. \"  Current as of: February 11, 2020               Content Version: 12.6  © 2006-2020 [employer]. Care instructions adapted under license by [employer] (which disclaims liability or warranty for this information). If you have questions about a medical condition or this instruction, always ask your healthcare professional. [place] [address] any warranty or liability for your use of this information.

## 2020-10-23 NOTE — ED NOTES
Pt came via ems. As per EMS report Pt woke and felt like she is going to die, Pt was hypertensive systolic>200 prior to arrival. Pt's BP on arrival was 174/79, Pt reports difficulty breathing but is at rest on room air, sats > 92%. 0710: Bedside and Verbal shift change report given to Lucas Rivera (oncoming nurse) by Yael Lara (offgoing nurse). Report included the following information SBAR, Kardex, ED Summary, Procedure Summary, MAR and Recent Results.

## 2020-10-23 NOTE — ED NOTES
10:12  Pt medicated per MD order. Plan of care discussed. Call bell in reach. Will continue to monitor.

## 2020-10-24 NOTE — PROGRESS NOTES
Patient contacted regarding COVID-19  risk. Discussed COVID-19 related testing which was pending at this time. Test results were pending. Patient informed of results, if available? Result Pending. Outreach made within 2 business days of discharge: Yes Care Transition Nurse/ Ambulatory Care Manager/ LPN Care Coordinator contacted the patient by telephone to perform post discharge assessment. Verified name and  with patient as identifiers. Provided introduction to self, and explanation of the CTN/ACM/LPN role, and reason for call due to risk factors for infection and/or exposure to COVID-19. Symptoms reviewed with patient who verbalized the following symptoms: fatigue, no new symptoms and no worsening symptoms. Due to no new or worsening symptoms encounter was not routed to provider for escalation. Discussed follow-up appointments. If no appointment was previously scheduled, appointment scheduling offered: N/A 
[facility] follow up appointment(s):  
Future Appointments Date Time Provider Susan Manzo 10/26/2020  9:20 AM Cheli Moore NP [facility] BS [facility] follow up appointment(s):  Pt was advised to f/u with PCP (Dr. Bj Abebe Provider) post-discharge. ACM reminded patient today of recommended outpatient follow-up; pt verbalizes understanding. Pt reports that she has her surgical f/u scheduled with NP on Monday, 10/26. Advance Care Planning:  
Does patient have an Advance Directive: currently not on file; education provided  and ACP referral placed. Patient is unable to recall if she has an Advance Medical Directive; states that she has a piece of paper that she completed previously that is on file at her home, however she is unsure if it is an Advance Medical Directive. Patient has following risk factors of: no known risk factors.  CTN/ACM/LPN reviewed discharge instructions, medical action plan and red flags such as increased shortness of breath, increasing fever and signs of decompensation with patient who verbalized understanding. Discussed exposure protocols and quarantine with CDC Guidelines What to do if you are sick with coronavirus disease 2019.  Patient was given an opportunity for questions and concerns. The patient agrees to contact the [facility] exposure line [phone], [facility] department [address]  ([phone]) and PCP office for questions related to their healthcare. CTN/ACM provided contact information for future needs. Reviewed and educated patient on any new and changed medications related to discharge diagnosis; Rx - tramadol, albuterol hfa, zinc sulfate, vitamin c . Patient/family/caregiver given information for [employer] and agrees to enroll no Patient's preferred e-mail:  N/A Patient's preferred phone number: N/A Based on Loop alert triggers, patient will be contacted by nurse care manager for worsening symptoms. Plan for follow-up call in 3-5 days based on severity of symptoms and risk factors.

## 2020-10-24 NOTE — ACP (ADVANCE CARE PLANNING)
Does patient have an Advance Directive: currently not on file; education provided  and ACP referral placed. Patient is unable to recall if she has an Advance Medical Directive; states that she has a piece of paper that she completed previously that is on file at her home, however she is unsure if it is an Advance Medical Directive.

## 2020-10-28 NOTE — PROGRESS NOTES
Patient contacted regarding COVID-19 risk and screening. Discussed COVID-19 related testing which was available at this time. Test results were negative. Patient informed of results, if available? yes Care Transition Nurse/ Ambulatory Care Manager/ LPN Care Coordinator contacted the patient by telephone to perform follow-up assessment. Verified name and  with patient as identifiers. Symptoms reviewed with patient who verbalized the following symptoms: fatigue and no new symptoms, no worsening symptoms. Due to no new or worsening symptoms encounter was not routed to provider for escalation. Education provided regarding infection prevention, and signs and symptoms of COVID-19 and when to seek medical attention with patient who verbalized understanding. Discussed exposure protocols and quarantine from [address] you at higher risk for severe illness  and given an opportunity for questions and concerns. The patient agrees to contact the COVID-19 hotline [phone] or PCP office for questions related to their healthcare. CTN/ACM/LPN provided contact information for future reference. From CDC: Are you at higher risk for severe illness?  Wash your hands often.  Avoid close contact (6 feet, which is about two arm lengths) with people who are sick.  Put distance between yourself and other people if COVID-19 is spreading in your community.  Clean and disinfect frequently touched surfaces.  Avoid all cruise travel and non-essential air travel.  Call your healthcare professional if you have concerns about COVID-19 and your underlying condition or if you are sick. For more information on steps you can take to protect yourself, see CDC's How to Protect Yourself Plan for follow-up call in 7-14 days based on severity of symptoms and risk factors.

## 2020-11-04 NOTE — PATIENT INSTRUCTIONS
Melanoma Excision Surgery: What to Expect at [facility] Your Recovery Excision of a melanoma is a type of surgery to remove, or excise, a melanoma from your skin. Melanoma is a form of skin cancer in which abnormal skin cells grow out of control. You may have stitches until the surgical wound heals. This may cause a scar that should fade with time. How quickly your wound heals depends on its size. Most wounds take 1 to 3 weeks to heal. If a large area of skin was removed, you may have a skin graft. In that case, healing may take longer. Some soreness around the site of the wound is normal. Your doctor may recommend an over-the-counter medicine or give you a prescription to help if you have pain. Your doctor may give you specific instructions on when you can do your normal activities again, such as driving and going back to work. This care sheet gives you a general idea about how long it will take for you to recover. But each person recovers at a different pace. Follow the steps below to get better as quickly as possible. How can you care for yourself at home? Activity 
  · If you have stitches, check with your doctor about when you can do your normal activities.  
  · If you have a skin graft, avoid exercise that stretches the skin graft for at least 3 weeks after surgery, unless your doctor gives you other instructions. Medicines 
  · Your doctor will tell you if and when you can restart your medicines. He or she will also give you instructions about taking any new medicines.  
  · If you take aspirin or some other blood thinner, ask your doctor if and when to start taking it again. Make sure that you understand exactly what your doctor wants you to do.  
  · Be safe with medicines. Read and follow all instructions on the label. ? If the doctor gave you a prescription medicine for pain, take it as prescribed.  
? If you are not taking a prescription pain medicine, ask your doctor if you can take an over-the-counter medicine. Wound care 
  · You will have a dressing over the wound. A dressing helps the wound heal and protects it. Your doctor will tell you how to take care of this.  
  · If you have stitches, your doctor will tell you when to come back to have them removed.  
  · If you have a skin graft, your doctor will tell you how to change the bandages and when you don't need them anymore.  
  · Wash the area daily with warm, soapy water, and pat it dry. Don't use hydrogen peroxide or alcohol. They can slow healing.  
  · You may shower 24 to 48 hours after surgery. Pat the wound dry. Do not take a bath for the first 2 weeks, or until your doctor tells you it is okay.  
  · If you have a skin graft, don't rub it for 3 to 4 weeks. Follow-up care is a key part of your treatment and safety. Be sure to make and go to all appointments, and call your doctor if you are having problems. It's also a good idea to know your test results and keep a list of the medicines you take. When should you call for help? Call 911 anytime you think you may need emergency care. For example, call if: 
  · You passed out (lost consciousness).  
  · You are short of breath. Call your doctor now or seek immediate medical care if: 
  · You have pain that does not get better after you take pain medicine.  
  · You have loose stitches, or your wound comes open.  
  · Bright red blood has soaked through the bandage over your incision.  
  · You cannot pass stools or gas.  
  · You are sick to your stomach or cannot drink fluids.  
  · You have signs of a blood clot in your leg (called a deep vein thrombosis), such as: 
? Pain in your calf, back of the knee, thigh, or groin. ? Redness or swelling in your leg.  
  · You have symptoms of infection, such as: 
? Increased pain, swelling, warmth, or redness. ? Red streaks leading from the incision. ? Pus draining from the incision. ? A fever. Watch closely for changes in your health, and be sure to contact your doctor if you have any problems. Where can you learn more? Go to http://www.gray.com/ Enter B437 in the search box to learn more about \"Melanoma Excision Surgery: What to Expect at Home. \" Current as of: April 29, 2020               Content Version: 12.6 © 6587-6802 [employer]. Care instructions adapted under license by [employer] (which disclaims liability or warranty for this information). If you have questions about a medical condition or this instruction, always ask your healthcare professional. [place] [address] any warranty or liability for your use of this information.

## 2020-11-04 NOTE — PROGRESS NOTES
1. Have you been to the ER, urgent care clinic since your last visit? Hospitalized since your last visit? Yes 10/21 and 10/23 HTN and back pain [facility] ER 
 
2. Have you seen or consulted any other health care providers outside of the [address] [place] [address] since your last visit? Include any pap smears or colon screening.  No

## 2020-11-04 NOTE — PROGRESS NOTES
Subjective:  
 Oscar Moreno is a 78 y.o. female presents for postop care following wide re-excision of melanoma on back by Dr. Mandeep Mosqueda. She is receiving wound care by self who reports no problems with wound care. The patient is not having any pain. Pt has followup with dermatologist scheduled. Objective:  
 
Visit Vitals BP (!) 156/81 (BP 1 Location: Left arm, BP Patient Position: Sitting) Pulse 84 Temp 98.2 °F (36.8 °C) (Oral) Resp 18 Ht 5' 3\" (1.6 m) Wt 133 lb (60.3 kg) SpO2 96% BMI 23.56 kg/m² Wound:  Location: back 
clean, dry, no drainage, healed, closed with sutures 
periwound skin intact, no erythema or induration Clinical History Recurrent melanoma back FINAL PATHOLOGIC DIAGNOSIS Back, recurrent melanoma; wide excision:  
Malignant melanoma (0.7 x 0.5 x 0.3 cm), clinically recurrent; (see Synoptic Report). All surgical resection margins are negative for tumor. MELANOMA OF THE SKIN: Excision, Re-Excision SPECIMEN Procedure: Re-excision TUMOR Histologic Type: Superficial spreading Maximum Tumor (Breslow) Thickness (Millimeters): 3 mm Macroscopic Satellite Nodule(s): Not identified Ulceration: Not identified Mitotic Rate: 1 mitoses / mm2 Microsatellite(s): Not identified Lymphovascular Invasion: Suspicious focus Neurotropism: Not identified Tumor-Infiltrating Lymphocytes: Not identified Tumor Regression: Not identified MARGINS Peripheral Margins: Negative for invasive and in situ melanoma Deep Margin: Negative for invasive melanoma LYMPH NODES Regional Lymph Nodes: No lymph nodes submitted or found Assessment: S/P same. Doing well postoperatively. Plan:  
 
1. Wound care discussed. healed  Removed sutures 2. Pt is to increase activities as tolerated. Tonye Cogan 3. Follow-up prn Ms. Deion Garcia has a reminder for a \"due or due soon\" health maintenance.  I have asked that she contact her primary care provider for follow-up on this health maintenance. Patient verbalized understanding and agreement.

## 2020-11-09 NOTE — PROGRESS NOTES
Patient resolved from Transition of Care episode on 11/9/2020 . Discussed COVID-19 related testing which was available at this time. Test results were negative. Patient informed of results, if available? yes; previously notified by this ACM. Patient/family has been provided the following resources and education related to COVID-19:  
           
          Signs, symptoms and red flags related to COVID-19 [facility] exposure and quarantine guidelines [facility] exposure contact - [phone] Contact for their local Department of Health Patient currently reports that the following symptoms have improved:  fatigue, no new symptoms and no worsening symptoms. Patient states, \"I'm feeling better. \"  
Patient reports that fatigue is improving and she has been able to increase her physical activity this week; she went to [employer] yesterday and the grocery store today. No further outreach scheduled with this CTN/ACM/LPN/HC/ MA. Episode of Care resolved. Patient has this CTN/ACM/LPN/HC/MA contact information if future needs arise.

## 2020-11-18 NOTE — TELEPHONE ENCOUNTER
ACP Specialist contacted patient for ACP referral.  Patient confirmed that she has a sheet of paper somewhere in her home that may be a [employer] but she was unsure where it was. ACP specialist offered to assist patient in completing a new form. Mrs. Baldo Bledsoe stated that \"I know I need to do it but I can do it on my on. \"  Patient advised ACP Specialist to mail her the [place] directive form and she may complete it. ACP Specialist confirmed that patient emergency contact was her . All listed information was accurate. No additional contact will be made after document is mailed.

## 2021-01-01 ENCOUNTER — HOME CARE VISIT (OUTPATIENT)
Dept: SCHEDULING | Facility: HOME HEALTH | Age: 81
End: 2021-01-01
Payer: MEDICARE

## 2021-01-01 ENCOUNTER — APPOINTMENT (OUTPATIENT)
Dept: GENERAL RADIOLOGY | Age: 81
DRG: 915 | End: 2021-01-01
Attending: INTERNAL MEDICINE
Payer: MEDICARE

## 2021-01-01 ENCOUNTER — APPOINTMENT (OUTPATIENT)
Dept: NON INVASIVE DIAGNOSTICS | Age: 81
DRG: 915 | End: 2021-01-01
Attending: INTERNAL MEDICINE
Payer: MEDICARE

## 2021-01-01 ENCOUNTER — TELEPHONE (OUTPATIENT)
Dept: SURGERY | Age: 81
End: 2021-01-01

## 2021-01-01 ENCOUNTER — APPOINTMENT (OUTPATIENT)
Dept: CT IMAGING | Age: 81
DRG: 915 | End: 2021-01-01
Attending: EMERGENCY MEDICINE
Payer: MEDICARE

## 2021-01-01 ENCOUNTER — HOME CARE VISIT (OUTPATIENT)
Dept: HOSPICE | Facility: HOSPICE | Age: 81
End: 2021-01-01
Payer: MEDICARE

## 2021-01-01 ENCOUNTER — TRANSCRIBE ORDER (OUTPATIENT)
Dept: SCHEDULING | Age: 81
End: 2021-01-01

## 2021-01-01 ENCOUNTER — APPOINTMENT (OUTPATIENT)
Dept: CT IMAGING | Age: 81
DRG: 176 | End: 2021-01-01
Attending: HOSPITALIST
Payer: MEDICARE

## 2021-01-01 ENCOUNTER — OFFICE VISIT (OUTPATIENT)
Dept: SURGERY | Age: 81
End: 2021-01-01
Payer: MEDICARE

## 2021-01-01 ENCOUNTER — HOSPITAL ENCOUNTER (OUTPATIENT)
Dept: PET IMAGING | Age: 81
Discharge: HOME | End: 2021-03-10
Attending: INTERNAL MEDICINE
Payer: MEDICARE

## 2021-01-01 ENCOUNTER — HOSPITAL ENCOUNTER (OUTPATIENT)
Dept: PREADMISSION TESTING | Age: 81
Discharge: HOME | End: 2021-07-09
Payer: MEDICARE

## 2021-01-01 ENCOUNTER — ANESTHESIA (OUTPATIENT)
Dept: SURGERY | Age: 81
End: 2021-01-01
Payer: MEDICARE

## 2021-01-01 ENCOUNTER — APPOINTMENT (OUTPATIENT)
Dept: GENERAL RADIOLOGY | Age: 81
DRG: 915 | End: 2021-01-01
Attending: EMERGENCY MEDICINE
Payer: MEDICARE

## 2021-01-01 ENCOUNTER — APPOINTMENT (OUTPATIENT)
Dept: MRI IMAGING | Age: 81
DRG: 915 | End: 2021-01-01
Attending: EMERGENCY MEDICINE
Payer: MEDICARE

## 2021-01-01 ENCOUNTER — HOSPITAL ENCOUNTER (INPATIENT)
Age: 81
LOS: 7 days | Discharge: NURSING, REHAB OR LONG-TERM CARE | DRG: 915 | End: 2021-05-24
Attending: EMERGENCY MEDICINE | Admitting: INTERNAL MEDICINE
Payer: MEDICARE

## 2021-01-01 ENCOUNTER — HOSPITAL ENCOUNTER (OUTPATIENT)
Age: 81
Setting detail: OBSERVATION
Discharge: HOME | End: 2021-07-18
Attending: SURGERY | Admitting: SURGERY
Payer: MEDICARE

## 2021-01-01 ENCOUNTER — APPOINTMENT (OUTPATIENT)
Dept: CT IMAGING | Age: 81
DRG: 176 | End: 2021-01-01
Attending: EMERGENCY MEDICINE
Payer: MEDICARE

## 2021-01-01 ENCOUNTER — APPOINTMENT (OUTPATIENT)
Dept: GENERAL RADIOLOGY | Age: 81
DRG: 176 | End: 2021-01-01
Attending: INTERNAL MEDICINE
Payer: MEDICARE

## 2021-01-01 ENCOUNTER — APPOINTMENT (OUTPATIENT)
Dept: GENERAL RADIOLOGY | Age: 81
DRG: 176 | End: 2021-01-01
Attending: FAMILY MEDICINE
Payer: MEDICARE

## 2021-01-01 ENCOUNTER — ANESTHESIA EVENT (OUTPATIENT)
Dept: SURGERY | Age: 81
End: 2021-01-01
Payer: MEDICARE

## 2021-01-01 ENCOUNTER — HOSPICE ADMISSION (OUTPATIENT)
Dept: HOSPICE | Facility: HOSPICE | Age: 81
End: 2021-01-01
Payer: MEDICARE

## 2021-01-01 ENCOUNTER — APPOINTMENT (OUTPATIENT)
Dept: ULTRASOUND IMAGING | Age: 81
DRG: 915 | End: 2021-01-01
Attending: INTERNAL MEDICINE
Payer: MEDICARE

## 2021-01-01 ENCOUNTER — HOSPITAL ENCOUNTER (INPATIENT)
Age: 81
LOS: 13 days | Discharge: HOSPICE | DRG: 176 | End: 2021-08-10
Attending: EMERGENCY MEDICINE | Admitting: FAMILY MEDICINE
Payer: MEDICARE

## 2021-01-01 VITALS
OXYGEN SATURATION: 89 % | DIASTOLIC BLOOD PRESSURE: 75 MMHG | HEART RATE: 114 BPM | SYSTOLIC BLOOD PRESSURE: 115 MMHG | RESPIRATION RATE: 16 BRPM

## 2021-01-01 VITALS
OXYGEN SATURATION: 94 % | HEIGHT: 63 IN | TEMPERATURE: 98.7 F | SYSTOLIC BLOOD PRESSURE: 169 MMHG | DIASTOLIC BLOOD PRESSURE: 47 MMHG | HEART RATE: 74 BPM | BODY MASS INDEX: 24.63 KG/M2 | WEIGHT: 139 LBS | RESPIRATION RATE: 16 BRPM

## 2021-01-01 VITALS
HEART RATE: 74 BPM | TEMPERATURE: 98.5 F | OXYGEN SATURATION: 95 % | SYSTOLIC BLOOD PRESSURE: 137 MMHG | HEIGHT: 63 IN | DIASTOLIC BLOOD PRESSURE: 74 MMHG | BODY MASS INDEX: 23.57 KG/M2 | WEIGHT: 133 LBS | RESPIRATION RATE: 16 BRPM

## 2021-01-01 VITALS
HEART RATE: 73 BPM | RESPIRATION RATE: 26 BRPM | OXYGEN SATURATION: 91 % | BODY MASS INDEX: 25.29 KG/M2 | TEMPERATURE: 98.6 F | WEIGHT: 142.7 LBS | HEIGHT: 63 IN | SYSTOLIC BLOOD PRESSURE: 135 MMHG | DIASTOLIC BLOOD PRESSURE: 71 MMHG

## 2021-01-01 VITALS
HEART RATE: 112 BPM | SYSTOLIC BLOOD PRESSURE: 110 MMHG | OXYGEN SATURATION: 95 % | TEMPERATURE: 98.2 F | DIASTOLIC BLOOD PRESSURE: 68 MMHG | RESPIRATION RATE: 16 BRPM

## 2021-01-01 VITALS
HEART RATE: 96 BPM | HEIGHT: 63 IN | SYSTOLIC BLOOD PRESSURE: 183 MMHG | RESPIRATION RATE: 16 BRPM | BODY MASS INDEX: 24.26 KG/M2 | DIASTOLIC BLOOD PRESSURE: 72 MMHG | TEMPERATURE: 99.2 F | WEIGHT: 136.91 LBS | OXYGEN SATURATION: 93 %

## 2021-01-01 VITALS
TEMPERATURE: 97.6 F | OXYGEN SATURATION: 93 % | HEART RATE: 115 BPM | SYSTOLIC BLOOD PRESSURE: 110 MMHG | RESPIRATION RATE: 18 BRPM | DIASTOLIC BLOOD PRESSURE: 62 MMHG

## 2021-01-01 VITALS
BODY MASS INDEX: 24.26 KG/M2 | DIASTOLIC BLOOD PRESSURE: 75 MMHG | HEART RATE: 80 BPM | SYSTOLIC BLOOD PRESSURE: 154 MMHG | WEIGHT: 136.91 LBS | HEIGHT: 63 IN | TEMPERATURE: 98.4 F

## 2021-01-01 VITALS
RESPIRATION RATE: 22 BRPM | HEART RATE: 103 BPM | OXYGEN SATURATION: 93 % | DIASTOLIC BLOOD PRESSURE: 80 MMHG | SYSTOLIC BLOOD PRESSURE: 120 MMHG

## 2021-01-01 VITALS
BODY MASS INDEX: 27.81 KG/M2 | RESPIRATION RATE: 17 BRPM | HEIGHT: 63 IN | SYSTOLIC BLOOD PRESSURE: 115 MMHG | DIASTOLIC BLOOD PRESSURE: 52 MMHG | OXYGEN SATURATION: 95 % | WEIGHT: 156.97 LBS | HEART RATE: 86 BPM | TEMPERATURE: 98 F

## 2021-01-01 VITALS
RESPIRATION RATE: 20 BRPM | WEIGHT: 125 LBS | HEART RATE: 84 BPM | BODY MASS INDEX: 22.15 KG/M2 | HEIGHT: 63 IN | TEMPERATURE: 98.9 F | OXYGEN SATURATION: 91 % | SYSTOLIC BLOOD PRESSURE: 100 MMHG | DIASTOLIC BLOOD PRESSURE: 63 MMHG

## 2021-01-01 VITALS
RESPIRATION RATE: 16 BRPM | SYSTOLIC BLOOD PRESSURE: 140 MMHG | OXYGEN SATURATION: 91 % | HEART RATE: 89 BPM | DIASTOLIC BLOOD PRESSURE: 70 MMHG

## 2021-01-01 VITALS — TEMPERATURE: 97.9 F | RESPIRATION RATE: 14 BRPM | HEART RATE: 122 BPM

## 2021-01-01 VITALS
TEMPERATURE: 98.1 F | OXYGEN SATURATION: 92 % | RESPIRATION RATE: 14 BRPM | DIASTOLIC BLOOD PRESSURE: 72 MMHG | SYSTOLIC BLOOD PRESSURE: 128 MMHG | HEART RATE: 80 BPM

## 2021-01-01 VITALS — HEIGHT: 63 IN | WEIGHT: 135 LBS | BODY MASS INDEX: 23.92 KG/M2

## 2021-01-01 VITALS
RESPIRATION RATE: 24 BRPM | DIASTOLIC BLOOD PRESSURE: 70 MMHG | OXYGEN SATURATION: 92 % | SYSTOLIC BLOOD PRESSURE: 122 MMHG | HEART RATE: 108 BPM

## 2021-01-01 DIAGNOSIS — T78.2XXA ANAPHYLAXIS, INITIAL ENCOUNTER: Primary | ICD-10-CM

## 2021-01-01 DIAGNOSIS — I26.09 OTHER ACUTE PULMONARY EMBOLISM WITH ACUTE COR PULMONALE (HCC): Primary | ICD-10-CM

## 2021-01-01 DIAGNOSIS — C50.411 MALIGNANT NEOPLASM OF UPPER-OUTER QUADRANT OF RIGHT FEMALE BREAST (HCC): ICD-10-CM

## 2021-01-01 DIAGNOSIS — D05.91 UNSPECIFIED TYPE OF CARCINOMA IN SITU OF RIGHT BREAST: Primary | ICD-10-CM

## 2021-01-01 DIAGNOSIS — Z09 POSTOPERATIVE EXAMINATION: Primary | ICD-10-CM

## 2021-01-01 DIAGNOSIS — R59.1 LYMPHADENOPATHY: Primary | ICD-10-CM

## 2021-01-01 DIAGNOSIS — R29.810 FACIAL DROOP: ICD-10-CM

## 2021-01-01 DIAGNOSIS — D05.91 UNSPECIFIED TYPE OF CARCINOMA IN SITU OF RIGHT BREAST: ICD-10-CM

## 2021-01-01 DIAGNOSIS — I95.89 HYPOTENSION DUE TO HYPOVOLEMIA: ICD-10-CM

## 2021-01-01 DIAGNOSIS — R59.0 AXILLARY ADENOPATHY: ICD-10-CM

## 2021-01-01 DIAGNOSIS — C50.919 MALIGNANT NEOPLASM OF FEMALE BREAST, UNSPECIFIED ESTROGEN RECEPTOR STATUS, UNSPECIFIED LATERALITY, UNSPECIFIED SITE OF BREAST (HCC): ICD-10-CM

## 2021-01-01 DIAGNOSIS — D05.91 CARCINOMA IN SITU OF RIGHT BREAST: Primary | ICD-10-CM

## 2021-01-01 DIAGNOSIS — R59.0 AXILLARY ADENOPATHY: Primary | ICD-10-CM

## 2021-01-01 DIAGNOSIS — C43.59 MALIGNANT MELANOMA OF SKIN OF TRUNK, EXCEPT SCROTUM (HCC): ICD-10-CM

## 2021-01-01 DIAGNOSIS — C43.9 MELANOMA (HCC): Primary | ICD-10-CM

## 2021-01-01 DIAGNOSIS — E86.1 HYPOTENSION DUE TO HYPOVOLEMIA: ICD-10-CM

## 2021-01-01 LAB
ALBUMIN SERPL-MCNC: 2 G/DL (ref 3.5–5)
ALBUMIN SERPL-MCNC: 2.3 G/DL (ref 3.5–5)
ALBUMIN SERPL-MCNC: 2.7 G/DL (ref 3.5–5)
ALBUMIN SERPL-MCNC: 2.8 G/DL (ref 3.5–5)
ALBUMIN SERPL-MCNC: 2.8 G/DL (ref 3.5–5)
ALBUMIN SERPL-MCNC: 2.9 G/DL (ref 3.5–5)
ALBUMIN/GLOB SERPL: 0.5 {RATIO} (ref 1.1–2.2)
ALBUMIN/GLOB SERPL: 0.5 {RATIO} (ref 1.1–2.2)
ALBUMIN/GLOB SERPL: 0.6 {RATIO} (ref 1.1–2.2)
ALBUMIN/GLOB SERPL: 0.8 {RATIO} (ref 1.1–2.2)
ALP SERPL-CCNC: 57 U/L (ref 45–117)
ALP SERPL-CCNC: 60 U/L (ref 45–117)
ALP SERPL-CCNC: 61 U/L (ref 45–117)
ALP SERPL-CCNC: 64 U/L (ref 45–117)
ALP SERPL-CCNC: 66 U/L (ref 45–117)
ALP SERPL-CCNC: 73 U/L (ref 45–117)
ALT SERPL-CCNC: 23 U/L (ref 12–78)
ALT SERPL-CCNC: 24 U/L (ref 12–78)
ALT SERPL-CCNC: 24 U/L (ref 12–78)
ALT SERPL-CCNC: 30 U/L (ref 12–78)
ANION GAP SERPL CALC-SCNC: 2 MMOL/L (ref 5–15)
ANION GAP SERPL CALC-SCNC: 3 MMOL/L (ref 5–15)
ANION GAP SERPL CALC-SCNC: 4 MMOL/L (ref 5–15)
ANION GAP SERPL CALC-SCNC: 4 MMOL/L (ref 5–15)
ANION GAP SERPL CALC-SCNC: 5 MMOL/L (ref 5–15)
ANION GAP SERPL CALC-SCNC: 6 MMOL/L (ref 5–15)
ANION GAP SERPL CALC-SCNC: 7 MMOL/L (ref 5–15)
APPEARANCE UR: CLEAR
AST SERPL-CCNC: 18 U/L (ref 15–37)
AST SERPL-CCNC: 19 U/L (ref 15–37)
AST SERPL-CCNC: 27 U/L (ref 15–37)
AST SERPL-CCNC: 35 U/L (ref 15–37)
AST SERPL-CCNC: 40 U/L (ref 15–37)
AST SERPL-CCNC: 43 U/L (ref 15–37)
ATRIAL RATE: 103 BPM
ATRIAL RATE: 71 BPM
ATRIAL RATE: 75 BPM
ATRIAL RATE: 89 BPM
ATRIAL RATE: 94 BPM
AV R PG: 22.66 MMHG
BACTERIA SPEC CULT: NORMAL
BACTERIA URNS QL MICRO: ABNORMAL /HPF
BACTERIA URNS QL MICRO: NEGATIVE /HPF
BACTERIA URNS QL MICRO: NEGATIVE /HPF
BASOPHILS # BLD: 0 K/UL (ref 0–0.1)
BASOPHILS NFR BLD: 0 % (ref 0–1)
BILIRUB SERPL-MCNC: 0.3 MG/DL (ref 0.2–1)
BILIRUB SERPL-MCNC: 0.4 MG/DL (ref 0.2–1)
BILIRUB SERPL-MCNC: 0.4 MG/DL (ref 0.2–1)
BILIRUB SERPL-MCNC: 0.5 MG/DL (ref 0.2–1)
BILIRUB SERPL-MCNC: 0.5 MG/DL (ref 0.2–1)
BILIRUB SERPL-MCNC: 0.6 MG/DL (ref 0.2–1)
BILIRUB UR QL: NEGATIVE
BNP SERPL-MCNC: 313 PG/ML
BUN SERPL-MCNC: 10 MG/DL (ref 6–20)
BUN SERPL-MCNC: 11 MG/DL (ref 6–20)
BUN SERPL-MCNC: 12 MG/DL (ref 6–20)
BUN SERPL-MCNC: 12 MG/DL (ref 6–20)
BUN SERPL-MCNC: 13 MG/DL (ref 6–20)
BUN SERPL-MCNC: 15 MG/DL (ref 6–20)
BUN SERPL-MCNC: 16 MG/DL (ref 6–20)
BUN SERPL-MCNC: 16 MG/DL (ref 6–20)
BUN SERPL-MCNC: 17 MG/DL (ref 6–20)
BUN SERPL-MCNC: 18 MG/DL (ref 6–20)
BUN SERPL-MCNC: 20 MG/DL (ref 6–20)
BUN SERPL-MCNC: 21 MG/DL (ref 6–20)
BUN SERPL-MCNC: 26 MG/DL (ref 6–20)
BUN/CREAT SERPL: 16 (ref 12–20)
BUN/CREAT SERPL: 16 (ref 12–20)
BUN/CREAT SERPL: 18 (ref 12–20)
BUN/CREAT SERPL: 18 (ref 12–20)
BUN/CREAT SERPL: 19 (ref 12–20)
BUN/CREAT SERPL: 20 (ref 12–20)
BUN/CREAT SERPL: 21 (ref 12–20)
BUN/CREAT SERPL: 22 (ref 12–20)
BUN/CREAT SERPL: 23 (ref 12–20)
BUN/CREAT SERPL: 26 (ref 12–20)
BUN/CREAT SERPL: 26 (ref 12–20)
BUN/CREAT SERPL: 27 (ref 12–20)
BUN/CREAT SERPL: 30 (ref 12–20)
CALCIUM SERPL-MCNC: 8.1 MG/DL (ref 8.5–10.1)
CALCIUM SERPL-MCNC: 8.4 MG/DL (ref 8.5–10.1)
CALCIUM SERPL-MCNC: 8.5 MG/DL (ref 8.5–10.1)
CALCIUM SERPL-MCNC: 8.6 MG/DL (ref 8.5–10.1)
CALCIUM SERPL-MCNC: 8.7 MG/DL (ref 8.5–10.1)
CALCIUM SERPL-MCNC: 8.8 MG/DL (ref 8.5–10.1)
CALCIUM SERPL-MCNC: 8.9 MG/DL (ref 8.5–10.1)
CALCIUM SERPL-MCNC: 9 MG/DL (ref 8.5–10.1)
CALCIUM SERPL-MCNC: 9.2 MG/DL (ref 8.5–10.1)
CALCIUM SERPL-MCNC: 9.8 MG/DL (ref 8.5–10.1)
CALCULATED P AXIS, ECG09: 104 DEGREES
CALCULATED P AXIS, ECG09: 43 DEGREES
CALCULATED P AXIS, ECG09: 57 DEGREES
CALCULATED P AXIS, ECG09: 72 DEGREES
CALCULATED P AXIS, ECG09: 75 DEGREES
CALCULATED R AXIS, ECG10: -30 DEGREES
CALCULATED R AXIS, ECG10: -33 DEGREES
CALCULATED R AXIS, ECG10: -37 DEGREES
CALCULATED R AXIS, ECG10: -38 DEGREES
CALCULATED R AXIS, ECG10: -66 DEGREES
CALCULATED T AXIS, ECG11: 100 DEGREES
CALCULATED T AXIS, ECG11: 116 DEGREES
CALCULATED T AXIS, ECG11: 76 DEGREES
CALCULATED T AXIS, ECG11: 98 DEGREES
CALCULATED T AXIS, ECG11: 98 DEGREES
CHLORIDE SERPL-SCNC: 101 MMOL/L (ref 97–108)
CHLORIDE SERPL-SCNC: 102 MMOL/L (ref 97–108)
CHLORIDE SERPL-SCNC: 103 MMOL/L (ref 97–108)
CHLORIDE SERPL-SCNC: 104 MMOL/L (ref 97–108)
CHLORIDE SERPL-SCNC: 106 MMOL/L (ref 97–108)
CHLORIDE SERPL-SCNC: 107 MMOL/L (ref 97–108)
CHLORIDE SERPL-SCNC: 96 MMOL/L (ref 97–108)
CHLORIDE SERPL-SCNC: 97 MMOL/L (ref 97–108)
CHLORIDE SERPL-SCNC: 97 MMOL/L (ref 97–108)
CHLORIDE SERPL-SCNC: 98 MMOL/L (ref 97–108)
CHLORIDE SERPL-SCNC: 99 MMOL/L (ref 97–108)
CO2 SERPL-SCNC: 25 MMOL/L (ref 21–32)
CO2 SERPL-SCNC: 26 MMOL/L (ref 21–32)
CO2 SERPL-SCNC: 26 MMOL/L (ref 21–32)
CO2 SERPL-SCNC: 27 MMOL/L (ref 21–32)
CO2 SERPL-SCNC: 27 MMOL/L (ref 21–32)
CO2 SERPL-SCNC: 28 MMOL/L (ref 21–32)
CO2 SERPL-SCNC: 28 MMOL/L (ref 21–32)
CO2 SERPL-SCNC: 29 MMOL/L (ref 21–32)
CO2 SERPL-SCNC: 29 MMOL/L (ref 21–32)
CO2 SERPL-SCNC: 30 MMOL/L (ref 21–32)
CO2 SERPL-SCNC: 31 MMOL/L (ref 21–32)
CO2 SERPL-SCNC: 32 MMOL/L (ref 21–32)
COLOR UR: ABNORMAL
COLOR UR: ABNORMAL
COLOR UR: NORMAL
COMMENT, HOLDF: NORMAL
COMMENT, HOLDF: NORMAL
COVID-19 RAPID TEST, COVR: NOT DETECTED
CREAT SERPL-MCNC: 0.56 MG/DL (ref 0.55–1.02)
CREAT SERPL-MCNC: 0.62 MG/DL (ref 0.55–1.02)
CREAT SERPL-MCNC: 0.65 MG/DL (ref 0.55–1.02)
CREAT SERPL-MCNC: 0.74 MG/DL (ref 0.55–1.02)
CREAT SERPL-MCNC: 0.74 MG/DL (ref 0.55–1.02)
CREAT SERPL-MCNC: 0.76 MG/DL (ref 0.55–1.02)
CREAT SERPL-MCNC: 0.78 MG/DL (ref 0.55–1.02)
CREAT SERPL-MCNC: 0.78 MG/DL (ref 0.55–1.02)
CREAT SERPL-MCNC: 0.81 MG/DL (ref 0.55–1.02)
CREAT SERPL-MCNC: 0.88 MG/DL (ref 0.55–1.02)
CREAT SERPL-MCNC: 0.88 MG/DL (ref 0.55–1.02)
CREAT SERPL-MCNC: 0.98 MG/DL (ref 0.55–1.02)
CREAT SERPL-MCNC: 1.02 MG/DL (ref 0.55–1.02)
DIAGNOSIS, 93000: NORMAL
DIFFERENTIAL METHOD BLD: ABNORMAL
ECHO AR MAX VEL PISA: 238.04 CM/S
ECHO AV REGURGITANT PHT: 746.81 MS
ECHO EST RA PRESSURE: 10 MMHG
ECHO LA TO AORTIC ROOT RATIO: 1.06
ECHO LV E' LATERAL VELOCITY: 3.59 CM/S
ECHO LV E' SEPTAL VELOCITY: 4.12 CM/S
ECHO LV INTERNAL DIMENSION DIASTOLIC: 3.82 CM (ref 3.9–5.3)
ECHO LV INTERNAL DIMENSION SYSTOLIC: 2.58 CM
ECHO LV IVSD: 1.16 CM (ref 0.6–0.9)
ECHO LV MASS 2D: 150.6 G (ref 67–162)
ECHO LV MASS INDEX 2D: 86.6 G/M2 (ref 43–95)
ECHO LV POSTERIOR WALL DIASTOLIC: 1.2 CM (ref 0.6–0.9)
ECHO LVOT DIAM: 1.72 CM
ECHO LVOT PEAK GRADIENT: 8.05 MMHG
ECHO LVOT PEAK VELOCITY: 141.83 CM/S
ECHO MV A VELOCITY: 89.81 CM/S
ECHO MV E DECELERATION TIME (DT): 167.6 MS
ECHO MV E VELOCITY: 49.41 CM/S
ECHO MV E/A RATIO: 0.55
ECHO MV E/E' LATERAL: 13.76
ECHO MV E/E' RATIO (AVERAGED): 12.88
ECHO MV E/E' SEPTAL: 11.99
ECHO PV PEAK INSTANTANEOUS GRADIENT SYSTOLIC: 4.02 MMHG
ECHO PV REGURGITANT MAX VELOCITY: 100.29 CM/S
ECHO RIGHT VENTRICULAR SYSTOLIC PRESSURE (RVSP): 17.84 MMHG
ECHO RV INTERNAL DIMENSION: 2.72 CM
ECHO TV REGURGITANT MAX VELOCITY: 139.96 CM/S
ECHO TV REGURGITANT MAX VELOCITY: 150.93 CM/S
ECHO TV REGURGITANT PEAK GRADIENT: 7.84 MMHG
EOSINOPHIL # BLD: 0 K/UL (ref 0–0.4)
EOSINOPHIL # BLD: 0 K/UL (ref 0–0.4)
EOSINOPHIL # BLD: 0.1 K/UL (ref 0–0.4)
EOSINOPHIL # BLD: 0.2 K/UL (ref 0–0.4)
EOSINOPHIL # BLD: 0.4 K/UL (ref 0–0.4)
EOSINOPHIL NFR BLD: 0 % (ref 0–7)
EOSINOPHIL NFR BLD: 0 % (ref 0–7)
EOSINOPHIL NFR BLD: 1 % (ref 0–7)
EOSINOPHIL NFR BLD: 3 % (ref 0–7)
EOSINOPHIL NFR BLD: 4 % (ref 0–7)
EOSINOPHIL NFR BLD: 6 % (ref 0–7)
EPITH CASTS URNS QL MICRO: ABNORMAL /LPF
EPITH CASTS URNS QL MICRO: ABNORMAL /LPF
EPITH CASTS URNS QL MICRO: NORMAL /LPF
ERYTHROCYTE [DISTWIDTH] IN BLOOD BY AUTOMATED COUNT: 12.4 % (ref 11.5–14.5)
ERYTHROCYTE [DISTWIDTH] IN BLOOD BY AUTOMATED COUNT: 12.6 % (ref 11.5–14.5)
ERYTHROCYTE [DISTWIDTH] IN BLOOD BY AUTOMATED COUNT: 12.7 % (ref 11.5–14.5)
ERYTHROCYTE [DISTWIDTH] IN BLOOD BY AUTOMATED COUNT: 12.9 % (ref 11.5–14.5)
ERYTHROCYTE [DISTWIDTH] IN BLOOD BY AUTOMATED COUNT: 13 % (ref 11.5–14.5)
ERYTHROCYTE [DISTWIDTH] IN BLOOD BY AUTOMATED COUNT: 13.1 % (ref 11.5–14.5)
ERYTHROCYTE [DISTWIDTH] IN BLOOD BY AUTOMATED COUNT: 13.2 % (ref 11.5–14.5)
ERYTHROCYTE [DISTWIDTH] IN BLOOD BY AUTOMATED COUNT: 13.2 % (ref 11.5–14.5)
ERYTHROCYTE [DISTWIDTH] IN BLOOD BY AUTOMATED COUNT: 13.3 % (ref 11.5–14.5)
ERYTHROCYTE [DISTWIDTH] IN BLOOD BY AUTOMATED COUNT: 13.4 % (ref 11.5–14.5)
ERYTHROCYTE [DISTWIDTH] IN BLOOD BY AUTOMATED COUNT: 13.4 % (ref 11.5–14.5)
GLOBULIN SER CALC-MCNC: 3.4 G/DL (ref 2–4)
GLOBULIN SER CALC-MCNC: 3.5 G/DL (ref 2–4)
GLOBULIN SER CALC-MCNC: 3.7 G/DL (ref 2–4)
GLOBULIN SER CALC-MCNC: 4.2 G/DL (ref 2–4)
GLOBULIN SER CALC-MCNC: 4.4 G/DL (ref 2–4)
GLOBULIN SER CALC-MCNC: 4.9 G/DL (ref 2–4)
GLUCOSE BLD STRIP.AUTO-MCNC: 109 MG/DL (ref 65–100)
GLUCOSE BLD STRIP.AUTO-MCNC: 158 MG/DL (ref 65–117)
GLUCOSE BLD STRIP.AUTO-MCNC: 171 MG/DL (ref 65–117)
GLUCOSE SERPL-MCNC: 101 MG/DL (ref 65–100)
GLUCOSE SERPL-MCNC: 105 MG/DL (ref 65–100)
GLUCOSE SERPL-MCNC: 106 MG/DL (ref 65–100)
GLUCOSE SERPL-MCNC: 112 MG/DL (ref 65–100)
GLUCOSE SERPL-MCNC: 112 MG/DL (ref 65–100)
GLUCOSE SERPL-MCNC: 113 MG/DL (ref 65–100)
GLUCOSE SERPL-MCNC: 116 MG/DL (ref 65–100)
GLUCOSE SERPL-MCNC: 119 MG/DL (ref 65–100)
GLUCOSE SERPL-MCNC: 120 MG/DL (ref 65–100)
GLUCOSE SERPL-MCNC: 152 MG/DL (ref 65–100)
GLUCOSE SERPL-MCNC: 154 MG/DL (ref 65–100)
GLUCOSE SERPL-MCNC: 159 MG/DL (ref 65–100)
GLUCOSE SERPL-MCNC: 162 MG/DL (ref 65–100)
GLUCOSE SERPL-MCNC: 167 MG/DL (ref 65–100)
GLUCOSE SERPL-MCNC: 98 MG/DL (ref 65–100)
GLUCOSE UR STRIP.AUTO-MCNC: NEGATIVE MG/DL
GRAM STN SPEC: NORMAL
GRAM STN SPEC: NORMAL
HCT VFR BLD AUTO: 24.2 % (ref 35–47)
HCT VFR BLD AUTO: 25.2 % (ref 35–47)
HCT VFR BLD AUTO: 26.2 % (ref 35–47)
HCT VFR BLD AUTO: 27.4 % (ref 35–47)
HCT VFR BLD AUTO: 27.6 % (ref 35–47)
HCT VFR BLD AUTO: 29.2 % (ref 35–47)
HCT VFR BLD AUTO: 29.8 % (ref 35–47)
HCT VFR BLD AUTO: 31.9 % (ref 35–47)
HCT VFR BLD AUTO: 32.4 % (ref 35–47)
HCT VFR BLD AUTO: 32.6 % (ref 35–47)
HCT VFR BLD AUTO: 33.2 % (ref 35–47)
HCT VFR BLD AUTO: 34 % (ref 35–47)
HCT VFR BLD AUTO: 34.5 % (ref 35–47)
HGB BLD-MCNC: 10.7 G/DL (ref 11.5–16)
HGB BLD-MCNC: 10.9 G/DL (ref 11.5–16)
HGB BLD-MCNC: 11 G/DL (ref 11.5–16)
HGB BLD-MCNC: 11.1 G/DL (ref 11.5–16)
HGB BLD-MCNC: 11.4 G/DL (ref 11.5–16)
HGB BLD-MCNC: 11.7 G/DL (ref 11.5–16)
HGB BLD-MCNC: 8.2 G/DL (ref 11.5–16)
HGB BLD-MCNC: 8.4 G/DL (ref 11.5–16)
HGB BLD-MCNC: 8.8 G/DL (ref 11.5–16)
HGB BLD-MCNC: 8.9 G/DL (ref 11.5–16)
HGB BLD-MCNC: 9.1 G/DL (ref 11.5–16)
HGB BLD-MCNC: 9.4 G/DL (ref 11.5–16)
HGB BLD-MCNC: 9.8 G/DL (ref 11.5–16)
HGB UR QL STRIP: NEGATIVE
HYALINE CASTS URNS QL MICRO: ABNORMAL /LPF (ref 0–5)
HYALINE CASTS URNS QL MICRO: ABNORMAL /LPF (ref 0–5)
HYALINE CASTS URNS QL MICRO: NORMAL /LPF (ref 0–5)
IMM GRANULOCYTES # BLD AUTO: 0 K/UL (ref 0–0.04)
IMM GRANULOCYTES # BLD AUTO: 0.1 K/UL (ref 0–0.04)
IMM GRANULOCYTES # BLD AUTO: 0.1 K/UL (ref 0–0.04)
IMM GRANULOCYTES NFR BLD AUTO: 0 % (ref 0–0.5)
IMM GRANULOCYTES NFR BLD AUTO: 1 % (ref 0–0.5)
INR PPP: 1 (ref 0.9–1.1)
KETONES UR QL STRIP.AUTO: NEGATIVE MG/DL
LACTATE BLD-SCNC: 2.59 MMOL/L (ref 0.4–2)
LACTATE BLD-SCNC: 2.98 MMOL/L (ref 0.4–2)
LACTATE SERPL-SCNC: 0.9 MMOL/L (ref 0.4–2)
LACTATE SERPL-SCNC: 1.9 MMOL/L (ref 0.4–2)
LEUKOCYTE ESTERASE UR QL STRIP.AUTO: ABNORMAL
LEUKOCYTE ESTERASE UR QL STRIP.AUTO: NEGATIVE
LEUKOCYTE ESTERASE UR QL STRIP.AUTO: NEGATIVE
LIPASE SERPL-CCNC: 315 U/L (ref 73–393)
LYMPHOCYTES # BLD: 0.7 K/UL (ref 0.8–3.5)
LYMPHOCYTES # BLD: 0.8 K/UL (ref 0.8–3.5)
LYMPHOCYTES # BLD: 0.9 K/UL (ref 0.8–3.5)
LYMPHOCYTES # BLD: 1.1 K/UL (ref 0.8–3.5)
LYMPHOCYTES # BLD: 1.2 K/UL (ref 0.8–3.5)
LYMPHOCYTES # BLD: 1.3 K/UL (ref 0.8–3.5)
LYMPHOCYTES # BLD: 1.3 K/UL (ref 0.8–3.5)
LYMPHOCYTES # BLD: 1.5 K/UL (ref 0.8–3.5)
LYMPHOCYTES # BLD: 1.6 K/UL (ref 0.8–3.5)
LYMPHOCYTES # BLD: 2.9 K/UL (ref 0.8–3.5)
LYMPHOCYTES NFR BLD: 11 % (ref 12–49)
LYMPHOCYTES NFR BLD: 12 % (ref 12–49)
LYMPHOCYTES NFR BLD: 12 % (ref 12–49)
LYMPHOCYTES NFR BLD: 17 % (ref 12–49)
LYMPHOCYTES NFR BLD: 18 % (ref 12–49)
LYMPHOCYTES NFR BLD: 20 % (ref 12–49)
LYMPHOCYTES NFR BLD: 23 % (ref 12–49)
LYMPHOCYTES NFR BLD: 30 % (ref 12–49)
LYMPHOCYTES NFR BLD: 42 % (ref 12–49)
LYMPHOCYTES NFR BLD: 8 % (ref 12–49)
MCH RBC QN AUTO: 29.9 PG (ref 26–34)
MCH RBC QN AUTO: 30.2 PG (ref 26–34)
MCH RBC QN AUTO: 30.2 PG (ref 26–34)
MCH RBC QN AUTO: 30.5 PG (ref 26–34)
MCH RBC QN AUTO: 30.7 PG (ref 26–34)
MCH RBC QN AUTO: 30.7 PG (ref 26–34)
MCH RBC QN AUTO: 30.8 PG (ref 26–34)
MCH RBC QN AUTO: 31 PG (ref 26–34)
MCH RBC QN AUTO: 31.5 PG (ref 26–34)
MCH RBC QN AUTO: 31.7 PG (ref 26–34)
MCH RBC QN AUTO: 31.8 PG (ref 26–34)
MCH RBC QN AUTO: 31.9 PG (ref 26–34)
MCH RBC QN AUTO: 31.9 PG (ref 26–34)
MCHC RBC AUTO-ENTMCNC: 31.9 G/DL (ref 30–36.5)
MCHC RBC AUTO-ENTMCNC: 32.2 G/DL (ref 30–36.5)
MCHC RBC AUTO-ENTMCNC: 32.8 G/DL (ref 30–36.5)
MCHC RBC AUTO-ENTMCNC: 32.9 G/DL (ref 30–36.5)
MCHC RBC AUTO-ENTMCNC: 33.2 G/DL (ref 30–36.5)
MCHC RBC AUTO-ENTMCNC: 33.3 G/DL (ref 30–36.5)
MCHC RBC AUTO-ENTMCNC: 33.4 G/DL (ref 30–36.5)
MCHC RBC AUTO-ENTMCNC: 33.5 G/DL (ref 30–36.5)
MCHC RBC AUTO-ENTMCNC: 33.6 G/DL (ref 30–36.5)
MCHC RBC AUTO-ENTMCNC: 33.9 G/DL (ref 30–36.5)
MCHC RBC AUTO-ENTMCNC: 33.9 G/DL (ref 30–36.5)
MCHC RBC AUTO-ENTMCNC: 34 G/DL (ref 30–36.5)
MCHC RBC AUTO-ENTMCNC: 34.5 G/DL (ref 30–36.5)
MCV RBC AUTO: 90.3 FL (ref 80–99)
MCV RBC AUTO: 90.6 FL (ref 80–99)
MCV RBC AUTO: 91 FL (ref 80–99)
MCV RBC AUTO: 92.3 FL (ref 80–99)
MCV RBC AUTO: 92.5 FL (ref 80–99)
MCV RBC AUTO: 92.8 FL (ref 80–99)
MCV RBC AUTO: 93.9 FL (ref 80–99)
MCV RBC AUTO: 94 FL (ref 80–99)
MCV RBC AUTO: 94.5 FL (ref 80–99)
MCV RBC AUTO: 94.5 FL (ref 80–99)
MCV RBC AUTO: 94.9 FL (ref 80–99)
MONOCYTES # BLD: 0.6 K/UL (ref 0–1)
MONOCYTES # BLD: 0.6 K/UL (ref 0–1)
MONOCYTES # BLD: 0.7 K/UL (ref 0–1)
MONOCYTES # BLD: 0.8 K/UL (ref 0–1)
MONOCYTES # BLD: 0.9 K/UL (ref 0–1)
MONOCYTES NFR BLD: 10 % (ref 5–13)
MONOCYTES NFR BLD: 11 % (ref 5–13)
MONOCYTES NFR BLD: 12 % (ref 5–13)
MONOCYTES NFR BLD: 14 % (ref 5–13)
MONOCYTES NFR BLD: 15 % (ref 5–13)
MONOCYTES NFR BLD: 9 % (ref 5–13)
NEUTS SEG # BLD: 2.3 K/UL (ref 1.8–8)
NEUTS SEG # BLD: 3.1 K/UL (ref 1.8–8)
NEUTS SEG # BLD: 3.4 K/UL (ref 1.8–8)
NEUTS SEG # BLD: 3.8 K/UL (ref 1.8–8)
NEUTS SEG # BLD: 4.7 K/UL (ref 1.8–8)
NEUTS SEG # BLD: 5 K/UL (ref 1.8–8)
NEUTS SEG # BLD: 6.3 K/UL (ref 1.8–8)
NEUTS SEG # BLD: 6.5 K/UL (ref 1.8–8)
NEUTS SEG # BLD: 7.1 K/UL (ref 1.8–8)
NEUTS SEG # BLD: 7.4 K/UL (ref 1.8–8)
NEUTS SEG NFR BLD: 44 % (ref 32–75)
NEUTS SEG NFR BLD: 52 % (ref 32–75)
NEUTS SEG NFR BLD: 55 % (ref 32–75)
NEUTS SEG NFR BLD: 65 % (ref 32–75)
NEUTS SEG NFR BLD: 70 % (ref 32–75)
NEUTS SEG NFR BLD: 71 % (ref 32–75)
NEUTS SEG NFR BLD: 74 % (ref 32–75)
NEUTS SEG NFR BLD: 78 % (ref 32–75)
NEUTS SEG NFR BLD: 78 % (ref 32–75)
NEUTS SEG NFR BLD: 82 % (ref 32–75)
NITRITE UR QL STRIP.AUTO: NEGATIVE
NRBC # BLD: 0 K/UL (ref 0–0.01)
NRBC BLD-RTO: 0 PER 100 WBC
P-R INTERVAL, ECG05: 148 MS
P-R INTERVAL, ECG05: 162 MS
P-R INTERVAL, ECG05: 172 MS
P-R INTERVAL, ECG05: 174 MS
P-R INTERVAL, ECG05: 176 MS
PH UR STRIP: 5 [PH] (ref 5–8)
PH UR STRIP: 5.5 [PH] (ref 5–8)
PH UR STRIP: 6.5 [PH] (ref 5–8)
PLATELET # BLD AUTO: 208 K/UL (ref 150–400)
PLATELET # BLD AUTO: 213 K/UL (ref 150–400)
PLATELET # BLD AUTO: 223 K/UL (ref 150–400)
PLATELET # BLD AUTO: 226 K/UL (ref 150–400)
PLATELET # BLD AUTO: 254 K/UL (ref 150–400)
PLATELET # BLD AUTO: 276 K/UL (ref 150–400)
PLATELET # BLD AUTO: 298 K/UL (ref 150–400)
PLATELET # BLD AUTO: 306 K/UL (ref 150–400)
PLATELET # BLD AUTO: 363 K/UL (ref 150–400)
PLATELET # BLD AUTO: 365 K/UL (ref 150–400)
PLATELET # BLD AUTO: 429 K/UL (ref 150–400)
PLATELET # BLD AUTO: 446 K/UL (ref 150–400)
PLATELET # BLD AUTO: 463 K/UL (ref 150–400)
PLATELET COMMENTS,PCOM: ABNORMAL
PMV BLD AUTO: 10.5 FL (ref 8.9–12.9)
PMV BLD AUTO: 10.6 FL (ref 8.9–12.9)
PMV BLD AUTO: 10.7 FL (ref 8.9–12.9)
PMV BLD AUTO: 10.8 FL (ref 8.9–12.9)
PMV BLD AUTO: 10.9 FL (ref 8.9–12.9)
PMV BLD AUTO: 11.3 FL (ref 8.9–12.9)
PMV BLD AUTO: 9.2 FL (ref 8.9–12.9)
PMV BLD AUTO: 9.3 FL (ref 8.9–12.9)
PMV BLD AUTO: 9.4 FL (ref 8.9–12.9)
PMV BLD AUTO: 9.8 FL (ref 8.9–12.9)
PMV BLD AUTO: 9.8 FL (ref 8.9–12.9)
POTASSIUM SERPL-SCNC: 3 MMOL/L (ref 3.5–5.1)
POTASSIUM SERPL-SCNC: 3.7 MMOL/L (ref 3.5–5.1)
POTASSIUM SERPL-SCNC: 3.8 MMOL/L (ref 3.5–5.1)
POTASSIUM SERPL-SCNC: 3.9 MMOL/L (ref 3.5–5.1)
POTASSIUM SERPL-SCNC: 4 MMOL/L (ref 3.5–5.1)
POTASSIUM SERPL-SCNC: 4.1 MMOL/L (ref 3.5–5.1)
POTASSIUM SERPL-SCNC: 4.2 MMOL/L (ref 3.5–5.1)
POTASSIUM SERPL-SCNC: 4.5 MMOL/L (ref 3.5–5.1)
POTASSIUM SERPL-SCNC: 4.7 MMOL/L (ref 3.5–5.1)
PROCALCITONIN SERPL-MCNC: <0.05 NG/ML
PROCALCITONIN SERPL-MCNC: <0.05 NG/ML
PROT SERPL-MCNC: 6.2 G/DL (ref 6.4–8.2)
PROT SERPL-MCNC: 6.3 G/DL (ref 6.4–8.2)
PROT SERPL-MCNC: 6.4 G/DL (ref 6.4–8.2)
PROT SERPL-MCNC: 6.5 G/DL (ref 6.4–8.2)
PROT SERPL-MCNC: 6.6 G/DL (ref 6.4–8.2)
PROT SERPL-MCNC: 7.6 G/DL (ref 6.4–8.2)
PROT UR STRIP-MCNC: ABNORMAL MG/DL
PROT UR STRIP-MCNC: NEGATIVE MG/DL
PROT UR STRIP-MCNC: NEGATIVE MG/DL
PROTHROMBIN TIME: 10.9 SEC (ref 9–11.1)
Q-T INTERVAL, ECG07: 388 MS
Q-T INTERVAL, ECG07: 404 MS
Q-T INTERVAL, ECG07: 406 MS
Q-T INTERVAL, ECG07: 436 MS
Q-T INTERVAL, ECG07: 438 MS
QRS DURATION, ECG06: 140 MS
QRS DURATION, ECG06: 140 MS
QRS DURATION, ECG06: 144 MS
QRS DURATION, ECG06: 152 MS
QRS DURATION, ECG06: 156 MS
QTC CALCULATION (BEZET), ECG08: 473 MS
QTC CALCULATION (BEZET), ECG08: 485 MS
QTC CALCULATION (BEZET), ECG08: 489 MS
QTC CALCULATION (BEZET), ECG08: 493 MS
QTC CALCULATION (BEZET), ECG08: 529 MS
RBC # BLD AUTO: 2.67 M/UL (ref 3.8–5.2)
RBC # BLD AUTO: 2.73 M/UL (ref 3.8–5.2)
RBC # BLD AUTO: 2.9 M/UL (ref 3.8–5.2)
RBC # BLD AUTO: 2.94 M/UL (ref 3.8–5.2)
RBC # BLD AUTO: 3.01 M/UL (ref 3.8–5.2)
RBC # BLD AUTO: 3.11 M/UL (ref 3.8–5.2)
RBC # BLD AUTO: 3.21 M/UL (ref 3.8–5.2)
RBC # BLD AUTO: 3.43 M/UL (ref 3.8–5.2)
RBC # BLD AUTO: 3.45 M/UL (ref 3.8–5.2)
RBC # BLD AUTO: 3.45 M/UL (ref 3.8–5.2)
RBC # BLD AUTO: 3.5 M/UL (ref 3.8–5.2)
RBC # BLD AUTO: 3.62 M/UL (ref 3.8–5.2)
RBC # BLD AUTO: 3.67 M/UL (ref 3.8–5.2)
RBC #/AREA URNS HPF: ABNORMAL /HPF (ref 0–5)
RBC #/AREA URNS HPF: ABNORMAL /HPF (ref 0–5)
RBC #/AREA URNS HPF: NORMAL /HPF (ref 0–5)
RBC MORPH BLD: ABNORMAL
RBC MORPH BLD: ABNORMAL
SAMPLES BEING HELD,HOLD: NORMAL
SAMPLES BEING HELD,HOLD: NORMAL
SARS-COV-2, COV2: NORMAL
SARS-COV-2, XPLCVT: NOT DETECTED
SERVICE CMNT-IMP: ABNORMAL
SERVICE CMNT-IMP: NORMAL
SODIUM SERPL-SCNC: 129 MMOL/L (ref 136–145)
SODIUM SERPL-SCNC: 130 MMOL/L (ref 136–145)
SODIUM SERPL-SCNC: 131 MMOL/L (ref 136–145)
SODIUM SERPL-SCNC: 131 MMOL/L (ref 136–145)
SODIUM SERPL-SCNC: 132 MMOL/L (ref 136–145)
SODIUM SERPL-SCNC: 134 MMOL/L (ref 136–145)
SODIUM SERPL-SCNC: 135 MMOL/L (ref 136–145)
SODIUM SERPL-SCNC: 135 MMOL/L (ref 136–145)
SODIUM SERPL-SCNC: 136 MMOL/L (ref 136–145)
SODIUM SERPL-SCNC: 138 MMOL/L (ref 136–145)
SODIUM SERPL-SCNC: 139 MMOL/L (ref 136–145)
SODIUM SERPL-SCNC: 139 MMOL/L (ref 136–145)
SODIUM SERPL-SCNC: 140 MMOL/L (ref 136–145)
SOURCE, COVRS: NORMAL
SOURCE, COVRS: NORMAL
SP GR UR REFRACTOMETRY: 1.01 (ref 1–1.03)
SP GR UR REFRACTOMETRY: 1.02 (ref 1–1.03)
SP GR UR REFRACTOMETRY: 1.02 (ref 1–1.03)
TROPONIN I SERPL-MCNC: 0.09 NG/ML
TROPONIN I SERPL-MCNC: 0.16 NG/ML
TROPONIN I SERPL-MCNC: <0.05 NG/ML
TROPONIN I SERPL-MCNC: <0.05 NG/ML
UA: UC IF INDICATED,UAUC: ABNORMAL
UA: UC IF INDICATED,UAUC: NORMAL
UROBILINOGEN UR QL STRIP.AUTO: 0.2 EU/DL (ref 0.2–1)
VANCOMYCIN SERPL-MCNC: 15.8 UG/ML
VENTRICULAR RATE, ECG03: 103 BPM
VENTRICULAR RATE, ECG03: 71 BPM
VENTRICULAR RATE, ECG03: 75 BPM
VENTRICULAR RATE, ECG03: 89 BPM
VENTRICULAR RATE, ECG03: 94 BPM
WBC # BLD AUTO: 17.1 K/UL (ref 3.6–11)
WBC # BLD AUTO: 4.5 K/UL (ref 3.6–11)
WBC # BLD AUTO: 5.5 K/UL (ref 3.6–11)
WBC # BLD AUTO: 5.9 K/UL (ref 3.6–11)
WBC # BLD AUTO: 5.9 K/UL (ref 3.6–11)
WBC # BLD AUTO: 6.2 K/UL (ref 3.6–11)
WBC # BLD AUTO: 6.5 K/UL (ref 3.6–11)
WBC # BLD AUTO: 6.8 K/UL (ref 3.6–11)
WBC # BLD AUTO: 7.2 K/UL (ref 3.6–11)
WBC # BLD AUTO: 8.3 K/UL (ref 3.6–11)
WBC # BLD AUTO: 8.9 K/UL (ref 3.6–11)
WBC # BLD AUTO: 9 K/UL (ref 3.6–11)
WBC # BLD AUTO: 9 K/UL (ref 3.6–11)
WBC URNS QL MICRO: ABNORMAL /HPF (ref 0–4)
WBC URNS QL MICRO: ABNORMAL /HPF (ref 0–4)
WBC URNS QL MICRO: NORMAL /HPF (ref 0–4)

## 2021-01-01 PROCEDURE — 80048 BASIC METABOLIC PNL TOTAL CA: CPT

## 2021-01-01 PROCEDURE — G0299 HHS/HOSPICE OF RN EA 15 MIN: HCPCS

## 2021-01-01 PROCEDURE — 84484 ASSAY OF TROPONIN QUANT: CPT

## 2021-01-01 PROCEDURE — 36415 COLL VENOUS BLD VENIPUNCTURE: CPT

## 2021-01-01 PROCEDURE — 88307 TISSUE EXAM BY PATHOLOGIST: CPT

## 2021-01-01 PROCEDURE — 74011250637 HC RX REV CODE- 250/637: Performed by: FAMILY MEDICINE

## 2021-01-01 PROCEDURE — 74011250637 HC RX REV CODE- 250/637: Performed by: INTERNAL MEDICINE

## 2021-01-01 PROCEDURE — 87205 SMEAR GRAM STAIN: CPT

## 2021-01-01 PROCEDURE — 1111F DSCHRG MED/CURRENT MED MERGE: CPT | Performed by: SURGERY

## 2021-01-01 PROCEDURE — 77030034698 HC LIGASURE MRYLND OPN SEAL DIV COVD -F: Performed by: SURGERY

## 2021-01-01 PROCEDURE — 74011250636 HC RX REV CODE- 250/636: Performed by: INTERNAL MEDICINE

## 2021-01-01 PROCEDURE — 65270000029 HC RM PRIVATE

## 2021-01-01 PROCEDURE — 99024 POSTOP FOLLOW-UP VISIT: CPT | Performed by: SURGERY

## 2021-01-01 PROCEDURE — 80053 COMPREHEN METABOLIC PANEL: CPT

## 2021-01-01 PROCEDURE — 85025 COMPLETE CBC W/AUTO DIFF WBC: CPT

## 2021-01-01 PROCEDURE — G8399 PT W/DXA RESULTS DOCUMENT: HCPCS | Performed by: SURGERY

## 2021-01-01 PROCEDURE — 81001 URINALYSIS AUTO W/SCOPE: CPT

## 2021-01-01 PROCEDURE — 65410000002 HC RM PRIVATE OB

## 2021-01-01 PROCEDURE — 93005 ELECTROCARDIOGRAM TRACING: CPT

## 2021-01-01 PROCEDURE — 74019 RADEX ABDOMEN 2 VIEWS: CPT

## 2021-01-01 PROCEDURE — 77030038552 HC DRN WND MDII -A: Performed by: SURGERY

## 2021-01-01 PROCEDURE — 65660000000 HC RM CCU STEPDOWN

## 2021-01-01 PROCEDURE — 99285 EMERGENCY DEPT VISIT HI MDM: CPT

## 2021-01-01 PROCEDURE — 97530 THERAPEUTIC ACTIVITIES: CPT

## 2021-01-01 PROCEDURE — 73560 X-RAY EXAM OF KNEE 1 OR 2: CPT

## 2021-01-01 PROCEDURE — 96372 THER/PROPH/DIAG INJ SC/IM: CPT

## 2021-01-01 PROCEDURE — 74011250636 HC RX REV CODE- 250/636: Performed by: HOSPITALIST

## 2021-01-01 PROCEDURE — 74011250637 HC RX REV CODE- 250/637: Performed by: HOSPITALIST

## 2021-01-01 PROCEDURE — 76060000034 HC ANESTHESIA 1.5 TO 2 HR: Performed by: SURGERY

## 2021-01-01 PROCEDURE — 96361 HYDRATE IV INFUSION ADD-ON: CPT

## 2021-01-01 PROCEDURE — A9552 F18 FDG: HCPCS

## 2021-01-01 PROCEDURE — 94760 N-INVAS EAR/PLS OXIMETRY 1: CPT

## 2021-01-01 PROCEDURE — 88341 IMHCHEM/IMCYTCHM EA ADD ANTB: CPT

## 2021-01-01 PROCEDURE — 74011000258 HC RX REV CODE- 258: Performed by: FAMILY MEDICINE

## 2021-01-01 PROCEDURE — 99213 OFFICE O/P EST LOW 20 MIN: CPT | Performed by: SURGERY

## 2021-01-01 PROCEDURE — 97535 SELF CARE MNGMENT TRAINING: CPT | Performed by: OCCUPATIONAL THERAPIST

## 2021-01-01 PROCEDURE — 76705 ECHO EXAM OF ABDOMEN: CPT

## 2021-01-01 PROCEDURE — G0155 HHCP-SVS OF CSW,EA 15 MIN: HCPCS

## 2021-01-01 PROCEDURE — 82962 GLUCOSE BLOOD TEST: CPT

## 2021-01-01 PROCEDURE — 97110 THERAPEUTIC EXERCISES: CPT

## 2021-01-01 PROCEDURE — 96375 TX/PRO/DX INJ NEW DRUG ADDON: CPT

## 2021-01-01 PROCEDURE — 65390000012 HC CONDITION CODE 44 OBSERVATION

## 2021-01-01 PROCEDURE — 0651 HSPC ROUTINE HOME CARE

## 2021-01-01 PROCEDURE — G8420 CALC BMI NORM PARAMETERS: HCPCS | Performed by: SURGERY

## 2021-01-01 PROCEDURE — 97116 GAIT TRAINING THERAPY: CPT

## 2021-01-01 PROCEDURE — 70553 MRI BRAIN STEM W/O & W/DYE: CPT

## 2021-01-01 PROCEDURE — 96376 TX/PRO/DX INJ SAME DRUG ADON: CPT

## 2021-01-01 PROCEDURE — 74011250637 HC RX REV CODE- 250/637: Performed by: SURGERY

## 2021-01-01 PROCEDURE — 99218 HC RM OBSERVATION: CPT

## 2021-01-01 PROCEDURE — G0156 HHCP-SVS OF AIDE,EA 15 MIN: HCPCS

## 2021-01-01 PROCEDURE — 76210000006 HC OR PH I REC 0.5 TO 1 HR: Performed by: SURGERY

## 2021-01-01 PROCEDURE — 74011250636 HC RX REV CODE- 250/636: Performed by: FAMILY MEDICINE

## 2021-01-01 PROCEDURE — 97530 THERAPEUTIC ACTIVITIES: CPT | Performed by: OCCUPATIONAL THERAPIST

## 2021-01-01 PROCEDURE — 83690 ASSAY OF LIPASE: CPT

## 2021-01-01 PROCEDURE — 74011250637 HC RX REV CODE- 250/637: Performed by: NURSE PRACTITIONER

## 2021-01-01 PROCEDURE — 74011000636 HC RX REV CODE- 636: Performed by: RADIOLOGY

## 2021-01-01 PROCEDURE — 84145 PROCALCITONIN (PCT): CPT

## 2021-01-01 PROCEDURE — P9045 ALBUMIN (HUMAN), 5%, 250 ML: HCPCS | Performed by: NURSE ANESTHETIST, CERTIFIED REGISTERED

## 2021-01-01 PROCEDURE — 83605 ASSAY OF LACTIC ACID: CPT

## 2021-01-01 PROCEDURE — 88300 SURGICAL PATH GROSS: CPT

## 2021-01-01 PROCEDURE — 74011250636 HC RX REV CODE- 250/636: Performed by: NURSE PRACTITIONER

## 2021-01-01 PROCEDURE — G8432 DEP SCR NOT DOC, RNG: HCPCS | Performed by: SURGERY

## 2021-01-01 PROCEDURE — A9576 INJ PROHANCE MULTIPACK: HCPCS | Performed by: EMERGENCY MEDICINE

## 2021-01-01 PROCEDURE — HOSPICE MEDICATION HC HH HOSPICE MEDICATION

## 2021-01-01 PROCEDURE — 19328 RMVL INTACT BREAST IMPLANT: CPT | Performed by: SURGERY

## 2021-01-01 PROCEDURE — 97535 SELF CARE MNGMENT TRAINING: CPT

## 2021-01-01 PROCEDURE — 77030002933 HC SUT MCRYL J&J -A: Performed by: SURGERY

## 2021-01-01 PROCEDURE — 80202 ASSAY OF VANCOMYCIN: CPT

## 2021-01-01 PROCEDURE — 99282 EMERGENCY DEPT VISIT SF MDM: CPT

## 2021-01-01 PROCEDURE — 87635 SARS-COV-2 COVID-19 AMP PRB: CPT

## 2021-01-01 PROCEDURE — 74011000250 HC RX REV CODE- 250: Performed by: NURSE PRACTITIONER

## 2021-01-01 PROCEDURE — 83880 ASSAY OF NATRIURETIC PEPTIDE: CPT

## 2021-01-01 PROCEDURE — 71275 CT ANGIOGRAPHY CHEST: CPT

## 2021-01-01 PROCEDURE — 96374 THER/PROPH/DIAG INJ IV PUSH: CPT

## 2021-01-01 PROCEDURE — 74011250636 HC RX REV CODE- 250/636: Performed by: SURGERY

## 2021-01-01 PROCEDURE — 85027 COMPLETE CBC AUTOMATED: CPT

## 2021-01-01 PROCEDURE — 74011000250 HC RX REV CODE- 250: Performed by: INTERNAL MEDICINE

## 2021-01-01 PROCEDURE — 1090F PRES/ABSN URINE INCON ASSESS: CPT | Performed by: SURGERY

## 2021-01-01 PROCEDURE — 70450 CT HEAD/BRAIN W/O DYE: CPT

## 2021-01-01 PROCEDURE — 74011250636 HC RX REV CODE- 250/636: Performed by: ANESTHESIOLOGY

## 2021-01-01 PROCEDURE — G0151 HHCP-SERV OF PT,EA 15 MIN: HCPCS

## 2021-01-01 PROCEDURE — U0005 INFEC AGEN DETEC AMPLI PROBE: HCPCS

## 2021-01-01 PROCEDURE — 74011250636 HC RX REV CODE- 250/636: Performed by: EMERGENCY MEDICINE

## 2021-01-01 PROCEDURE — 77030040361 HC SLV COMPR DVT MDII -B: Performed by: SURGERY

## 2021-01-01 PROCEDURE — 94762 N-INVAS EAR/PLS OXIMTRY CONT: CPT

## 2021-01-01 PROCEDURE — 99212 OFFICE O/P EST SF 10 MIN: CPT | Performed by: SURGERY

## 2021-01-01 PROCEDURE — 77030026438 HC STYL ET INTUB CARD -A: Performed by: NURSE ANESTHETIST, CERTIFIED REGISTERED

## 2021-01-01 PROCEDURE — G8427 DOCREV CUR MEDS BY ELIG CLIN: HCPCS | Performed by: SURGERY

## 2021-01-01 PROCEDURE — 73552 X-RAY EXAM OF FEMUR 2/>: CPT

## 2021-01-01 PROCEDURE — 3336500001 HSPC ELECTION

## 2021-01-01 PROCEDURE — 74011250636 HC RX REV CODE- 250/636: Performed by: NURSE ANESTHETIST, CERTIFIED REGISTERED

## 2021-01-01 PROCEDURE — G8536 NO DOC ELDER MAL SCRN: HCPCS | Performed by: SURGERY

## 2021-01-01 PROCEDURE — 88342 IMHCHEM/IMCYTCHM 1ST ANTB: CPT

## 2021-01-01 PROCEDURE — 71045 X-RAY EXAM CHEST 1 VIEW: CPT

## 2021-01-01 PROCEDURE — 74177 CT ABD & PELVIS W/CONTRAST: CPT

## 2021-01-01 PROCEDURE — G8510 SCR DEP NEG, NO PLAN REQD: HCPCS | Performed by: SURGERY

## 2021-01-01 PROCEDURE — 97162 PT EVAL MOD COMPLEX 30 MIN: CPT

## 2021-01-01 PROCEDURE — 97161 PT EVAL LOW COMPLEX 20 MIN: CPT

## 2021-01-01 PROCEDURE — 87040 BLOOD CULTURE FOR BACTERIA: CPT

## 2021-01-01 PROCEDURE — 97165 OT EVAL LOW COMPLEX 30 MIN: CPT

## 2021-01-01 PROCEDURE — 1101F PT FALLS ASSESS-DOCD LE1/YR: CPT | Performed by: SURGERY

## 2021-01-01 PROCEDURE — 77030031139 HC SUT VCRL2 J&J -A: Performed by: SURGERY

## 2021-01-01 PROCEDURE — 38525 BIOPSY/REMOVAL LYMPH NODES: CPT | Performed by: SURGERY

## 2021-01-01 PROCEDURE — 74011000250 HC RX REV CODE- 250: Performed by: NURSE ANESTHETIST, CERTIFIED REGISTERED

## 2021-01-01 PROCEDURE — 2709999900 HC NON-CHARGEABLE SUPPLY: Performed by: SURGERY

## 2021-01-01 PROCEDURE — 93306 TTE W/DOPPLER COMPLETE: CPT

## 2021-01-01 PROCEDURE — 77030008684 HC TU ET CUF COVD -B: Performed by: NURSE ANESTHETIST, CERTIFIED REGISTERED

## 2021-01-01 PROCEDURE — 77030040504 HC DRN WND MDII -B: Performed by: SURGERY

## 2021-01-01 PROCEDURE — 76010000149 HC OR TIME 1 TO 1.5 HR: Performed by: SURGERY

## 2021-01-01 PROCEDURE — 77030042556 HC PNCL CAUT -B: Performed by: SURGERY

## 2021-01-01 PROCEDURE — 77030010507 HC ADH SKN DERMBND J&J -B: Performed by: SURGERY

## 2021-01-01 PROCEDURE — 74011636320 HC RX REV CODE- 636/320: Performed by: EMERGENCY MEDICINE

## 2021-01-01 PROCEDURE — 77030008462 HC STPLR SKN PROX J&J -A: Performed by: SURGERY

## 2021-01-01 PROCEDURE — 85610 PROTHROMBIN TIME: CPT

## 2021-01-01 PROCEDURE — 93971 EXTREMITY STUDY: CPT

## 2021-01-01 RX ORDER — ENOXAPARIN SODIUM 100 MG/ML
1 INJECTION SUBCUTANEOUS EVERY 12 HOURS
Status: DISCONTINUED | OUTPATIENT
Start: 2021-01-01 | End: 2021-01-01 | Stop reason: HOSPADM

## 2021-01-01 RX ORDER — FENTANYL CITRATE 50 UG/ML
INJECTION, SOLUTION INTRAMUSCULAR; INTRAVENOUS AS NEEDED
Status: DISCONTINUED | OUTPATIENT
Start: 2021-01-01 | End: 2021-01-01 | Stop reason: HOSPADM

## 2021-01-01 RX ORDER — ASPIRIN 81 MG/1
81 TABLET ORAL DAILY
Status: DISCONTINUED | OUTPATIENT
Start: 2021-01-01 | End: 2021-01-01 | Stop reason: HOSPADM

## 2021-01-01 RX ORDER — HYDRALAZINE HYDROCHLORIDE 20 MG/ML
10 INJECTION INTRAMUSCULAR; INTRAVENOUS
Status: DISCONTINUED | OUTPATIENT
Start: 2021-01-01 | End: 2021-01-01

## 2021-01-01 RX ORDER — PRAVASTATIN SODIUM 20 MG/1
20 TABLET ORAL
COMMUNITY
End: 2021-01-01

## 2021-01-01 RX ORDER — ALBUMIN HUMAN 50 G/1000ML
SOLUTION INTRAVENOUS AS NEEDED
Status: DISCONTINUED | OUTPATIENT
Start: 2021-01-01 | End: 2021-01-01 | Stop reason: HOSPADM

## 2021-01-01 RX ORDER — IBUPROFEN 400 MG/1
400 TABLET ORAL
Qty: 20 TABLET | Refills: 1 | Status: SHIPPED
Start: 2021-01-01 | End: 2021-01-01

## 2021-01-01 RX ORDER — GADOTERATE MEGLUMINE 376.9 MG/ML
15 INJECTION INTRAVENOUS
Status: ACTIVE | OUTPATIENT
Start: 2021-01-01 | End: 2021-01-01

## 2021-01-01 RX ORDER — HYDROCODONE BITARTRATE AND ACETAMINOPHEN 5; 325 MG/1; MG/1
1 TABLET ORAL
Qty: 20 TABLET | Refills: 0 | Status: SHIPPED | OUTPATIENT
Start: 2021-01-01 | End: 2021-01-01

## 2021-01-01 RX ORDER — HYDROMORPHONE HYDROCHLORIDE 1 MG/ML
0.5 INJECTION, SOLUTION INTRAMUSCULAR; INTRAVENOUS; SUBCUTANEOUS ONCE
Status: COMPLETED | OUTPATIENT
Start: 2021-01-01 | End: 2021-01-01

## 2021-01-01 RX ORDER — METOPROLOL TARTRATE 25 MG/1
TABLET, FILM COATED ORAL 2 TIMES DAILY
COMMUNITY
End: 2021-01-01

## 2021-01-01 RX ORDER — GUAIFENESIN 100 MG/5ML
81 LIQUID (ML) ORAL DAILY
Status: DISCONTINUED | OUTPATIENT
Start: 2021-01-01 | End: 2021-01-01 | Stop reason: HOSPADM

## 2021-01-01 RX ORDER — ACETAMINOPHEN 325 MG/1
650 TABLET ORAL
Status: DISCONTINUED | OUTPATIENT
Start: 2021-01-01 | End: 2021-01-01 | Stop reason: HOSPADM

## 2021-01-01 RX ORDER — DIPHENHYDRAMINE HYDROCHLORIDE 50 MG/ML
25 INJECTION, SOLUTION INTRAMUSCULAR; INTRAVENOUS
Status: COMPLETED | OUTPATIENT
Start: 2021-01-01 | End: 2021-01-01

## 2021-01-01 RX ORDER — IBUPROFEN 400 MG/1
400 TABLET ORAL ONCE
Status: COMPLETED | OUTPATIENT
Start: 2021-01-01 | End: 2021-01-01

## 2021-01-01 RX ORDER — FUROSEMIDE 20 MG/1
20 TABLET ORAL DAILY
Status: DISCONTINUED | OUTPATIENT
Start: 2021-01-01 | End: 2021-01-01 | Stop reason: HOSPADM

## 2021-01-01 RX ORDER — FENTANYL CITRATE 50 UG/ML
50 INJECTION, SOLUTION INTRAMUSCULAR; INTRAVENOUS AS NEEDED
Status: DISCONTINUED | OUTPATIENT
Start: 2021-01-01 | End: 2021-01-01 | Stop reason: HOSPADM

## 2021-01-01 RX ORDER — SODIUM CHLORIDE 0.9 % (FLUSH) 0.9 %
10 SYRINGE (ML) INJECTION
Status: COMPLETED | OUTPATIENT
Start: 2021-01-01 | End: 2021-01-01

## 2021-01-01 RX ORDER — CALCIUM CARBONATE 200(500)MG
200 TABLET,CHEWABLE ORAL
Status: DISCONTINUED | OUTPATIENT
Start: 2021-01-01 | End: 2021-01-01 | Stop reason: HOSPADM

## 2021-01-01 RX ORDER — SODIUM CHLORIDE 0.9 % (FLUSH) 0.9 %
5-40 SYRINGE (ML) INJECTION EVERY 8 HOURS
Status: DISCONTINUED | OUTPATIENT
Start: 2021-01-01 | End: 2021-01-01 | Stop reason: HOSPADM

## 2021-01-01 RX ORDER — OXYCODONE AND ACETAMINOPHEN 5; 325 MG/1; MG/1
1 TABLET ORAL
Status: DISCONTINUED | OUTPATIENT
Start: 2021-01-01 | End: 2021-01-01 | Stop reason: HOSPADM

## 2021-01-01 RX ORDER — SODIUM CHLORIDE 0.9 % (FLUSH) 0.9 %
5-10 SYRINGE (ML) INJECTION AS NEEDED
Status: DISCONTINUED | OUTPATIENT
Start: 2021-01-01 | End: 2021-01-01 | Stop reason: HOSPADM

## 2021-01-01 RX ORDER — CLOTRIMAZOLE AND BETAMETHASONE DIPROPIONATE 10; .64 MG/G; MG/G
CREAM TOPICAL 2 TIMES DAILY
Status: DISCONTINUED | OUTPATIENT
Start: 2021-01-01 | End: 2021-01-01

## 2021-01-01 RX ORDER — ONDANSETRON 4 MG/1
4 TABLET, ORALLY DISINTEGRATING ORAL
Status: DISCONTINUED | OUTPATIENT
Start: 2021-01-01 | End: 2021-01-01 | Stop reason: HOSPADM

## 2021-01-01 RX ORDER — ONDANSETRON 2 MG/ML
INJECTION INTRAMUSCULAR; INTRAVENOUS AS NEEDED
Status: DISCONTINUED | OUTPATIENT
Start: 2021-01-01 | End: 2021-01-01 | Stop reason: HOSPADM

## 2021-01-01 RX ORDER — DOXAZOSIN 1 MG/1
1 TABLET ORAL DAILY
Status: DISCONTINUED | OUTPATIENT
Start: 2021-01-01 | End: 2021-01-01 | Stop reason: HOSPADM

## 2021-01-01 RX ORDER — OXYCODONE HYDROCHLORIDE 5 MG/1
5 TABLET ORAL AS NEEDED
Status: DISCONTINUED | OUTPATIENT
Start: 2021-01-01 | End: 2021-01-01 | Stop reason: HOSPADM

## 2021-01-01 RX ORDER — PANTOPRAZOLE SODIUM 40 MG/1
40 TABLET, DELAYED RELEASE ORAL
Qty: 30 TABLET | Refills: 1 | Status: SHIPPED | OUTPATIENT
Start: 2021-01-01

## 2021-01-01 RX ORDER — MIDAZOLAM HYDROCHLORIDE 1 MG/ML
1 INJECTION, SOLUTION INTRAMUSCULAR; INTRAVENOUS AS NEEDED
Status: DISCONTINUED | OUTPATIENT
Start: 2021-01-01 | End: 2021-01-01 | Stop reason: HOSPADM

## 2021-01-01 RX ORDER — DEXTROSE, SODIUM CHLORIDE, SODIUM LACTATE, POTASSIUM CHLORIDE, AND CALCIUM CHLORIDE 5; .6; .31; .03; .02 G/100ML; G/100ML; G/100ML; G/100ML; G/100ML
125 INJECTION, SOLUTION INTRAVENOUS CONTINUOUS
Status: DISCONTINUED | OUTPATIENT
Start: 2021-01-01 | End: 2021-01-01 | Stop reason: HOSPADM

## 2021-01-01 RX ORDER — EZETIMIBE 10 MG/1
10 TABLET ORAL DAILY
Status: DISCONTINUED | OUTPATIENT
Start: 2021-01-01 | End: 2021-01-01 | Stop reason: HOSPADM

## 2021-01-01 RX ORDER — SUCCINYLCHOLINE CHLORIDE 20 MG/ML
INJECTION INTRAMUSCULAR; INTRAVENOUS AS NEEDED
Status: DISCONTINUED | OUTPATIENT
Start: 2021-01-01 | End: 2021-01-01 | Stop reason: HOSPADM

## 2021-01-01 RX ORDER — FAMOTIDINE 10 MG/ML
20 INJECTION INTRAVENOUS
Status: COMPLETED | OUTPATIENT
Start: 2021-01-01 | End: 2021-01-01

## 2021-01-01 RX ORDER — ACETAMINOPHEN 325 MG/1
650 TABLET ORAL ONCE
Status: DISCONTINUED | OUTPATIENT
Start: 2021-01-01 | End: 2021-01-01 | Stop reason: HOSPADM

## 2021-01-01 RX ORDER — ROCURONIUM BROMIDE 10 MG/ML
INJECTION, SOLUTION INTRAVENOUS AS NEEDED
Status: DISCONTINUED | OUTPATIENT
Start: 2021-01-01 | End: 2021-01-01 | Stop reason: HOSPADM

## 2021-01-01 RX ORDER — FLUCONAZOLE 100 MG/1
200 TABLET ORAL DAILY
Status: COMPLETED | OUTPATIENT
Start: 2021-01-01 | End: 2021-01-01

## 2021-01-01 RX ORDER — FENTANYL CITRATE 50 UG/ML
25 INJECTION, SOLUTION INTRAMUSCULAR; INTRAVENOUS
Status: DISCONTINUED | OUTPATIENT
Start: 2021-01-01 | End: 2021-01-01 | Stop reason: HOSPADM

## 2021-01-01 RX ORDER — ONDANSETRON 2 MG/ML
4 INJECTION INTRAMUSCULAR; INTRAVENOUS
Status: DISCONTINUED | OUTPATIENT
Start: 2021-01-01 | End: 2021-01-01 | Stop reason: HOSPADM

## 2021-01-01 RX ORDER — SODIUM CHLORIDE, SODIUM LACTATE, POTASSIUM CHLORIDE, CALCIUM CHLORIDE 600; 310; 30; 20 MG/100ML; MG/100ML; MG/100ML; MG/100ML
INJECTION, SOLUTION INTRAVENOUS
Status: DISCONTINUED | OUTPATIENT
Start: 2021-01-01 | End: 2021-01-01 | Stop reason: HOSPADM

## 2021-01-01 RX ORDER — ONDANSETRON 2 MG/ML
2 INJECTION INTRAMUSCULAR; INTRAVENOUS
Status: DISCONTINUED | OUTPATIENT
Start: 2021-01-01 | End: 2021-01-01 | Stop reason: HOSPADM

## 2021-01-01 RX ORDER — LEVOFLOXACIN 5 MG/ML
750 INJECTION, SOLUTION INTRAVENOUS EVERY 24 HOURS
Status: DISCONTINUED | OUTPATIENT
Start: 2021-01-01 | End: 2021-01-01

## 2021-01-01 RX ORDER — METOPROLOL TARTRATE 5 MG/5ML
5 INJECTION INTRAVENOUS ONCE
Status: COMPLETED | OUTPATIENT
Start: 2021-01-01 | End: 2021-01-01

## 2021-01-01 RX ORDER — CLOTRIMAZOLE AND BETAMETHASONE DIPROPIONATE 10; .64 MG/G; MG/G
CREAM TOPICAL 2 TIMES DAILY
Status: DISCONTINUED | OUTPATIENT
Start: 2021-01-01 | End: 2021-01-01 | Stop reason: HOSPADM

## 2021-01-01 RX ORDER — DIPHENHYDRAMINE HYDROCHLORIDE 50 MG/ML
12.5 INJECTION, SOLUTION INTRAMUSCULAR; INTRAVENOUS EVERY 6 HOURS
Status: COMPLETED | OUTPATIENT
Start: 2021-01-01 | End: 2021-01-01

## 2021-01-01 RX ORDER — CHOLECALCIFEROL TAB 125 MCG (5000 UNIT) 125 MCG
TAB ORAL DAILY
COMMUNITY
End: 2021-01-01

## 2021-01-01 RX ORDER — METOPROLOL TARTRATE 25 MG/1
25 TABLET, FILM COATED ORAL 2 TIMES DAILY
Status: DISCONTINUED | OUTPATIENT
Start: 2021-01-01 | End: 2021-01-01

## 2021-01-01 RX ORDER — SODIUM CHLORIDE, SODIUM LACTATE, POTASSIUM CHLORIDE, CALCIUM CHLORIDE 600; 310; 30; 20 MG/100ML; MG/100ML; MG/100ML; MG/100ML
125 INJECTION, SOLUTION INTRAVENOUS CONTINUOUS
Status: DISCONTINUED | OUTPATIENT
Start: 2021-01-01 | End: 2021-01-01 | Stop reason: HOSPADM

## 2021-01-01 RX ORDER — HYDROMORPHONE HYDROCHLORIDE 1 MG/ML
0.5 INJECTION, SOLUTION INTRAMUSCULAR; INTRAVENOUS; SUBCUTANEOUS ONCE
Status: DISCONTINUED | OUTPATIENT
Start: 2021-01-01 | End: 2021-01-01

## 2021-01-01 RX ORDER — METOPROLOL TARTRATE 50 MG/1
50 TABLET ORAL 2 TIMES DAILY
Status: DISCONTINUED | OUTPATIENT
Start: 2021-01-01 | End: 2021-01-01 | Stop reason: HOSPADM

## 2021-01-01 RX ORDER — VANCOMYCIN HYDROCHLORIDE
1250 EVERY 24 HOURS
Status: DISCONTINUED | OUTPATIENT
Start: 2021-01-01 | End: 2021-01-01

## 2021-01-01 RX ORDER — DOXAZOSIN 2 MG/1
1 TABLET ORAL DAILY
Status: DISCONTINUED | OUTPATIENT
Start: 2021-01-01 | End: 2021-01-01 | Stop reason: HOSPADM

## 2021-01-01 RX ORDER — LACTOBACILLUS RHAMNOSUS GG 10B CELL
1 CAPSULE ORAL AS NEEDED
COMMUNITY
End: 2021-01-01

## 2021-01-01 RX ORDER — EPINEPHRINE 0.1 MG/ML
0.3 INJECTION INTRACARDIAC; INTRAVENOUS
Status: COMPLETED | OUTPATIENT
Start: 2021-01-01 | End: 2021-01-01

## 2021-01-01 RX ORDER — LIDOCAINE HYDROCHLORIDE 20 MG/ML
INJECTION, SOLUTION EPIDURAL; INFILTRATION; INTRACAUDAL; PERINEURAL AS NEEDED
Status: DISCONTINUED | OUTPATIENT
Start: 2021-01-01 | End: 2021-01-01 | Stop reason: HOSPADM

## 2021-01-01 RX ORDER — HYDROCHLOROTHIAZIDE 25 MG/1
TABLET ORAL
COMMUNITY
Start: 2020-07-06 | End: 2021-01-01

## 2021-01-01 RX ORDER — ACETAMINOPHEN 650 MG/1
650 SUPPOSITORY RECTAL
Status: DISCONTINUED | OUTPATIENT
Start: 2021-01-01 | End: 2021-01-01 | Stop reason: HOSPADM

## 2021-01-01 RX ORDER — METOPROLOL TARTRATE 25 MG/1
25 TABLET, FILM COATED ORAL 2 TIMES DAILY
Status: DISCONTINUED | OUTPATIENT
Start: 2021-01-01 | End: 2021-01-01 | Stop reason: HOSPADM

## 2021-01-01 RX ORDER — SODIUM CHLORIDE AND POTASSIUM CHLORIDE .9; .15 G/100ML; G/100ML
SOLUTION INTRAVENOUS CONTINUOUS
Status: DISCONTINUED | OUTPATIENT
Start: 2021-01-01 | End: 2021-01-01

## 2021-01-01 RX ORDER — DEXTROSE, SODIUM CHLORIDE, AND POTASSIUM CHLORIDE 5; .45; .15 G/100ML; G/100ML; G/100ML
100 INJECTION INTRAVENOUS CONTINUOUS
Status: DISCONTINUED | OUTPATIENT
Start: 2021-01-01 | End: 2021-01-01 | Stop reason: HOSPADM

## 2021-01-01 RX ORDER — LANOLIN ALCOHOL/MO/W.PET/CERES
400 CREAM (GRAM) TOPICAL DAILY
Status: DISCONTINUED | OUTPATIENT
Start: 2021-01-01 | End: 2021-01-01 | Stop reason: HOSPADM

## 2021-01-01 RX ORDER — HYDROCODONE BITARTRATE AND ACETAMINOPHEN 5; 325 MG/1; MG/1
1 TABLET ORAL
Status: DISCONTINUED | OUTPATIENT
Start: 2021-01-01 | End: 2021-01-01 | Stop reason: HOSPADM

## 2021-01-01 RX ORDER — CALCIUM CARBONATE 600 MG
600 TABLET ORAL DAILY
COMMUNITY
End: 2021-01-01

## 2021-01-01 RX ORDER — SODIUM CHLORIDE 0.9 % (FLUSH) 0.9 %
5-40 SYRINGE (ML) INJECTION EVERY 8 HOURS
Status: DISCONTINUED | OUTPATIENT
Start: 2021-01-01 | End: 2021-01-01

## 2021-01-01 RX ORDER — SODIUM CHLORIDE 0.9 % (FLUSH) 0.9 %
5-40 SYRINGE (ML) INJECTION AS NEEDED
Status: DISCONTINUED | OUTPATIENT
Start: 2021-01-01 | End: 2021-01-01

## 2021-01-01 RX ORDER — POTASSIUM CHLORIDE 750 MG/1
10 TABLET, FILM COATED, EXTENDED RELEASE ORAL 2 TIMES DAILY
Status: DISCONTINUED | OUTPATIENT
Start: 2021-01-01 | End: 2021-01-01 | Stop reason: HOSPADM

## 2021-01-01 RX ORDER — EPINEPHRINE 1 MG/ML
INJECTION, SOLUTION, CONCENTRATE INTRAVENOUS
Status: DISPENSED
Start: 2021-01-01 | End: 2021-01-01

## 2021-01-01 RX ORDER — POLYETHYLENE GLYCOL 3350 17 G/17G
17 POWDER, FOR SOLUTION ORAL DAILY
Qty: 30 EACH | Refills: 1 | Status: SHIPPED
Start: 2021-01-01 | End: 2021-01-01

## 2021-01-01 RX ORDER — ASCORBIC ACID 500 MG
1000 TABLET ORAL DAILY
COMMUNITY
End: 2021-01-01

## 2021-01-01 RX ORDER — VANCOMYCIN/0.9 % SOD CHLORIDE 1.5G/250ML
1500 PLASTIC BAG, INJECTION (ML) INTRAVENOUS ONCE
Status: COMPLETED | OUTPATIENT
Start: 2021-01-01 | End: 2021-01-01

## 2021-01-01 RX ORDER — ATORVASTATIN CALCIUM 20 MG/1
20 TABLET, FILM COATED ORAL
Status: DISCONTINUED | OUTPATIENT
Start: 2021-01-01 | End: 2021-01-01 | Stop reason: HOSPADM

## 2021-01-01 RX ORDER — POTASSIUM CHLORIDE 7.45 MG/ML
10 INJECTION INTRAVENOUS
Status: COMPLETED | OUTPATIENT
Start: 2021-01-01 | End: 2021-01-01

## 2021-01-01 RX ORDER — POLYETHYLENE GLYCOL 3350 17 G/17G
17 POWDER, FOR SOLUTION ORAL DAILY PRN
Status: DISCONTINUED | OUTPATIENT
Start: 2021-01-01 | End: 2021-01-01 | Stop reason: HOSPADM

## 2021-01-01 RX ORDER — HYDROCHLOROTHIAZIDE 25 MG/1
25 TABLET ORAL DAILY
Status: DISCONTINUED | OUTPATIENT
Start: 2021-01-01 | End: 2021-01-01 | Stop reason: HOSPADM

## 2021-01-01 RX ORDER — UREA 10 %
100 LOTION (ML) TOPICAL DAILY
COMMUNITY
End: 2021-01-01

## 2021-01-01 RX ORDER — HYDRALAZINE HYDROCHLORIDE 20 MG/ML
20 INJECTION INTRAMUSCULAR; INTRAVENOUS
Status: DISCONTINUED | OUTPATIENT
Start: 2021-01-01 | End: 2021-01-01 | Stop reason: HOSPADM

## 2021-01-01 RX ORDER — LIDOCAINE HYDROCHLORIDE 10 MG/ML
0.5 INJECTION, SOLUTION EPIDURAL; INFILTRATION; INTRACAUDAL; PERINEURAL AS NEEDED
Status: DISCONTINUED | OUTPATIENT
Start: 2021-01-01 | End: 2021-01-01 | Stop reason: HOSPADM

## 2021-01-01 RX ORDER — PANTOPRAZOLE SODIUM 40 MG/1
40 TABLET, DELAYED RELEASE ORAL
Status: DISCONTINUED | OUTPATIENT
Start: 2021-01-01 | End: 2021-01-01 | Stop reason: HOSPADM

## 2021-01-01 RX ORDER — IBUPROFEN 400 MG/1
400 TABLET ORAL
Status: DISCONTINUED | OUTPATIENT
Start: 2021-01-01 | End: 2021-01-01 | Stop reason: HOSPADM

## 2021-01-01 RX ORDER — ENOXAPARIN SODIUM 100 MG/ML
60 INJECTION SUBCUTANEOUS
Status: COMPLETED | OUTPATIENT
Start: 2021-01-01 | End: 2021-01-01

## 2021-01-01 RX ORDER — SODIUM CHLORIDE 0.9 % (FLUSH) 0.9 %
5-40 SYRINGE (ML) INJECTION AS NEEDED
Status: DISCONTINUED | OUTPATIENT
Start: 2021-01-01 | End: 2021-01-01 | Stop reason: HOSPADM

## 2021-01-01 RX ORDER — ONDANSETRON 2 MG/ML
4 INJECTION INTRAMUSCULAR; INTRAVENOUS
Status: COMPLETED | OUTPATIENT
Start: 2021-01-01 | End: 2021-01-01

## 2021-01-01 RX ORDER — HYDROCODONE BITARTRATE AND ACETAMINOPHEN 10; 325 MG/1; MG/1
1 TABLET ORAL
Status: DISCONTINUED | OUTPATIENT
Start: 2021-01-01 | End: 2021-01-01 | Stop reason: HOSPADM

## 2021-01-01 RX ORDER — HYDROMORPHONE HYDROCHLORIDE 1 MG/ML
1 INJECTION, SOLUTION INTRAMUSCULAR; INTRAVENOUS; SUBCUTANEOUS
Status: DISCONTINUED | OUTPATIENT
Start: 2021-01-01 | End: 2021-01-01 | Stop reason: HOSPADM

## 2021-01-01 RX ORDER — PROPOFOL 10 MG/ML
INJECTION, EMULSION INTRAVENOUS AS NEEDED
Status: DISCONTINUED | OUTPATIENT
Start: 2021-01-01 | End: 2021-01-01 | Stop reason: HOSPADM

## 2021-01-01 RX ORDER — KETAMINE HYDROCHLORIDE 10 MG/ML
INJECTION, SOLUTION INTRAMUSCULAR; INTRAVENOUS AS NEEDED
Status: DISCONTINUED | OUTPATIENT
Start: 2021-01-01 | End: 2021-01-01 | Stop reason: HOSPADM

## 2021-01-01 RX ORDER — DOXAZOSIN 2 MG/1
2 TABLET ORAL DAILY
Status: DISCONTINUED | OUTPATIENT
Start: 2021-01-01 | End: 2021-01-01 | Stop reason: HOSPADM

## 2021-01-01 RX ORDER — PHENYLEPHRINE HCL IN 0.9% NACL 0.4MG/10ML
SYRINGE (ML) INTRAVENOUS AS NEEDED
Status: DISCONTINUED | OUTPATIENT
Start: 2021-01-01 | End: 2021-01-01 | Stop reason: HOSPADM

## 2021-01-01 RX ORDER — ONDANSETRON 2 MG/ML
4 INJECTION INTRAMUSCULAR; INTRAVENOUS AS NEEDED
Status: DISCONTINUED | OUTPATIENT
Start: 2021-01-01 | End: 2021-01-01 | Stop reason: HOSPADM

## 2021-01-01 RX ORDER — FUROSEMIDE 20 MG/1
20 TABLET ORAL DAILY
Qty: 30 TABLET | Refills: 5 | Status: SHIPPED
Start: 2021-01-01 | End: 2021-01-01

## 2021-01-01 RX ORDER — DIPHENHYDRAMINE HYDROCHLORIDE 50 MG/ML
12.5 INJECTION, SOLUTION INTRAMUSCULAR; INTRAVENOUS AS NEEDED
Status: DISCONTINUED | OUTPATIENT
Start: 2021-01-01 | End: 2021-01-01 | Stop reason: HOSPADM

## 2021-01-01 RX ORDER — MORPHINE SULFATE 2 MG/ML
2 INJECTION, SOLUTION INTRAMUSCULAR; INTRAVENOUS
Status: DISCONTINUED | OUTPATIENT
Start: 2021-01-01 | End: 2021-01-01 | Stop reason: HOSPADM

## 2021-01-01 RX ORDER — CARVEDILOL 6.25 MG/1
6.25 TABLET ORAL 2 TIMES DAILY WITH MEALS
Status: DISCONTINUED | OUTPATIENT
Start: 2021-01-01 | End: 2021-01-01 | Stop reason: HOSPADM

## 2021-01-01 RX ORDER — LEVOFLOXACIN 5 MG/ML
500 INJECTION, SOLUTION INTRAVENOUS ONCE
Status: COMPLETED | OUTPATIENT
Start: 2021-01-01 | End: 2021-01-01

## 2021-01-01 RX ORDER — ALBUTEROL SULFATE 0.83 MG/ML
2.5 SOLUTION RESPIRATORY (INHALATION)
Status: DISCONTINUED | OUTPATIENT
Start: 2021-01-01 | End: 2021-01-01 | Stop reason: HOSPADM

## 2021-01-01 RX ORDER — ENOXAPARIN SODIUM 100 MG/ML
40 INJECTION SUBCUTANEOUS DAILY
Status: DISCONTINUED | OUTPATIENT
Start: 2021-01-01 | End: 2021-01-01 | Stop reason: HOSPADM

## 2021-01-01 RX ORDER — HYDROCHLOROTHIAZIDE 25 MG/1
12.5 TABLET ORAL DAILY
Status: DISCONTINUED | OUTPATIENT
Start: 2021-01-01 | End: 2021-01-01

## 2021-01-01 RX ORDER — MIDAZOLAM HYDROCHLORIDE 1 MG/ML
1 INJECTION, SOLUTION INTRAMUSCULAR; INTRAVENOUS
Status: DISCONTINUED | OUTPATIENT
Start: 2021-01-01 | End: 2021-01-01 | Stop reason: HOSPADM

## 2021-01-01 RX ORDER — DEXAMETHASONE SODIUM PHOSPHATE 4 MG/ML
INJECTION, SOLUTION INTRA-ARTICULAR; INTRALESIONAL; INTRAMUSCULAR; INTRAVENOUS; SOFT TISSUE AS NEEDED
Status: DISCONTINUED | OUTPATIENT
Start: 2021-01-01 | End: 2021-01-01 | Stop reason: HOSPADM

## 2021-01-01 RX ORDER — PRAVASTATIN SODIUM 20 MG/1
20 TABLET ORAL
Status: DISCONTINUED | OUTPATIENT
Start: 2021-01-01 | End: 2021-01-01 | Stop reason: HOSPADM

## 2021-01-01 RX ORDER — HYDRALAZINE HYDROCHLORIDE 20 MG/ML
20 INJECTION INTRAMUSCULAR; INTRAVENOUS
Status: DISCONTINUED | OUTPATIENT
Start: 2021-01-01 | End: 2021-01-01

## 2021-01-01 RX ORDER — LEVOFLOXACIN 5 MG/ML
500 INJECTION, SOLUTION INTRAVENOUS
Status: COMPLETED | OUTPATIENT
Start: 2021-01-01 | End: 2021-01-01

## 2021-01-01 RX ADMIN — ACETAMINOPHEN 650 MG: 325 TABLET ORAL at 21:20

## 2021-01-01 RX ADMIN — HYOSCYAMINE SULFATE 0.12 MG: 0.12 TABLET SUBLINGUAL at 10:30

## 2021-01-01 RX ADMIN — EZETIMIBE 10 MG: 10 TABLET ORAL at 08:56

## 2021-01-01 RX ADMIN — OXYCODONE HYDROCHLORIDE AND ACETAMINOPHEN 1 TABLET: 5; 325 TABLET ORAL at 19:33

## 2021-01-01 RX ADMIN — DOXAZOSIN 2 MG: 2 TABLET ORAL at 10:02

## 2021-01-01 RX ADMIN — POTASSIUM CHLORIDE 10 MEQ: 750 TABLET, FILM COATED, EXTENDED RELEASE ORAL at 09:05

## 2021-01-01 RX ADMIN — METOPROLOL TARTRATE 50 MG: 50 TABLET, FILM COATED ORAL at 17:13

## 2021-01-01 RX ADMIN — PANTOPRAZOLE SODIUM 40 MG: 40 TABLET, DELAYED RELEASE ORAL at 06:58

## 2021-01-01 RX ADMIN — ACETAMINOPHEN 650 MG: 325 TABLET ORAL at 14:40

## 2021-01-01 RX ADMIN — METOPROLOL TARTRATE 50 MG: 50 TABLET, FILM COATED ORAL at 09:28

## 2021-01-01 RX ADMIN — DOXAZOSIN 2 MG: 2 TABLET ORAL at 08:17

## 2021-01-01 RX ADMIN — ONDANSETRON HYDROCHLORIDE 4 MG: 2 INJECTION, SOLUTION INTRAMUSCULAR; INTRAVENOUS at 12:56

## 2021-01-01 RX ADMIN — PRAVASTATIN SODIUM 20 MG: 20 TABLET ORAL at 21:25

## 2021-01-01 RX ADMIN — METOPROLOL TARTRATE 50 MG: 50 TABLET, FILM COATED ORAL at 09:25

## 2021-01-01 RX ADMIN — ASPIRIN 81 MG: 81 TABLET, COATED ORAL at 09:01

## 2021-01-01 RX ADMIN — CALCIUM CARBONATE (ANTACID) CHEW TAB 500 MG 200 MG: 500 CHEW TAB at 14:19

## 2021-01-01 RX ADMIN — PROPOFOL 25 MG: 10 INJECTION, EMULSION INTRAVENOUS at 12:57

## 2021-01-01 RX ADMIN — Medication 80 MCG: at 13:06

## 2021-01-01 RX ADMIN — GADOTERIDOL 15 ML: 279.3 INJECTION, SOLUTION INTRAVENOUS at 23:11

## 2021-01-01 RX ADMIN — CALCIUM CARBONATE (ANTACID) CHEW TAB 500 MG 200 MG: 500 CHEW TAB at 11:39

## 2021-01-01 RX ADMIN — PANTOPRAZOLE SODIUM 40 MG: 40 TABLET, DELAYED RELEASE ORAL at 17:23

## 2021-01-01 RX ADMIN — DOXAZOSIN 2 MG: 2 TABLET ORAL at 09:25

## 2021-01-01 RX ADMIN — SODIUM CHLORIDE 10 ML: 9 INJECTION, SOLUTION INTRAMUSCULAR; INTRAVENOUS; SUBCUTANEOUS at 07:14

## 2021-01-01 RX ADMIN — MAGNESIUM OXIDE 400 MG (241.3 MG MAGNESIUM) TABLET 400 MG: TABLET at 08:25

## 2021-01-01 RX ADMIN — METOPROLOL TARTRATE 50 MG: 50 TABLET, FILM COATED ORAL at 09:24

## 2021-01-01 RX ADMIN — MAGNESIUM OXIDE 400 MG (241.3 MG MAGNESIUM) TABLET 400 MG: TABLET at 09:28

## 2021-01-01 RX ADMIN — IBUPROFEN 400 MG: 400 TABLET, FILM COATED ORAL at 18:01

## 2021-01-01 RX ADMIN — SODIUM CHLORIDE 10 ML: 9 INJECTION, SOLUTION INTRAMUSCULAR; INTRAVENOUS; SUBCUTANEOUS at 21:41

## 2021-01-01 RX ADMIN — ENOXAPARIN SODIUM 60 MG: 30 INJECTION SUBCUTANEOUS at 21:42

## 2021-01-01 RX ADMIN — METOPROLOL TARTRATE 25 MG: 25 TABLET, FILM COATED ORAL at 17:23

## 2021-01-01 RX ADMIN — METOPROLOL TARTRATE 50 MG: 50 TABLET, FILM COATED ORAL at 17:33

## 2021-01-01 RX ADMIN — ASPIRIN 81 MG: 81 TABLET, CHEWABLE ORAL at 10:07

## 2021-01-01 RX ADMIN — EZETIMIBE 10 MG: 10 TABLET ORAL at 10:02

## 2021-01-01 RX ADMIN — ENOXAPARIN SODIUM 60 MG: 30 INJECTION SUBCUTANEOUS at 10:21

## 2021-01-01 RX ADMIN — ENOXAPARIN SODIUM 60 MG: 30 INJECTION SUBCUTANEOUS at 09:19

## 2021-01-01 RX ADMIN — PRAVASTATIN SODIUM 20 MG: 20 TABLET ORAL at 22:25

## 2021-01-01 RX ADMIN — METOPROLOL TARTRATE 50 MG: 50 TABLET, FILM COATED ORAL at 17:36

## 2021-01-01 RX ADMIN — DOXAZOSIN 1 MG: 2 TABLET ORAL at 09:05

## 2021-01-01 RX ADMIN — METOPROLOL TARTRATE 25 MG: 25 TABLET, FILM COATED ORAL at 17:12

## 2021-01-01 RX ADMIN — ENOXAPARIN SODIUM 60 MG: 30 INJECTION SUBCUTANEOUS at 10:26

## 2021-01-01 RX ADMIN — DOXAZOSIN 1 MG: 2 TABLET ORAL at 09:29

## 2021-01-01 RX ADMIN — ENOXAPARIN SODIUM 60 MG: 30 INJECTION SUBCUTANEOUS at 09:25

## 2021-01-01 RX ADMIN — CLOTRIMAZOLE AND BETAMETHASONE DIPROPIONATE: 10; .5 CREAM TOPICAL at 08:46

## 2021-01-01 RX ADMIN — ASPIRIN 81 MG: 81 TABLET, CHEWABLE ORAL at 09:26

## 2021-01-01 RX ADMIN — CALCIUM CARBONATE (ANTACID) CHEW TAB 500 MG 200 MG: 500 CHEW TAB at 07:01

## 2021-01-01 RX ADMIN — HYDROCHLOROTHIAZIDE 25 MG: 25 TABLET ORAL at 09:24

## 2021-01-01 RX ADMIN — POTASSIUM CHLORIDE 10 MEQ: 750 TABLET, FILM COATED, EXTENDED RELEASE ORAL at 10:05

## 2021-01-01 RX ADMIN — EZETIMIBE 10 MG: 10 TABLET ORAL at 09:11

## 2021-01-01 RX ADMIN — CALCIUM CARBONATE (ANTACID) CHEW TAB 500 MG 200 MG: 500 CHEW TAB at 07:09

## 2021-01-01 RX ADMIN — PRAVASTATIN SODIUM 20 MG: 20 TABLET ORAL at 21:10

## 2021-01-01 RX ADMIN — CARVEDILOL 6.25 MG: 6.25 TABLET, FILM COATED ORAL at 16:12

## 2021-01-01 RX ADMIN — ONDANSETRON 4 MG: 4 TABLET, ORALLY DISINTEGRATING ORAL at 10:07

## 2021-01-01 RX ADMIN — ASPIRIN 81 MG: 81 TABLET, COATED ORAL at 09:11

## 2021-01-01 RX ADMIN — ENOXAPARIN SODIUM 40 MG: 40 INJECTION SUBCUTANEOUS at 08:57

## 2021-01-01 RX ADMIN — METOPROLOL TARTRATE 25 MG: 25 TABLET, FILM COATED ORAL at 08:42

## 2021-01-01 RX ADMIN — ENOXAPARIN SODIUM 60 MG: 30 INJECTION SUBCUTANEOUS at 09:21

## 2021-01-01 RX ADMIN — POTASSIUM CHLORIDE 10 MEQ: 750 TABLET, FILM COATED, EXTENDED RELEASE ORAL at 18:23

## 2021-01-01 RX ADMIN — CALCIUM CARBONATE (ANTACID) CHEW TAB 500 MG 200 MG: 500 CHEW TAB at 07:03

## 2021-01-01 RX ADMIN — PANTOPRAZOLE SODIUM 40 MG: 40 TABLET, DELAYED RELEASE ORAL at 06:48

## 2021-01-01 RX ADMIN — HYDROCHLOROTHIAZIDE 25 MG: 25 TABLET ORAL at 08:49

## 2021-01-01 RX ADMIN — ASPIRIN 81 MG: 81 TABLET, CHEWABLE ORAL at 08:25

## 2021-01-01 RX ADMIN — ASPIRIN 81 MG: 81 TABLET, CHEWABLE ORAL at 08:49

## 2021-01-01 RX ADMIN — Medication 80 MCG: at 13:04

## 2021-01-01 RX ADMIN — IOPAMIDOL 100 ML: 755 INJECTION, SOLUTION INTRAVENOUS at 18:58

## 2021-01-01 RX ADMIN — LEVOFLOXACIN 750 MG: 5 INJECTION, SOLUTION INTRAVENOUS at 06:05

## 2021-01-01 RX ADMIN — METOPROLOL TARTRATE 25 MG: 25 TABLET, FILM COATED ORAL at 08:52

## 2021-01-01 RX ADMIN — POTASSIUM CHLORIDE 10 MEQ: 750 TABLET, FILM COATED, EXTENDED RELEASE ORAL at 17:14

## 2021-01-01 RX ADMIN — CALCIUM CARBONATE (ANTACID) CHEW TAB 500 MG 200 MG: 500 CHEW TAB at 06:58

## 2021-01-01 RX ADMIN — EZETIMIBE 10 MG: 10 TABLET ORAL at 09:05

## 2021-01-01 RX ADMIN — DOXAZOSIN 1 MG: 2 TABLET ORAL at 08:26

## 2021-01-01 RX ADMIN — TOBRAMYCIN SULFATE 370 MG: 40 INJECTION, SOLUTION INTRAMUSCULAR; INTRAVENOUS at 09:38

## 2021-01-01 RX ADMIN — PANTOPRAZOLE SODIUM 40 MG: 40 TABLET, DELAYED RELEASE ORAL at 06:30

## 2021-01-01 RX ADMIN — CALCIUM CARBONATE (ANTACID) CHEW TAB 500 MG 200 MG: 500 CHEW TAB at 17:49

## 2021-01-01 RX ADMIN — PANTOPRAZOLE SODIUM 40 MG: 40 TABLET, DELAYED RELEASE ORAL at 06:42

## 2021-01-01 RX ADMIN — ACETAMINOPHEN 650 MG: 325 TABLET ORAL at 20:13

## 2021-01-01 RX ADMIN — OXYCODONE HYDROCHLORIDE AND ACETAMINOPHEN 1 TABLET: 5; 325 TABLET ORAL at 23:34

## 2021-01-01 RX ADMIN — METOPROLOL TARTRATE 25 MG: 25 TABLET, FILM COATED ORAL at 18:23

## 2021-01-01 RX ADMIN — METOPROLOL TARTRATE 25 MG: 25 TABLET, FILM COATED ORAL at 02:47

## 2021-01-01 RX ADMIN — POTASSIUM CHLORIDE 10 MEQ: 750 TABLET, FILM COATED, EXTENDED RELEASE ORAL at 18:22

## 2021-01-01 RX ADMIN — CALCIUM CARBONATE (ANTACID) CHEW TAB 500 MG 200 MG: 500 CHEW TAB at 12:00

## 2021-01-01 RX ADMIN — ENOXAPARIN SODIUM 60 MG: 30 INJECTION SUBCUTANEOUS at 21:26

## 2021-01-01 RX ADMIN — CALCIUM CARBONATE (ANTACID) CHEW TAB 500 MG 200 MG: 500 CHEW TAB at 11:20

## 2021-01-01 RX ADMIN — DOXAZOSIN 1 MG: 2 TABLET ORAL at 10:07

## 2021-01-01 RX ADMIN — METOPROLOL TARTRATE 50 MG: 50 TABLET, FILM COATED ORAL at 17:17

## 2021-01-01 RX ADMIN — CALCIUM CARBONATE (ANTACID) CHEW TAB 500 MG 200 MG: 500 CHEW TAB at 11:22

## 2021-01-01 RX ADMIN — HYDRALAZINE HYDROCHLORIDE 10 MG: 20 INJECTION INTRAMUSCULAR; INTRAVENOUS at 01:27

## 2021-01-01 RX ADMIN — OXYCODONE HYDROCHLORIDE AND ACETAMINOPHEN 1 TABLET: 5; 325 TABLET ORAL at 21:28

## 2021-01-01 RX ADMIN — ASPIRIN 81 MG: 81 TABLET, COATED ORAL at 09:25

## 2021-01-01 RX ADMIN — OXYCODONE HYDROCHLORIDE AND ACETAMINOPHEN 1 TABLET: 5; 325 TABLET ORAL at 06:48

## 2021-01-01 RX ADMIN — MAGNESIUM OXIDE 400 MG (241.3 MG MAGNESIUM) TABLET 400 MG: TABLET at 10:06

## 2021-01-01 RX ADMIN — SODIUM CHLORIDE 10 ML: 9 INJECTION, SOLUTION INTRAMUSCULAR; INTRAVENOUS; SUBCUTANEOUS at 14:00

## 2021-01-01 RX ADMIN — METOPROLOL TARTRATE 50 MG: 50 TABLET, FILM COATED ORAL at 10:02

## 2021-01-01 RX ADMIN — OXYCODONE HYDROCHLORIDE AND ACETAMINOPHEN 1 TABLET: 5; 325 TABLET ORAL at 16:12

## 2021-01-01 RX ADMIN — ASPIRIN 81 MG: 81 TABLET, CHEWABLE ORAL at 08:42

## 2021-01-01 RX ADMIN — METOPROLOL TARTRATE 25 MG: 25 TABLET, FILM COATED ORAL at 09:25

## 2021-01-01 RX ADMIN — METOPROLOL TARTRATE 50 MG: 50 TABLET, FILM COATED ORAL at 19:40

## 2021-01-01 RX ADMIN — METOPROLOL TARTRATE 50 MG: 50 TABLET, FILM COATED ORAL at 19:33

## 2021-01-01 RX ADMIN — METHYLPREDNISOLONE SODIUM SUCCINATE 125 MG: 125 INJECTION, POWDER, FOR SOLUTION INTRAMUSCULAR; INTRAVENOUS at 15:27

## 2021-01-01 RX ADMIN — HYDROCODONE BITARTRATE AND ACETAMINOPHEN 1 TABLET: 5; 325 TABLET ORAL at 08:52

## 2021-01-01 RX ADMIN — FLUCONAZOLE 200 MG: 100 TABLET ORAL at 08:17

## 2021-01-01 RX ADMIN — DOXAZOSIN 1 MG: 2 TABLET ORAL at 08:52

## 2021-01-01 RX ADMIN — ONDANSETRON 2 MG: 2 INJECTION INTRAMUSCULAR; INTRAVENOUS at 19:05

## 2021-01-01 RX ADMIN — DOXAZOSIN 2 MG: 2 TABLET ORAL at 09:04

## 2021-01-01 RX ADMIN — MAGNESIUM OXIDE 400 MG (241.3 MG MAGNESIUM) TABLET 400 MG: TABLET at 09:32

## 2021-01-01 RX ADMIN — MAGNESIUM OXIDE 400 MG (241.3 MG MAGNESIUM) TABLET 400 MG: TABLET at 09:25

## 2021-01-01 RX ADMIN — CALCIUM CARBONATE (ANTACID) CHEW TAB 500 MG 200 MG: 500 CHEW TAB at 17:13

## 2021-01-01 RX ADMIN — PANTOPRAZOLE SODIUM 40 MG: 40 TABLET, DELAYED RELEASE ORAL at 07:06

## 2021-01-01 RX ADMIN — HYDROCHLOROTHIAZIDE 12.5 MG: 25 TABLET ORAL at 09:25

## 2021-01-01 RX ADMIN — METOPROLOL TARTRATE 50 MG: 50 TABLET, FILM COATED ORAL at 09:12

## 2021-01-01 RX ADMIN — POTASSIUM CHLORIDE 10 MEQ: 750 TABLET, FILM COATED, EXTENDED RELEASE ORAL at 17:10

## 2021-01-01 RX ADMIN — ENOXAPARIN SODIUM 60 MG: 30 INJECTION SUBCUTANEOUS at 22:25

## 2021-01-01 RX ADMIN — DOXAZOSIN 2 MG: 2 TABLET ORAL at 09:26

## 2021-01-01 RX ADMIN — DOXAZOSIN 1 MG: 2 TABLET ORAL at 09:25

## 2021-01-01 RX ADMIN — FLUCONAZOLE 200 MG: 100 TABLET ORAL at 08:45

## 2021-01-01 RX ADMIN — HYDROCHLOROTHIAZIDE 25 MG: 25 TABLET ORAL at 08:42

## 2021-01-01 RX ADMIN — ENOXAPARIN SODIUM 60 MG: 30 INJECTION SUBCUTANEOUS at 21:22

## 2021-01-01 RX ADMIN — ACETAMINOPHEN 650 MG: 325 TABLET ORAL at 17:21

## 2021-01-01 RX ADMIN — Medication 80 MCG: at 12:54

## 2021-01-01 RX ADMIN — POTASSIUM CHLORIDE 10 MEQ: 750 TABLET, FILM COATED, EXTENDED RELEASE ORAL at 18:12

## 2021-01-01 RX ADMIN — METOPROLOL TARTRATE 25 MG: 25 TABLET, FILM COATED ORAL at 10:06

## 2021-01-01 RX ADMIN — PANTOPRAZOLE SODIUM 40 MG: 40 TABLET, DELAYED RELEASE ORAL at 06:50

## 2021-01-01 RX ADMIN — ASPIRIN 81 MG: 81 TABLET, CHEWABLE ORAL at 08:56

## 2021-01-01 RX ADMIN — METOPROLOL TARTRATE 25 MG: 25 TABLET, FILM COATED ORAL at 09:28

## 2021-01-01 RX ADMIN — EZETIMIBE 10 MG: 10 TABLET ORAL at 09:24

## 2021-01-01 RX ADMIN — IBUPROFEN 400 MG: 400 TABLET, FILM COATED ORAL at 10:07

## 2021-01-01 RX ADMIN — DOXAZOSIN 1 MG: 2 TABLET ORAL at 09:32

## 2021-01-01 RX ADMIN — ATORVASTATIN CALCIUM 20 MG: 20 TABLET, FILM COATED ORAL at 21:49

## 2021-01-01 RX ADMIN — DOXAZOSIN 2 MG: 2 TABLET ORAL at 09:12

## 2021-01-01 RX ADMIN — METOPROLOL TARTRATE 25 MG: 25 TABLET, FILM COATED ORAL at 08:57

## 2021-01-01 RX ADMIN — METOPROLOL TARTRATE 25 MG: 25 TABLET, FILM COATED ORAL at 17:10

## 2021-01-01 RX ADMIN — FUROSEMIDE 20 MG: 20 TABLET ORAL at 08:26

## 2021-01-01 RX ADMIN — LEVOFLOXACIN 750 MG: 5 INJECTION, SOLUTION INTRAVENOUS at 07:25

## 2021-01-01 RX ADMIN — SODIUM CHLORIDE 40 ML: 9 INJECTION, SOLUTION INTRAMUSCULAR; INTRAVENOUS; SUBCUTANEOUS at 14:00

## 2021-01-01 RX ADMIN — ATORVASTATIN CALCIUM 20 MG: 20 TABLET, FILM COATED ORAL at 21:43

## 2021-01-01 RX ADMIN — METOPROLOL TARTRATE 50 MG: 50 TABLET, FILM COATED ORAL at 10:00

## 2021-01-01 RX ADMIN — ONDANSETRON 4 MG: 2 INJECTION INTRAMUSCULAR; INTRAVENOUS at 22:33

## 2021-01-01 RX ADMIN — EZETIMIBE 10 MG: 10 TABLET ORAL at 09:19

## 2021-01-01 RX ADMIN — ENOXAPARIN SODIUM 40 MG: 40 INJECTION SUBCUTANEOUS at 09:06

## 2021-01-01 RX ADMIN — METOPROLOL TARTRATE 50 MG: 50 TABLET, FILM COATED ORAL at 18:08

## 2021-01-01 RX ADMIN — METOPROLOL TARTRATE 25 MG: 25 TABLET, FILM COATED ORAL at 18:22

## 2021-01-01 RX ADMIN — OXYCODONE HYDROCHLORIDE AND ACETAMINOPHEN 1 TABLET: 5; 325 TABLET ORAL at 07:35

## 2021-01-01 RX ADMIN — SODIUM CHLORIDE 5 ML: 9 INJECTION, SOLUTION INTRAMUSCULAR; INTRAVENOUS; SUBCUTANEOUS at 05:26

## 2021-01-01 RX ADMIN — ATORVASTATIN CALCIUM 20 MG: 20 TABLET, FILM COATED ORAL at 21:40

## 2021-01-01 RX ADMIN — OXYCODONE HYDROCHLORIDE AND ACETAMINOPHEN 1 TABLET: 5; 325 TABLET ORAL at 22:25

## 2021-01-01 RX ADMIN — DOXAZOSIN 2 MG: 2 TABLET ORAL at 09:24

## 2021-01-01 RX ADMIN — ENOXAPARIN SODIUM 60 MG: 30 INJECTION SUBCUTANEOUS at 10:00

## 2021-01-01 RX ADMIN — ASPIRIN 81 MG: 81 TABLET, CHEWABLE ORAL at 09:28

## 2021-01-01 RX ADMIN — METHYLPREDNISOLONE SODIUM SUCCINATE 125 MG: 125 INJECTION, POWDER, FOR SOLUTION INTRAMUSCULAR; INTRAVENOUS at 22:03

## 2021-01-01 RX ADMIN — PRAVASTATIN SODIUM 20 MG: 20 TABLET ORAL at 23:34

## 2021-01-01 RX ADMIN — CALCIUM CARBONATE (ANTACID) CHEW TAB 500 MG 200 MG: 500 CHEW TAB at 17:17

## 2021-01-01 RX ADMIN — CALCIUM CARBONATE (ANTACID) CHEW TAB 500 MG 200 MG: 500 CHEW TAB at 10:02

## 2021-01-01 RX ADMIN — LEVOFLOXACIN 750 MG: 5 INJECTION, SOLUTION INTRAVENOUS at 06:00

## 2021-01-01 RX ADMIN — POTASSIUM CHLORIDE 10 MEQ: 750 TABLET, FILM COATED, EXTENDED RELEASE ORAL at 09:26

## 2021-01-01 RX ADMIN — PROPOFOL 25 MG: 10 INJECTION, EMULSION INTRAVENOUS at 12:55

## 2021-01-01 RX ADMIN — DIPHENHYDRAMINE HYDROCHLORIDE 12.5 MG: 50 INJECTION, SOLUTION INTRAMUSCULAR; INTRAVENOUS at 08:52

## 2021-01-01 RX ADMIN — ACETAMINOPHEN 650 MG: 325 TABLET ORAL at 06:12

## 2021-01-01 RX ADMIN — PANTOPRAZOLE SODIUM 40 MG: 40 TABLET, DELAYED RELEASE ORAL at 17:14

## 2021-01-01 RX ADMIN — CLOTRIMAZOLE AND BETAMETHASONE DIPROPIONATE: 10; .5 CREAM TOPICAL at 21:22

## 2021-01-01 RX ADMIN — PROPOFOL 50 MG: 10 INJECTION, EMULSION INTRAVENOUS at 12:54

## 2021-01-01 RX ADMIN — CALCIUM CARBONATE (ANTACID) CHEW TAB 500 MG 200 MG: 500 CHEW TAB at 17:33

## 2021-01-01 RX ADMIN — VANCOMYCIN HYDROCHLORIDE 1250 MG: 10 INJECTION, POWDER, LYOPHILIZED, FOR SOLUTION INTRAVENOUS at 10:56

## 2021-01-01 RX ADMIN — OXYCODONE HYDROCHLORIDE AND ACETAMINOPHEN 1 TABLET: 5; 325 TABLET ORAL at 19:25

## 2021-01-01 RX ADMIN — CALCIUM CARBONATE (ANTACID) CHEW TAB 500 MG 200 MG: 500 CHEW TAB at 18:51

## 2021-01-01 RX ADMIN — PANTOPRAZOLE SODIUM 40 MG: 40 TABLET, DELAYED RELEASE ORAL at 07:22

## 2021-01-01 RX ADMIN — METHYLPREDNISOLONE SODIUM SUCCINATE 125 MG: 125 INJECTION, POWDER, FOR SOLUTION INTRAMUSCULAR; INTRAVENOUS at 08:53

## 2021-01-01 RX ADMIN — Medication 10 ML: at 05:39

## 2021-01-01 RX ADMIN — METOPROLOL TARTRATE 25 MG: 25 TABLET, FILM COATED ORAL at 23:59

## 2021-01-01 RX ADMIN — FAMOTIDINE 20 MG: 10 INJECTION, SOLUTION INTRAVENOUS at 10:05

## 2021-01-01 RX ADMIN — EZETIMIBE 10 MG: 10 TABLET ORAL at 10:00

## 2021-01-01 RX ADMIN — FAMOTIDINE 20 MG: 10 INJECTION, SOLUTION INTRAVENOUS at 21:12

## 2021-01-01 RX ADMIN — METOPROLOL TARTRATE 50 MG: 50 TABLET, FILM COATED ORAL at 18:51

## 2021-01-01 RX ADMIN — HYDROCHLOROTHIAZIDE 12.5 MG: 25 TABLET ORAL at 08:56

## 2021-01-01 RX ADMIN — PANTOPRAZOLE SODIUM 40 MG: 40 TABLET, DELAYED RELEASE ORAL at 06:54

## 2021-01-01 RX ADMIN — METOPROLOL TARTRATE 5 MG: 5 INJECTION INTRAVENOUS at 02:50

## 2021-01-01 RX ADMIN — CALCIUM CARBONATE (ANTACID) CHEW TAB 500 MG 200 MG: 500 CHEW TAB at 17:37

## 2021-01-01 RX ADMIN — ENOXAPARIN SODIUM 40 MG: 40 INJECTION SUBCUTANEOUS at 10:06

## 2021-01-01 RX ADMIN — PANTOPRAZOLE SODIUM 40 MG: 40 TABLET, DELAYED RELEASE ORAL at 05:27

## 2021-01-01 RX ADMIN — ONDANSETRON 2 MG: 2 INJECTION INTRAMUSCULAR; INTRAVENOUS at 22:52

## 2021-01-01 RX ADMIN — PRAVASTATIN SODIUM 20 MG: 20 TABLET ORAL at 21:33

## 2021-01-01 RX ADMIN — OXYCODONE HYDROCHLORIDE AND ACETAMINOPHEN 1 TABLET: 5; 325 TABLET ORAL at 11:20

## 2021-01-01 RX ADMIN — Medication 10 ML: at 21:25

## 2021-01-01 RX ADMIN — ENOXAPARIN SODIUM 40 MG: 40 INJECTION SUBCUTANEOUS at 08:27

## 2021-01-01 RX ADMIN — ACETAMINOPHEN 650 MG: 325 TABLET ORAL at 05:32

## 2021-01-01 RX ADMIN — ASPIRIN 81 MG: 81 TABLET, COATED ORAL at 09:24

## 2021-01-01 RX ADMIN — OXYCODONE HYDROCHLORIDE AND ACETAMINOPHEN 1 TABLET: 5; 325 TABLET ORAL at 03:23

## 2021-01-01 RX ADMIN — SODIUM CHLORIDE 10 ML: 9 INJECTION, SOLUTION INTRAMUSCULAR; INTRAVENOUS; SUBCUTANEOUS at 15:27

## 2021-01-01 RX ADMIN — PRAVASTATIN SODIUM 20 MG: 20 TABLET ORAL at 21:20

## 2021-01-01 RX ADMIN — SODIUM CHLORIDE 10 ML: 9 INJECTION, SOLUTION INTRAMUSCULAR; INTRAVENOUS; SUBCUTANEOUS at 21:27

## 2021-01-01 RX ADMIN — ACETAMINOPHEN 650 MG: 325 TABLET ORAL at 14:28

## 2021-01-01 RX ADMIN — EZETIMIBE 10 MG: 10 TABLET ORAL at 08:25

## 2021-01-01 RX ADMIN — EZETIMIBE 10 MG: 10 TABLET ORAL at 09:00

## 2021-01-01 RX ADMIN — CALCIUM CARBONATE (ANTACID) CHEW TAB 500 MG 200 MG: 500 CHEW TAB at 19:25

## 2021-01-01 RX ADMIN — METOPROLOL TARTRATE 50 MG: 50 TABLET, FILM COATED ORAL at 09:01

## 2021-01-01 RX ADMIN — FLUCONAZOLE 200 MG: 100 TABLET ORAL at 10:22

## 2021-01-01 RX ADMIN — ENOXAPARIN SODIUM 60 MG: 30 INJECTION SUBCUTANEOUS at 09:02

## 2021-01-01 RX ADMIN — POTASSIUM CHLORIDE 10 MEQ: 750 TABLET, FILM COATED, EXTENDED RELEASE ORAL at 10:07

## 2021-01-01 RX ADMIN — METOPROLOL TARTRATE 25 MG: 25 TABLET, FILM COATED ORAL at 08:49

## 2021-01-01 RX ADMIN — CALCIUM CARBONATE (ANTACID) CHEW TAB 500 MG 200 MG: 500 CHEW TAB at 19:36

## 2021-01-01 RX ADMIN — VANCOMYCIN HYDROCHLORIDE 1250 MG: 10 INJECTION, POWDER, LYOPHILIZED, FOR SOLUTION INTRAVENOUS at 10:00

## 2021-01-01 RX ADMIN — SODIUM CHLORIDE 10 ML: 9 INJECTION, SOLUTION INTRAMUSCULAR; INTRAVENOUS; SUBCUTANEOUS at 06:10

## 2021-01-01 RX ADMIN — HYDROCHLOROTHIAZIDE 12.5 MG: 25 TABLET ORAL at 14:49

## 2021-01-01 RX ADMIN — ATORVASTATIN CALCIUM 20 MG: 20 TABLET, FILM COATED ORAL at 21:36

## 2021-01-01 RX ADMIN — PANTOPRAZOLE SODIUM 40 MG: 40 TABLET, DELAYED RELEASE ORAL at 06:44

## 2021-01-01 RX ADMIN — FAMOTIDINE 20 MG: 10 INJECTION, SOLUTION INTRAVENOUS at 21:36

## 2021-01-01 RX ADMIN — HYDROMORPHONE HYDROCHLORIDE 0.5 MG: 1 INJECTION, SOLUTION INTRAMUSCULAR; INTRAVENOUS; SUBCUTANEOUS at 07:01

## 2021-01-01 RX ADMIN — HYDROCODONE BITARTRATE AND ACETAMINOPHEN 1 TABLET: 5; 325 TABLET ORAL at 13:31

## 2021-01-01 RX ADMIN — POTASSIUM CHLORIDE, DEXTROSE MONOHYDRATE AND SODIUM CHLORIDE 100 ML/HR: 150; 5; 450 INJECTION, SOLUTION INTRAVENOUS at 01:33

## 2021-01-01 RX ADMIN — EZETIMIBE 10 MG: 10 TABLET ORAL at 09:27

## 2021-01-01 RX ADMIN — ENOXAPARIN SODIUM 60 MG: 30 INJECTION SUBCUTANEOUS at 10:02

## 2021-01-01 RX ADMIN — FAMOTIDINE 20 MG: 10 INJECTION, SOLUTION INTRAVENOUS at 09:29

## 2021-01-01 RX ADMIN — ASPIRIN 81 MG: 81 TABLET, COATED ORAL at 08:17

## 2021-01-01 RX ADMIN — DOXAZOSIN 1 MG: 1 TABLET ORAL at 08:49

## 2021-01-01 RX ADMIN — FAMOTIDINE 20 MG: 10 INJECTION, SOLUTION INTRAVENOUS at 22:28

## 2021-01-01 RX ADMIN — SODIUM CHLORIDE 1000 ML: 9 INJECTION, SOLUTION INTRAVENOUS at 22:30

## 2021-01-01 RX ADMIN — PRAVASTATIN SODIUM 20 MG: 20 TABLET ORAL at 21:42

## 2021-01-01 RX ADMIN — SODIUM CHLORIDE 1000 ML: 9 INJECTION, SOLUTION INTRAVENOUS at 21:46

## 2021-01-01 RX ADMIN — POTASSIUM CHLORIDE AND SODIUM CHLORIDE: 900; 150 INJECTION, SOLUTION INTRAVENOUS at 02:56

## 2021-01-01 RX ADMIN — SODIUM CHLORIDE 10 ML: 9 INJECTION, SOLUTION INTRAMUSCULAR; INTRAVENOUS; SUBCUTANEOUS at 17:15

## 2021-01-01 RX ADMIN — SODIUM CHLORIDE 10 ML: 9 INJECTION, SOLUTION INTRAMUSCULAR; INTRAVENOUS; SUBCUTANEOUS at 21:43

## 2021-01-01 RX ADMIN — ONDANSETRON 2 MG: 2 INJECTION INTRAMUSCULAR; INTRAVENOUS at 01:03

## 2021-01-01 RX ADMIN — ENOXAPARIN SODIUM 60 MG: 30 INJECTION SUBCUTANEOUS at 14:19

## 2021-01-01 RX ADMIN — HYDROCHLOROTHIAZIDE 25 MG: 25 TABLET ORAL at 09:27

## 2021-01-01 RX ADMIN — PRAVASTATIN SODIUM 20 MG: 20 TABLET ORAL at 21:31

## 2021-01-01 RX ADMIN — ACETAMINOPHEN 650 MG: 325 TABLET ORAL at 14:08

## 2021-01-01 RX ADMIN — LIDOCAINE HYDROCHLORIDE 60 MG: 20 INJECTION, SOLUTION EPIDURAL; INFILTRATION; INTRACAUDAL; PERINEURAL at 12:54

## 2021-01-01 RX ADMIN — ATORVASTATIN CALCIUM 20 MG: 20 TABLET, FILM COATED ORAL at 21:27

## 2021-01-01 RX ADMIN — HYDRALAZINE HYDROCHLORIDE 20 MG: 20 INJECTION INTRAMUSCULAR; INTRAVENOUS at 13:29

## 2021-01-01 RX ADMIN — SODIUM CHLORIDE, POTASSIUM CHLORIDE, SODIUM LACTATE AND CALCIUM CHLORIDE 125 ML/HR: 600; 310; 30; 20 INJECTION, SOLUTION INTRAVENOUS at 11:40

## 2021-01-01 RX ADMIN — DOXAZOSIN 2 MG: 2 TABLET ORAL at 10:00

## 2021-01-01 RX ADMIN — DOXAZOSIN 1 MG: 1 TABLET ORAL at 08:42

## 2021-01-01 RX ADMIN — CARVEDILOL 6.25 MG: 6.25 TABLET, FILM COATED ORAL at 08:26

## 2021-01-01 RX ADMIN — Medication 10 ML: at 05:03

## 2021-01-01 RX ADMIN — ASPIRIN 81 MG: 81 TABLET, CHEWABLE ORAL at 09:05

## 2021-01-01 RX ADMIN — PANTOPRAZOLE SODIUM 40 MG: 40 TABLET, DELAYED RELEASE ORAL at 07:01

## 2021-01-01 RX ADMIN — SODIUM CHLORIDE 10 ML: 9 INJECTION, SOLUTION INTRAMUSCULAR; INTRAVENOUS; SUBCUTANEOUS at 21:13

## 2021-01-01 RX ADMIN — FENTANYL CITRATE 25 MCG: 50 INJECTION, SOLUTION INTRAMUSCULAR; INTRAVENOUS at 14:25

## 2021-01-01 RX ADMIN — SUCCINYLCHOLINE CHLORIDE 100 MG: 20 INJECTION, SOLUTION INTRAMUSCULAR; INTRAVENOUS at 12:56

## 2021-01-01 RX ADMIN — PRAVASTATIN SODIUM 20 MG: 20 TABLET ORAL at 21:46

## 2021-01-01 RX ADMIN — OXYCODONE HYDROCHLORIDE AND ACETAMINOPHEN 1 TABLET: 5; 325 TABLET ORAL at 13:51

## 2021-01-01 RX ADMIN — METOPROLOL TARTRATE 50 MG: 50 TABLET, FILM COATED ORAL at 08:45

## 2021-01-01 RX ADMIN — CALCIUM CARBONATE (ANTACID) CHEW TAB 500 MG 200 MG: 500 CHEW TAB at 06:42

## 2021-01-01 RX ADMIN — POTASSIUM CHLORIDE 10 MEQ: 750 TABLET, FILM COATED, EXTENDED RELEASE ORAL at 08:26

## 2021-01-01 RX ADMIN — ACETAMINOPHEN 650 MG: 325 TABLET ORAL at 04:03

## 2021-01-01 RX ADMIN — PANTOPRAZOLE SODIUM 40 MG: 40 TABLET, DELAYED RELEASE ORAL at 16:12

## 2021-01-01 RX ADMIN — IBUPROFEN 400 MG: 400 TABLET, FILM COATED ORAL at 21:40

## 2021-01-01 RX ADMIN — OXYCODONE HYDROCHLORIDE AND ACETAMINOPHEN 1 TABLET: 5; 325 TABLET ORAL at 14:31

## 2021-01-01 RX ADMIN — CLOTRIMAZOLE AND BETAMETHASONE DIPROPIONATE: 10; .5 CREAM TOPICAL at 08:18

## 2021-01-01 RX ADMIN — FENTANYL CITRATE 50 MCG: 50 INJECTION, SOLUTION INTRAMUSCULAR; INTRAVENOUS at 12:54

## 2021-01-01 RX ADMIN — MAGNESIUM OXIDE 400 MG (241.3 MG MAGNESIUM) TABLET 400 MG: TABLET at 08:57

## 2021-01-01 RX ADMIN — EZETIMIBE 10 MG: 10 TABLET ORAL at 10:09

## 2021-01-01 RX ADMIN — CALCIUM CARBONATE (ANTACID) CHEW TAB 500 MG 200 MG: 500 CHEW TAB at 12:28

## 2021-01-01 RX ADMIN — MAGNESIUM OXIDE 400 MG (241.3 MG MAGNESIUM) TABLET 400 MG: TABLET at 09:05

## 2021-01-01 RX ADMIN — POTASSIUM CHLORIDE 10 MEQ: 7.46 INJECTION, SOLUTION INTRAVENOUS at 01:23

## 2021-01-01 RX ADMIN — ONDANSETRON 2 MG: 2 INJECTION INTRAMUSCULAR; INTRAVENOUS at 13:36

## 2021-01-01 RX ADMIN — CARVEDILOL 6.25 MG: 6.25 TABLET, FILM COATED ORAL at 10:09

## 2021-01-01 RX ADMIN — LEVOFLOXACIN 500 MG: 5 INJECTION, SOLUTION INTRAVENOUS at 13:01

## 2021-01-01 RX ADMIN — FAMOTIDINE 20 MG: 10 INJECTION, SOLUTION INTRAVENOUS at 21:27

## 2021-01-01 RX ADMIN — SODIUM CHLORIDE 10 ML: 9 INJECTION, SOLUTION INTRAMUSCULAR; INTRAVENOUS; SUBCUTANEOUS at 05:27

## 2021-01-01 RX ADMIN — POTASSIUM CHLORIDE 10 MEQ: 750 TABLET, FILM COATED, EXTENDED RELEASE ORAL at 17:12

## 2021-01-01 RX ADMIN — CALCIUM CARBONATE (ANTACID) CHEW TAB 500 MG 200 MG: 500 CHEW TAB at 18:08

## 2021-01-01 RX ADMIN — LEVOFLOXACIN 750 MG: 5 INJECTION, SOLUTION INTRAVENOUS at 05:52

## 2021-01-01 RX ADMIN — EZETIMIBE 10 MG: 10 TABLET ORAL at 09:28

## 2021-01-01 RX ADMIN — ACETAMINOPHEN 650 MG: 325 TABLET ORAL at 19:54

## 2021-01-01 RX ADMIN — MAGNESIUM OXIDE 400 MG (241.3 MG MAGNESIUM) TABLET 400 MG: TABLET at 08:53

## 2021-01-01 RX ADMIN — ENOXAPARIN SODIUM 40 MG: 40 INJECTION SUBCUTANEOUS at 08:53

## 2021-01-01 RX ADMIN — PANTOPRAZOLE SODIUM 40 MG: 40 TABLET, DELAYED RELEASE ORAL at 18:08

## 2021-01-01 RX ADMIN — EZETIMIBE 10 MG: 10 TABLET ORAL at 09:25

## 2021-01-01 RX ADMIN — SODIUM CHLORIDE 10 ML: 9 INJECTION, SOLUTION INTRAMUSCULAR; INTRAVENOUS; SUBCUTANEOUS at 03:01

## 2021-01-01 RX ADMIN — ROCURONIUM BROMIDE 5 MG: 10 SOLUTION INTRAVENOUS at 12:54

## 2021-01-01 RX ADMIN — PANTOPRAZOLE SODIUM 40 MG: 40 TABLET, DELAYED RELEASE ORAL at 06:33

## 2021-01-01 RX ADMIN — CALCIUM CARBONATE (ANTACID) CHEW TAB 500 MG 200 MG: 500 CHEW TAB at 11:09

## 2021-01-01 RX ADMIN — FAMOTIDINE 20 MG: 10 INJECTION, SOLUTION INTRAVENOUS at 21:42

## 2021-01-01 RX ADMIN — METOPROLOL TARTRATE 50 MG: 50 TABLET, FILM COATED ORAL at 17:20

## 2021-01-01 RX ADMIN — IBUPROFEN 400 MG: 400 TABLET, FILM COATED ORAL at 17:21

## 2021-01-01 RX ADMIN — OXYCODONE HYDROCHLORIDE AND ACETAMINOPHEN 1 TABLET: 5; 325 TABLET ORAL at 02:05

## 2021-01-01 RX ADMIN — OXYCODONE HYDROCHLORIDE AND ACETAMINOPHEN 1 TABLET: 5; 325 TABLET ORAL at 12:51

## 2021-01-01 RX ADMIN — EZETIMIBE 10 MG: 10 TABLET ORAL at 09:32

## 2021-01-01 RX ADMIN — POTASSIUM CHLORIDE 10 MEQ: 750 TABLET, FILM COATED, EXTENDED RELEASE ORAL at 17:23

## 2021-01-01 RX ADMIN — OXYCODONE HYDROCHLORIDE AND ACETAMINOPHEN 1 TABLET: 5; 325 TABLET ORAL at 09:10

## 2021-01-01 RX ADMIN — SODIUM CHLORIDE 20 ML: 9 INJECTION, SOLUTION INTRAMUSCULAR; INTRAVENOUS; SUBCUTANEOUS at 21:36

## 2021-01-01 RX ADMIN — FUROSEMIDE 20 MG: 20 TABLET ORAL at 10:09

## 2021-01-01 RX ADMIN — ASPIRIN 81 MG: 81 TABLET, CHEWABLE ORAL at 09:31

## 2021-01-01 RX ADMIN — OXYCODONE HYDROCHLORIDE AND ACETAMINOPHEN 1 TABLET: 5; 325 TABLET ORAL at 17:43

## 2021-01-01 RX ADMIN — KETAMINE HYDROCHLORIDE 20 MG: 10 INJECTION, SOLUTION INTRAMUSCULAR; INTRAVENOUS at 12:54

## 2021-01-01 RX ADMIN — ONDANSETRON 2 MG: 2 INJECTION INTRAMUSCULAR; INTRAVENOUS at 09:39

## 2021-01-01 RX ADMIN — SODIUM CHLORIDE 5 ML: 9 INJECTION, SOLUTION INTRAMUSCULAR; INTRAVENOUS; SUBCUTANEOUS at 21:49

## 2021-01-01 RX ADMIN — POTASSIUM CHLORIDE 10 MEQ: 750 TABLET, FILM COATED, EXTENDED RELEASE ORAL at 09:32

## 2021-01-01 RX ADMIN — HYDROCODONE BITARTRATE AND ACETAMINOPHEN 1 TABLET: 5; 325 TABLET ORAL at 21:47

## 2021-01-01 RX ADMIN — Medication 10 ML: at 13:52

## 2021-01-01 RX ADMIN — OXYCODONE HYDROCHLORIDE AND ACETAMINOPHEN 1 TABLET: 5; 325 TABLET ORAL at 10:00

## 2021-01-01 RX ADMIN — DOXAZOSIN 2 MG: 2 TABLET ORAL at 14:20

## 2021-01-01 RX ADMIN — DOXAZOSIN 2 MG: 2 TABLET ORAL at 09:19

## 2021-01-01 RX ADMIN — HYDROCHLOROTHIAZIDE 12.5 MG: 25 TABLET ORAL at 09:31

## 2021-01-01 RX ADMIN — CALCIUM CARBONATE (ANTACID) CHEW TAB 500 MG 200 MG: 500 CHEW TAB at 17:20

## 2021-01-01 RX ADMIN — ASPIRIN 81 MG: 81 TABLET, COATED ORAL at 09:19

## 2021-01-01 RX ADMIN — CLOTRIMAZOLE AND BETAMETHASONE DIPROPIONATE: 10; .5 CREAM TOPICAL at 17:20

## 2021-01-01 RX ADMIN — ENOXAPARIN SODIUM 60 MG: 30 INJECTION SUBCUTANEOUS at 09:28

## 2021-01-01 RX ADMIN — ASPIRIN 81 MG: 81 TABLET, CHEWABLE ORAL at 08:53

## 2021-01-01 RX ADMIN — DIPHENHYDRAMINE HYDROCHLORIDE 25 MG: 50 INJECTION, SOLUTION INTRAMUSCULAR; INTRAVENOUS at 22:21

## 2021-01-01 RX ADMIN — EZETIMIBE 10 MG: 10 TABLET ORAL at 08:45

## 2021-01-01 RX ADMIN — SODIUM CHLORIDE 10 ML: 9 INJECTION, SOLUTION INTRAMUSCULAR; INTRAVENOUS; SUBCUTANEOUS at 16:12

## 2021-01-01 RX ADMIN — METOPROLOL TARTRATE 50 MG: 50 TABLET, FILM COATED ORAL at 09:04

## 2021-01-01 RX ADMIN — HYDROCODONE BITARTRATE AND ACETAMINOPHEN 1 TABLET: 5; 325 TABLET ORAL at 09:17

## 2021-01-01 RX ADMIN — IBUPROFEN 400 MG: 400 TABLET, FILM COATED ORAL at 09:05

## 2021-01-01 RX ADMIN — EZETIMIBE 10 MG: 10 TABLET ORAL at 14:20

## 2021-01-01 RX ADMIN — FAMOTIDINE 20 MG: 10 INJECTION, SOLUTION INTRAVENOUS at 08:53

## 2021-01-01 RX ADMIN — OXYCODONE HYDROCHLORIDE AND ACETAMINOPHEN 1 TABLET: 5; 325 TABLET ORAL at 20:04

## 2021-01-01 RX ADMIN — METHYLPREDNISOLONE SODIUM SUCCINATE 125 MG: 125 INJECTION, POWDER, FOR SOLUTION INTRAMUSCULAR; INTRAVENOUS at 21:43

## 2021-01-01 RX ADMIN — SODIUM CHLORIDE 500 ML: 9 INJECTION, SOLUTION INTRAVENOUS at 07:34

## 2021-01-01 RX ADMIN — DOXAZOSIN 2 MG: 2 TABLET ORAL at 09:00

## 2021-01-01 RX ADMIN — DEXAMETHASONE SODIUM PHOSPHATE 4 MG: 4 INJECTION, SOLUTION INTRAMUSCULAR; INTRAVENOUS at 12:56

## 2021-01-01 RX ADMIN — PANTOPRAZOLE SODIUM 40 MG: 40 TABLET, DELAYED RELEASE ORAL at 06:10

## 2021-01-01 RX ADMIN — MORPHINE SULFATE 5 MG: 20 SOLUTION ORAL at 10:25

## 2021-01-01 RX ADMIN — ACETAMINOPHEN 650 MG: 650 SUPPOSITORY RECTAL at 06:47

## 2021-01-01 RX ADMIN — SODIUM CHLORIDE 1000 ML: 9 INJECTION, SOLUTION INTRAVENOUS at 06:15

## 2021-01-01 RX ADMIN — FAMOTIDINE 20 MG: 10 INJECTION, SOLUTION INTRAVENOUS at 09:26

## 2021-01-01 RX ADMIN — ASPIRIN 81 MG: 81 TABLET, COATED ORAL at 09:04

## 2021-01-01 RX ADMIN — SODIUM CHLORIDE, POTASSIUM CHLORIDE, SODIUM LACTATE AND CALCIUM CHLORIDE: 600; 310; 30; 20 INJECTION, SOLUTION INTRAVENOUS at 12:45

## 2021-01-01 RX ADMIN — ONDANSETRON 2 MG: 2 INJECTION INTRAMUSCULAR; INTRAVENOUS at 09:21

## 2021-01-01 RX ADMIN — ASPIRIN 81 MG: 81 TABLET, COATED ORAL at 14:20

## 2021-01-01 RX ADMIN — ONDANSETRON 4 MG: 2 INJECTION INTRAMUSCULAR; INTRAVENOUS at 13:30

## 2021-01-01 RX ADMIN — ACETAMINOPHEN 650 MG: 325 TABLET ORAL at 09:12

## 2021-01-01 RX ADMIN — METOPROLOL TARTRATE 25 MG: 25 TABLET, FILM COATED ORAL at 17:21

## 2021-01-01 RX ADMIN — CARVEDILOL 6.25 MG: 6.25 TABLET, FILM COATED ORAL at 17:14

## 2021-01-01 RX ADMIN — PHENYLEPHRINE HYDROCHLORIDE 40 MCG/MIN: 10 INJECTION INTRAVENOUS at 13:06

## 2021-01-01 RX ADMIN — FUROSEMIDE 20 MG: 20 TABLET ORAL at 09:05

## 2021-01-01 RX ADMIN — ENOXAPARIN SODIUM 60 MG: 30 INJECTION SUBCUTANEOUS at 21:31

## 2021-01-01 RX ADMIN — EZETIMIBE 10 MG: 10 TABLET ORAL at 08:17

## 2021-01-01 RX ADMIN — MORPHINE SULFATE 5 MG: 20 SOLUTION ORAL at 10:41

## 2021-01-01 RX ADMIN — DOXAZOSIN 1 MG: 2 TABLET ORAL at 10:06

## 2021-01-01 RX ADMIN — PANTOPRAZOLE SODIUM 40 MG: 40 TABLET, DELAYED RELEASE ORAL at 10:07

## 2021-01-01 RX ADMIN — VANCOMYCIN HYDROCHLORIDE 1500 MG: 10 INJECTION, POWDER, LYOPHILIZED, FOR SOLUTION INTRAVENOUS at 10:20

## 2021-01-01 RX ADMIN — ASPIRIN 81 MG: 81 TABLET, COATED ORAL at 10:02

## 2021-01-01 RX ADMIN — METOPROLOL TARTRATE 50 MG: 50 TABLET, FILM COATED ORAL at 17:40

## 2021-01-01 RX ADMIN — METOPROLOL TARTRATE 50 MG: 50 TABLET, FILM COATED ORAL at 19:24

## 2021-01-01 RX ADMIN — ENOXAPARIN SODIUM 60 MG: 40 INJECTION SUBCUTANEOUS at 20:37

## 2021-01-01 RX ADMIN — ENOXAPARIN SODIUM 60 MG: 30 INJECTION SUBCUTANEOUS at 23:32

## 2021-01-01 RX ADMIN — ENOXAPARIN SODIUM 60 MG: 30 INJECTION SUBCUTANEOUS at 21:46

## 2021-01-01 RX ADMIN — FENTANYL CITRATE 25 MCG: 50 INJECTION, SOLUTION INTRAMUSCULAR; INTRAVENOUS at 15:10

## 2021-01-01 RX ADMIN — LEVOFLOXACIN 500 MG: 5 INJECTION, SOLUTION INTRAVENOUS at 01:24

## 2021-01-01 RX ADMIN — ENOXAPARIN SODIUM 60 MG: 30 INJECTION SUBCUTANEOUS at 23:01

## 2021-01-01 RX ADMIN — DIPHENHYDRAMINE HYDROCHLORIDE 12.5 MG: 50 INJECTION, SOLUTION INTRAMUSCULAR; INTRAVENOUS at 12:09

## 2021-01-01 RX ADMIN — FAMOTIDINE 20 MG: 10 INJECTION, SOLUTION INTRAVENOUS at 22:21

## 2021-01-01 RX ADMIN — CALCIUM CARBONATE (ANTACID) CHEW TAB 500 MG 200 MG: 500 CHEW TAB at 07:06

## 2021-01-01 RX ADMIN — ENOXAPARIN SODIUM 40 MG: 40 INJECTION SUBCUTANEOUS at 09:28

## 2021-01-01 RX ADMIN — ONDANSETRON 2 MG: 2 INJECTION INTRAMUSCULAR; INTRAVENOUS at 09:09

## 2021-01-01 RX ADMIN — SODIUM CHLORIDE 10 ML: 9 INJECTION, SOLUTION INTRAMUSCULAR; INTRAVENOUS; SUBCUTANEOUS at 06:33

## 2021-01-01 RX ADMIN — OXYCODONE HYDROCHLORIDE AND ACETAMINOPHEN 1 TABLET: 5; 325 TABLET ORAL at 10:45

## 2021-01-01 RX ADMIN — ATORVASTATIN CALCIUM 20 MG: 20 TABLET, FILM COATED ORAL at 22:26

## 2021-01-01 RX ADMIN — POTASSIUM CHLORIDE 10 MEQ: 750 TABLET, FILM COATED, EXTENDED RELEASE ORAL at 08:52

## 2021-01-01 RX ADMIN — OXYCODONE HYDROCHLORIDE AND ACETAMINOPHEN 1 TABLET: 5; 325 TABLET ORAL at 18:55

## 2021-01-01 RX ADMIN — EZETIMIBE 10 MG: 10 TABLET ORAL at 09:04

## 2021-01-01 RX ADMIN — ATORVASTATIN CALCIUM 20 MG: 20 TABLET, FILM COATED ORAL at 21:12

## 2021-01-01 RX ADMIN — EZETIMIBE 10 MG: 10 TABLET ORAL at 08:52

## 2021-01-01 RX ADMIN — OXYCODONE HYDROCHLORIDE AND ACETAMINOPHEN 1 TABLET: 5; 325 TABLET ORAL at 08:22

## 2021-01-01 RX ADMIN — ACETAMINOPHEN 650 MG: 325 TABLET ORAL at 21:43

## 2021-01-01 RX ADMIN — SODIUM CHLORIDE 40 ML: 9 INJECTION, SOLUTION INTRAMUSCULAR; INTRAVENOUS; SUBCUTANEOUS at 22:27

## 2021-01-01 RX ADMIN — MAGNESIUM OXIDE 400 MG (241.3 MG MAGNESIUM) TABLET 400 MG: TABLET at 10:07

## 2021-01-01 RX ADMIN — CARVEDILOL 6.25 MG: 6.25 TABLET, FILM COATED ORAL at 09:05

## 2021-01-01 RX ADMIN — PANTOPRAZOLE SODIUM 40 MG: 40 TABLET, DELAYED RELEASE ORAL at 18:23

## 2021-01-01 RX ADMIN — ONDANSETRON 2 MG: 2 INJECTION INTRAMUSCULAR; INTRAVENOUS at 09:15

## 2021-01-01 RX ADMIN — EZETIMIBE 10 MG: 10 TABLET ORAL at 10:06

## 2021-01-01 RX ADMIN — Medication 10 ML: at 13:37

## 2021-01-01 RX ADMIN — ENOXAPARIN SODIUM 60 MG: 30 INJECTION SUBCUTANEOUS at 22:33

## 2021-01-01 RX ADMIN — OXYCODONE HYDROCHLORIDE AND ACETAMINOPHEN 1 TABLET: 5; 325 TABLET ORAL at 15:07

## 2021-01-01 RX ADMIN — EPINEPHRINE 0.3 MG: 0.1 INJECTION INTRACARDIAC; INTRAVENOUS at 22:01

## 2021-01-01 RX ADMIN — POTASSIUM CHLORIDE 10 MEQ: 750 TABLET, FILM COATED, EXTENDED RELEASE ORAL at 17:21

## 2021-01-01 RX ADMIN — CALCIUM CARBONATE (ANTACID) CHEW TAB 500 MG 200 MG: 500 CHEW TAB at 08:17

## 2021-01-01 RX ADMIN — ALBUMIN (HUMAN) 250 ML: 12.5 INJECTION, SOLUTION INTRAVENOUS at 13:55

## 2021-01-01 RX ADMIN — POTASSIUM CHLORIDE 10 MEQ: 750 TABLET, FILM COATED, EXTENDED RELEASE ORAL at 09:28

## 2021-01-01 RX ADMIN — METOPROLOL TARTRATE 50 MG: 50 TABLET, FILM COATED ORAL at 17:49

## 2021-01-01 RX ADMIN — FENTANYL CITRATE 25 MCG: 50 INJECTION, SOLUTION INTRAMUSCULAR; INTRAVENOUS at 15:15

## 2021-01-01 RX ADMIN — DOXAZOSIN 2 MG: 2 TABLET ORAL at 08:45

## 2021-01-01 RX ADMIN — ROCURONIUM BROMIDE 20 MG: 10 SOLUTION INTRAVENOUS at 13:32

## 2021-01-01 RX ADMIN — ENOXAPARIN SODIUM 60 MG: 30 INJECTION SUBCUTANEOUS at 09:29

## 2021-01-01 RX ADMIN — Medication 10 ML: at 14:13

## 2021-01-01 RX ADMIN — ASPIRIN 81 MG: 81 TABLET, COATED ORAL at 10:00

## 2021-01-01 RX ADMIN — ASPIRIN 81 MG: 81 TABLET, COATED ORAL at 08:45

## 2021-01-01 RX ADMIN — OXYCODONE HYDROCHLORIDE AND ACETAMINOPHEN 1 TABLET: 5; 325 TABLET ORAL at 07:40

## 2021-01-01 RX ADMIN — METOPROLOL TARTRATE 50 MG: 50 TABLET, FILM COATED ORAL at 14:20

## 2021-01-01 RX ADMIN — HYDROCODONE BITARTRATE AND ACETAMINOPHEN 1 TABLET: 5; 325 TABLET ORAL at 14:54

## 2021-01-01 RX ADMIN — OXYCODONE HYDROCHLORIDE AND ACETAMINOPHEN 1 TABLET: 5; 325 TABLET ORAL at 21:42

## 2021-01-01 RX ADMIN — ATORVASTATIN CALCIUM 20 MG: 20 TABLET, FILM COATED ORAL at 21:13

## 2021-01-01 RX ADMIN — ENOXAPARIN SODIUM 60 MG: 30 INJECTION SUBCUTANEOUS at 10:53

## 2021-01-01 RX ADMIN — CLOTRIMAZOLE AND BETAMETHASONE DIPROPIONATE: 10; .5 CREAM TOPICAL at 10:22

## 2021-01-01 RX ADMIN — SODIUM CHLORIDE 10 ML: 9 INJECTION, SOLUTION INTRAMUSCULAR; INTRAVENOUS; SUBCUTANEOUS at 13:05

## 2021-01-01 RX ADMIN — POTASSIUM CHLORIDE 10 MEQ: 750 TABLET, FILM COATED, EXTENDED RELEASE ORAL at 08:56

## 2021-01-01 RX ADMIN — PRAVASTATIN SODIUM 20 MG: 20 TABLET ORAL at 22:39

## 2021-01-01 RX ADMIN — ENOXAPARIN SODIUM 60 MG: 30 INJECTION SUBCUTANEOUS at 21:33

## 2021-01-01 RX ADMIN — METOPROLOL TARTRATE 50 MG: 50 TABLET, FILM COATED ORAL at 08:17

## 2021-01-01 RX ADMIN — OXYCODONE HYDROCHLORIDE AND ACETAMINOPHEN 1 TABLET: 5; 325 TABLET ORAL at 20:39

## 2021-01-01 RX ADMIN — HYDRALAZINE HYDROCHLORIDE 20 MG: 20 INJECTION INTRAMUSCULAR; INTRAVENOUS at 23:34

## 2021-01-01 RX ADMIN — WATER 10 MG: 1 INJECTION INTRAMUSCULAR; INTRAVENOUS; SUBCUTANEOUS at 04:25

## 2021-01-01 RX ADMIN — METOPROLOL TARTRATE 25 MG: 25 TABLET, FILM COATED ORAL at 09:32

## 2021-01-01 RX ADMIN — HYDROCODONE BITARTRATE AND ACETAMINOPHEN 1 TABLET: 5; 325 TABLET ORAL at 19:51

## 2021-01-01 RX ADMIN — PRAVASTATIN SODIUM 20 MG: 20 TABLET ORAL at 23:01

## 2021-01-01 RX ADMIN — CALCIUM CARBONATE (ANTACID) CHEW TAB 500 MG 200 MG: 500 CHEW TAB at 17:40

## 2021-01-01 RX ADMIN — HYDROCHLOROTHIAZIDE 12.5 MG: 25 TABLET ORAL at 10:05

## 2021-01-01 RX ADMIN — METOPROLOL TARTRATE 50 MG: 50 TABLET, FILM COATED ORAL at 09:19

## 2021-01-01 RX ADMIN — IOPAMIDOL 100 ML: 755 INJECTION, SOLUTION INTRAVENOUS at 17:02

## 2021-01-01 RX ADMIN — CALCIUM CARBONATE (ANTACID) CHEW TAB 500 MG 200 MG: 500 CHEW TAB at 07:23

## 2021-01-01 RX ADMIN — CALCIUM CARBONATE (ANTACID) CHEW TAB 500 MG 200 MG: 500 CHEW TAB at 11:44

## 2021-01-01 RX ADMIN — DOXAZOSIN 1 MG: 2 TABLET ORAL at 08:57

## 2021-01-01 RX ADMIN — ENOXAPARIN SODIUM 60 MG: 30 INJECTION SUBCUTANEOUS at 10:11

## 2021-01-01 RX ADMIN — ENOXAPARIN SODIUM 60 MG: 30 INJECTION SUBCUTANEOUS at 22:39

## 2021-03-24 PROBLEM — R59.0 AXILLARY ADENOPATHY: Status: ACTIVE | Noted: 2021-01-01

## 2021-03-24 NOTE — PROGRESS NOTES
Subjective:  
  
Tracie Mullins  is a 78 y.o. female presents for evaluation of RIGHT axillary LN. Pt had PET/CT scan on 03/10/21 that showed hypermetabolic RIGHT axillary LN. Pt reports she recently fell and injured her RIGHT shoulder/axilla, making it difficult for her to raise her arm. Pt denies any fevers or night sweats. Pt currently using a wheelchair. HISTORY:  
 
Pt had RIGHT upper back shave biopsy on 05/14/19 with Dr. Shamika Garsia with PATH demonstrating malignant melanoma, 1.98 mm thick, ulceration present, pT2b. She underwent RIGHT upper back melanoma excision and SLNBx with Dr. Js Womack on 06/13/19. Final surgical PATH: no residual melanocytic proliferation, 0/1 RIGHT axillary LNs involved  
  
Pt had shave biopsy of same area in the RIGHT upper back on 06/17/20 with PATH showing malignant melanoma in the superficial dermis, consistent with local recurrence/metastasis. 
  
Pt had PET/CT scan on 07/14/20 that did not show any foci of abnormal hypermetabolism. Pt underwent wide excision recurrent melanoma of back on 10/12/20. Final surgical PATH: 0.7 x 0.5 x 0.3 cm clinically recurrent malignant melanoma, surgical margins negative, pT3a pNX.  
   
Pt also has history of RIGHT breast cancer. 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 She had recurrence in 2014 with PATH showing IDC (ER+/DC+/HER2-). This was treated with mastectomy and ALND. Pt continues on AI and and is followed by Dr. Lilly Rodriguez for medical oncology care. Past Medical History:  
Diagnosis Date  Arrhythmia LBBB  Arthritis SPINE  Axillary adenopathy 3/24/2021  Calculus of kidney 1990  Cancer ([facility] [address] right breast X2 ; BCCA  Environmental allergies  GERD (gastroesophageal reflux disease)  Hypercholesterolemia  Hypertension  Nausea & vomiting  Other ill-defined conditions(799.89)   
 high cholesterol  Other ill-defined conditions(799.89) NO BP/VENIPUNCTURES RIGHT ARM (POST LYMPH NODE DISSECTION)  Other ill-defined conditions(799.89) MITRAL VALVE PROLAPSE  Recurrent malignant melanoma of skin ([address] 9/23/2020  Stroke ([facility] [address] TIA 2009  (TAKING ASA) Past Surgical History:  
Procedure Laterality Date  HX BACK SURGERY  1998  
 l4-l5  ([facility]) Harpreet Valderrama  
 right  HX BREAST RECONSTRUCTION Right 8/27/2014 Revision Right Reconstructed Breast performed by Diane Wilcox MD at [address] [place] [address] HX BREAST RECONSTRUCTION Bilateral 2/20/2015 REVISION RIGHT RECONSTRUCTED BREAST/REMOVE TISSUE EXPANDERS/PLACE IMPLANT RIGHT/MASTOPEXY LEFT performed by Diane Wilcox MD at [address] [place] [address] HX CATARACT REMOVAL    
 BILATERAL  
 HX DILATION AND CURETTAGE    
 HX GI  2006 COLONOSCOPY  
 HX LITHOTRIPSY  1991  
 HX MALIGNANT SKIN LESION EXCISION  10/12/2020 Wide excision of recurrent melanoma of the back  HX MASTECTOMY Right 2014  HX ORTHOPAEDIC  2005  
 left bunionectomy  HX MINAL AND BSO  HX TUBAL LIGATION    
 HX UROLOGICAL    
 kidney stone  HX UROLOGICAL  2016 BLADDER SLING Social History Tobacco Use  Smoking status: Never Smoker  Smokeless tobacco: Never Used Substance Use Topics  Alcohol use: No  
 
 
Family History Problem Relation Age of Onset  Heart Disease Mother  Heart Disease Brother  Cancer Brother PROSTATE  Cancer Father LUNG--ASBESTOS EXPOSURE  
 Heart Disease Maternal Aunt  Heart Disease Brother  Anesth Problems Neg Hx Current Outpatient Medications on File Prior to Visit Medication Sig Dispense Refill  ZINC PO Take  by mouth.  cholecalciferol, vitamin D3, (VITAMIN D3 PO) Take  by mouth.  pravastatin (PRAVACHOL) 40 mg tablet Take 40 mg by mouth nightly.     
 potassium chloride SA (MICRO-K) 10 mEq capsule Take 10 mEq by mouth two (2) times a day.  OTHER PREVAGEN DAILY  ezetimibe (Zetia) 10 mg tablet Take 10 mg by mouth daily.  atorvastatin (LIPITOR) 20 mg tablet Take 20 mg by mouth nightly.  doxazosin (CARDURA) 1 mg tablet Take  by mouth daily.  MULTIVITAMIN PO Take  by mouth daily.  B.infantis-B.ani-B.long-B.bifi (PROBIOTIC 4X) 10-15 mg TbEC Take  by mouth daily.  hydrochlorothiazide (HYDRODIURIL) 25 mg tablet Take 12.5 mg by mouth daily.  metoprolol (LOPRESSOR) 50 mg tablet Take 1 Tab by mouth two (2) times a day. (Patient taking differently: Take 25 mg by mouth two (2) times a day.) 20 Tab 0  
 aspirin 81 mg tablet Take 81 mg by mouth daily.  calcium carbonate 1,000 mg Tab Take 1 Tab by mouth daily.  magnesium 250 mg tab Take  by mouth.  fish oil-omega-3 fatty acids (Fish Oil) 340-1,000 mg capsule Take 1 Cap by mouth daily.  albuterol (PROVENTIL HFA, VENTOLIN HFA, PROAIR HFA) 90 mcg/actuation inhaler Take 2 Puffs by inhalation every six (6) hours as needed for Wheezing. 1 Inhaler 0  
 methocarbamoL (Robaxin) 500 mg tablet Take 1 Tab by mouth four (4) times daily. 20 Tab 0  
 naproxen (NAPROSYN) 500 mg tablet Take 1 Tab by mouth every twelve (12) hours as needed for Pain. 20 Tab 0  
 lidocaine 4 % patch 1 Patch by TransDERmal route every twelve (12) hours every twelve (12) hours. 10 Patch 0  
 amoxicillin 500 mg tab Take  by mouth. 500 MG; TAKE 4 TABS PRIOR TO DENTAL WORK No current facility-administered medications on file prior to visit. Allergies Allergen Reactions  Losartan Anaphylaxis  Other Medication Anaphylaxis Allergy shot  Augmentin [Amoxicillin-Pot Clavulanate] Rash  Contrast Agent [Iodine] Hives Face and neck red after adminstration  Tetracycline Rash Review of Systems: 
Constitutional: No fever or chills Neurologic: No headache Eyes: No scleral icterus or irritated eyes Nose: No nasal pain or drainage Mouth: No oral lesions or sore throat Cardiac: No palpations or chest pain Pulmonary: No cough or shortness or breath Gastrointestinal: No nausea, emesis, diarrhea, or constipation Genitourinary: No dysuria Musculoskeletal: No muscle or joint tenderness Skin: No rashes or lesions Psychiatric: No anxiety or depressed mood Objective:  
 
Visit Vitals BP (!) 169/47 (BP 1 Location: Left upper arm, BP Patient Position: Sitting, BP Cuff Size: Adult) Pulse 74 Temp 98.7 °F (37.1 °C) (Oral) Resp 16 Ht 5' 3\" (1.6 m) Wt 139 lb (63 kg) SpO2 94% BMI 24.62 kg/m² Physical Exam: 
General: No acute distress, conversant Eyes: PERRLA, no scleral icterus HENT: Normocephalic without oral lesions Neck: Trachea midline without LAD Cardiac: Normal pulse rate and rhythm Pulmonary: Symmetric chest rise with normal effort GI: Soft, NT, ND, no hernia, no splenomegaly Skin: Warm without rash. Well healed incision on the central mid back Breast: Well healed mastectomy incision on the RIGHT Lymphatic: 3-4 large, 2-3 cm firm, mobile LNs in the RIGHT axilla Extremities: No edema or joint stiffness Psych: Appropriate mood and affect Labs: No results found for this or any previous visit (from the past 24 hour(s)). Data Review: All previous imaging, testing and lab work was reviewed and interpreted. PET/CT 07/14/21:  
IMPRESSION: No Foci of Abnormal Hypermetabolism PET/CT 03/10/21:  
IMPRESSION Enlarged hypermetabolic right axillary lymph nodes are new compared 
to the prior exam. Otherwise normal tracer distribution. Assessment and Plan: ICD-10-CM ICD-9-CM 1. Axillary adenopathy  R59.0 785.6 Recommend RIGHT axillary LN dissection which would be treatment for either breast cancer met or melanoma met. Depending on what final PATH shows, pt may need additional therapies. Reviewed the details of this procedure and what pt should expect for recovery.  Pt should plan to stay in the hospital over night for recovery. All questions were answered and pt is in agreement with this plan. The patient was counseled at length about the risks of dianna Covid-19 during their perioperative period and any recovery window from their procedure. The patient was made aware that dianna Covid-19  may worsen their prognosis for recovering from their procedure and lend to a higher morbidity and/or mortality risk. All material risks, benefits, and reasonable alternatives including postponing the procedure were discussed. The patient does  wish to proceed with the procedure at this time. Total face to face time with patient: 25 minutes. Greater than 50% of the time was spent in counseling. This document was scribed by Lavelle Lambert as dictated by Dr. Silvia Severino. Signed By: Francisca Sequeira MD   
 03/24/21

## 2021-03-24 NOTE — LETTER
3/24/2021 Patient: Mey Rao YOB: 1940 Date of Visit: 3/24/2021 Malena Sanchez MD 
[address] [facility] 
[address] [address] Via Fax: [phone] Dear Malena Sanchez MD, Thank you for referring Ms. Judge Shaffer to [facility] [facility] for evaluation. My notes for this consultation are attached. If you have questions, please do not hesitate to call me. I look forward to following your patient along with you. Sincerely, Brooklynn Jefferson MD

## 2021-03-24 NOTE — PROGRESS NOTES
1. Have you been to the ER, urgent care clinic since your last visit? Hospitalized since your last visit? No 
 
2. Have you seen or consulted any other health care providers outside of the [address] [place] [address] since your last visit? Include any pap smears or colon screening. No 
 
Patient comes in today in a wheelchair. She is with her

## 2021-05-15 PROBLEM — I95.9 HYPOTENSION: Status: ACTIVE | Noted: 2021-01-01

## 2021-05-15 PROBLEM — T50.905A DRUG-INDUCED ANAPHYLAXIS: Status: ACTIVE | Noted: 2021-01-01

## 2021-05-15 PROBLEM — T78.2XXA DRUG-INDUCED ANAPHYLAXIS: Status: ACTIVE | Noted: 2021-01-01

## 2021-05-16 PROBLEM — N39.0 UTI (URINARY TRACT INFECTION): Status: ACTIVE | Noted: 2021-01-01

## 2021-05-16 PROBLEM — E87.6 HYPOKALEMIA: Status: ACTIVE | Noted: 2021-01-01

## 2021-05-16 NOTE — PROGRESS NOTES
Bedside and Verbal shift change report given to Matthias Guerin RN (oncoming nurse) by Ricky Chapman (offgoing nurse). Report included the following information SBAR, Kardex, Intake/Output, MAR and Recent Results.

## 2021-05-16 NOTE — H&P
Hospitalist Admission Note NAME: Mary Jane Gillette :  1940 MRN:  [id number] Date/Time:  5/15/2021 11:53 PM 
 
Patient PCP: Collins Denney MD 
______________________________________________________________________ Given the patient's current clinical presentation, I have a high level of concern for decompensation if discharged from the emergency department. Complex decision making was performed, which includes reviewing the patient's available past medical records, laboratory results, and x-ray films. My assessment of this patient's clinical condition and my plan of care is as follows. Assessment / Plan: Anaphylaxis due to reaction to Keflex POA Causing Hypotension POA-now resolved in ED status post epinephrine, IV fluids Transient Sinus tachycardia POA-as high as in 180/min Hypokalemia POA- mild Suspected ? UTI POA 
UA unremarkable WBC 6.8 Potassium= 3.0 CT head negative acute MRI brain negative acute, significant atrophy noted Troponin negative EKG sinus tachycardia Observation on remote telemetry bed Status post epinephrine, IV Solu-Medrol, Benadryl x1 in the ED Will continue IV Solu-Medrol, Benadryl for now IV Pepcid x 2 doses Resume metoprolol as blood pressure improved and patient sinus tachycardic Status post IV metoprolol x1 in the ED after patient sustained heart rate in the 180s for a brief period likely due to epinephrine IV fluid with potassium unit Continue home magnesium p.o. along with p.o. potassium Continue home metoprolol Status post Levaquin x1 in the ED, will hold off on it for now as patient does not seem to have a UTI, follow any reflex culture Albuterol neb as needed Avoid cephalosporins in future Hypertension Hyperlipidemia History of breast cancer History of CVA/TIA History of kidney stones Continue home metoprolol, Cardura Continue home statin Code Status: Full code Surrogate Decision Maker:  DVT Prophylaxis: Subcu Lovenox GI Prophylaxis: On famotidine as above Baseline: patient lives with  at home, uses walker at times Subjective: CHIEF COMPLAINT: Allergic reaction due to Keflex prescribed for UTI at an urgent care today HISTORY OF PRESENT ILLNESS:    
Paulie Ghosh is a 78 y.o.  female who presents with above complaints from home via EMS with . Patient present with chief complaint of sudden onset of rash over her neck and itching all over with associated nausea and vomiting witnessed by EMS on arrival 20 minutes prior to arrival at ED. History of taking Keflex prescribed earlier in the day and in urgent care for suspected UTI. History of allergic reaction/rash with amoxicillin in the past. 
Patient was found to have severe hypotension as per EMS and 60s for which patient was started on IV fluids in the field and as they were moving her to the stretcher they noticed that she had left-sided facial droop with some left arm numbness and tingling--patient was brought in as a code stroke along with anaphylaxis. Patient apparently was at her baseline when seen by ED physician on arrival-was treated for anaphylactic shock with epinephrine and IV fluid boluses with IV Solu-Medrol and Benadryl-blood pressure improved quickly with elevated lactic acidosis due to severe hypotension on work-up in the ED. Patient apparently had another episode of questionable left-sided facial droop for which telemetry neurology was consulted and an MRI of the brain was done stat which ruled out any stroke. Patient has since remained normotensive with some tachycardia as a residual of effect of epinephrine given earlier for anaphylaxis. We were asked to admit for work up and evaluation of the above problems. Past Medical History:  
Diagnosis Date  Arrhythmia LBBB  Arthritis SPINE  Axillary adenopathy 3/24/2021  Calculus of kidney 1990  Cancer ([facility] [address] right breast X2 ; BCCA  Environmental allergies  GERD (gastroesophageal reflux disease)  Hypercholesterolemia  Hypertension  Nausea & vomiting  Other ill-defined conditions(799.89)   
 high cholesterol  Other ill-defined conditions(799.89) NO BP/VENIPUNCTURES RIGHT ARM (POST LYMPH NODE DISSECTION)  Other ill-defined conditions(799.89) MITRAL VALVE PROLAPSE  Recurrent malignant melanoma of skin ([address] 9/23/2020  Stroke ([address] TIA 2009  (TAKING ASA) Past Surgical History:  
Procedure Laterality Date  HX BACK SURGERY  1998  
 l4-l5  ([facility]) Harpreet Valderrama  
 right  HX BREAST RECONSTRUCTION Right 8/27/2014 Revision Right Reconstructed Breast performed by Yari Casarez MD at [address] HX BREAST RECONSTRUCTION Bilateral 2/20/2015 REVISION RIGHT RECONSTRUCTED BREAST/REMOVE TISSUE EXPANDERS/PLACE IMPLANT RIGHT/MASTOPEXY LEFT performed by Yari Casarez MD at [address] HX CATARACT REMOVAL    
 BILATERAL  
 HX DILATION AND CURETTAGE    
 HX GI  2006 COLONOSCOPY  
 HX LITHOTRIPSY  1991  
 HX MALIGNANT SKIN LESION EXCISION  10/12/2020 Wide excision of recurrent melanoma of the back  HX MASTECTOMY Right 2014  HX ORTHOPAEDIC  2005  
 left bunionectomy  HX MINAL AND BSO  HX TUBAL LIGATION    
 HX UROLOGICAL    
 kidney stone  HX UROLOGICAL  2016 BLADDER SLING Social History Tobacco Use  Smoking status: Never Smoker  Smokeless tobacco: Never Used Substance Use Topics  Alcohol use: No  
  
 
Family History Problem Relation Age of Onset  Heart Disease Mother  Heart Disease Brother  Cancer Brother PROSTATE  Cancer Father LUNG--ASBESTOS EXPOSURE  
 Heart Disease Maternal Aunt  Heart Disease Brother  Anesth Problems Neg Hx Allergies Allergen Reactions  Losartan Anaphylaxis  Other Medication Anaphylaxis   Allergy shot  Augmentin [Amoxicillin-Pot Clavulanate] Rash  Contrast Agent [Iodine] Hives Face and neck red after adminstration  Tetracycline Rash Prior to Admission medications Medication Sig Start Date End Date Taking? Authorizing Provider ZINC PO Take  by mouth. Provider, Historical  
cholecalciferol, vitamin D3, (VITAMIN D3 PO) Take  by mouth. Provider, Historical  
pravastatin (PRAVACHOL) 40 mg tablet Take 40 mg by mouth nightly. OtherCarlos MD  
potassium chloride SA (MICRO-K) 10 mEq capsule Take 10 mEq by mouth two (2) times a day. Carlos Wen MD  
magnesium 250 mg tab Take  by mouth. Carlos Wen MD  
fish oil-omega-3 fatty acids (Fish Oil) 340-1,000 mg capsule Take 1 Cap by mouth daily. Carlos Wen MD  
albuterol (PROVENTIL HFA, VENTOLIN HFA, PROAIR HFA) 90 mcg/actuation inhaler Take 2 Puffs by inhalation every six (6) hours as needed for Wheezing. 10/23/20   Faheem Alvarenga MD  
methocarbamoL (Robaxin) 500 mg tablet Take 1 Tab by mouth four (4) times daily. 10/21/20   Oziel Li MD  
naproxen (NAPROSYN) 500 mg tablet Take 1 Tab by mouth every twelve (12) hours as needed for Pain. 10/21/20   Oziel Li MD  
lidocaine 4 % patch 1 Patch by TransDERmal route every twelve (12) hours every twelve (12) hours. 10/21/20   Oziel Li MD  
OTHER [facility] DAILY    Provider, Historical  
ezetimibe (Zetia) 10 mg tablet Take 10 mg by mouth daily. Provider, Historical  
atorvastatin (LIPITOR) 20 mg tablet Take 20 mg by mouth nightly. Provider, Historical  
doxazosin (CARDURA) 1 mg tablet Take  by mouth daily. Provider, Historical  
MULTIVITAMIN PO Take  by mouth daily. Provider, Historical  
B.infantis-B.ani-B.long-B.bifi (PROBIOTIC 4X) 10-15 mg TbEC Take  by mouth daily. Provider, Historical  
amoxicillin 500 mg tab Take  by mouth.  500 MG; TAKE 4 TABS PRIOR TO DENTAL WORK    Provider, Historical  
hydrochlorothiazide (HYDRODIURIL) 25 mg tablet Take 12.5 mg by mouth daily. Provider, Historical  
metoprolol (LOPRESSOR) 50 mg tablet Take 1 Tab by mouth two (2) times a day. Patient taking differently: Take 25 mg by mouth two (2) times a day. 3/6/11   Nick Ramirez MD  
aspirin 81 mg tablet Take 81 mg by mouth daily. 12/19/10   Carlos Wen MD  
calcium carbonate 1,000 mg Tab Take 1 Tab by mouth daily. 12/19/10   Carlos Wen MD  
 
 
REVIEW OF SYSTEMS:    
 
 
Total of 12 systems reviewed as follows:   
   POSITIVE= underlined text  Negative = text not underlined General:  fever, chills, sweats, generalized weakness, weight loss/gain,  
   loss of appetite Eyes:    blurred vision, eye pain, loss of vision, double vision ENT:    rhinorrhea, pharyngitis Respiratory:   cough, sputum production, SOB, GAMBINO, wheezing, pleuritic pain  
Cardiology:   chest pain, palpitations, orthopnea, PND, edema, syncope Gastrointestinal:  abdominal pain , N/V, diarrhea, dysphagia, constipation, bleeding Genitourinary:  frequency, urgency, dysuria, hematuria, incontinence Muskuloskeletal :  arthralgia, myalgia, back pain Hematology:  easy bruising, nose or gum bleeding, lymphadenopathy Dermatological: rash, ulceration, pruritis, color change / jaundice Endocrine:   hot flashes or polydipsia Neurological:  headache, dizziness, confusion, focal weakness, paresthesia, Speech difficulties, memory loss, gait difficulty Psychological: Feelings of anxiety, depression, agitation Objective: VITALS:   
Visit Vitals /71 Pulse (!) 105 Temp 97.7 °F (36.5 °C) Resp 27 SpO2 92% PHYSICAL EXAM: 
 
General:    Alert, cooperative, no distress, appears stated age. HEENT: Atraumatic, anicteric sclerae, pink conjunctivae No oral ulcers, mucosa moist, throat clear, dentition fair Neck:  Supple, symmetrical,  thyroid: non tender Lungs:   Clear to auscultation bilaterally. No Wheezing or Rhonchi. No rales.  
Chest wall:  No tenderness  No Accessory muscle use. Heart:   Regular  rhythm,  No  murmur   No edema Abdomen:   Soft, non-tender. Not distended. Bowel sounds normal 
Extremities: No cyanosis. No clubbing,   
  Skin turgor normal, Capillary refill normal, Radial dial pulse 2+ Skin:     Not pale. Not Jaundiced  No rashes Psych:  Good insight. Not depressed. Not anxious or agitated. Neurologic: EOMs intact. No facial asymmetry when seen by me in the ED. No aphasia or slurred speech. But slow in answering questions+ symmetrical strength with generalized weakness in all extremities+, Sensation grossly intact. Alert and oriented X 4.  
 
_______________________________________________________________________ Care Plan discussed with: 
  Comments Patient x Family  x   at bedside in ED  
RN x Care Manager Consultant:  x ED MD Valeria Johnson  
_______________________________________________________________________ Expected  Disposition:  
Home with Family x HH/PT/OT/RN ?  
SNF/LTC   
LUPE   
________________________________________________________________________ TOTAL TIME:  55 Minutes Critical Care Provided     Minutes non procedure based Comments  
 x Reviewed previous records  
>50% of visit spent in counseling and coordination of care x Discussion with patient and family and questions answered 
  
 
________________________________________________________________________ Signed: Daria Rodriguez MD 
 
Procedures: see electronic medical records for all procedures/Xrays and details which were not copied into this note but were reviewed prior to creation of Plan. LAB DATA REVIEWED:   
Recent Results (from the past 24 hour(s)) GLUCOSE, POC Collection Time: 05/15/21  9:50 PM  
Result Value Ref Range Glucose (POC) 158 (H) 65 - 117 mg/dL Performed by Danish Gaffney RN   
EKG, 12 LEAD, INITIAL Collection Time: 05/15/21 10:13 PM  
Result Value Ref Range  Ventricular Rate 103 BPM  
 Atrial Rate 103 BPM  
 P-R Interval 172 ms QRS Duration 152 ms Q-T Interval 404 ms QTC Calculation (Bezet) 529 ms Calculated P Axis 104 degrees Calculated R Axis -38 degrees Calculated T Axis 116 degrees Diagnosis Sinus tachycardia Left axis deviation Left bundle branch block When compared with ECG of 23-OCT-2020 07:24, No significant change was found CBC WITH AUTOMATED DIFF Collection Time: 05/15/21 10:16 PM  
Result Value Ref Range WBC 6.8 3.6 - 11.0 K/uL  
 RBC 3.50 (L) 3.80 - 5.20 M/uL  
 HGB 11.1 (L) 11.5 - 16.0 g/dL HCT 33.2 (L) 35.0 - 47.0 % MCV 94.9 80.0 - 99.0 FL  
 MCH 31.7 26.0 - 34.0 PG  
 MCHC 33.4 30.0 - 36.5 g/dL  
 RDW 12.4 11.5 - 14.5 % PLATELET 603 056 - 474 K/uL MPV 10.9 8.9 - 12.9 FL  
 NRBC 0.0 0  WBC ABSOLUTE NRBC 0.00 0.00 - 0.01 K/uL NEUTROPHILS 44 32 - 75 % LYMPHOCYTES 42 12 - 49 % MONOCYTES 10 5 - 13 % EOSINOPHILS 3 0 - 7 % BASOPHILS 0 0 - 1 % IMMATURE GRANULOCYTES 1 (H) 0.0 - 0.5 % ABS. NEUTROPHILS 3.1 1.8 - 8.0 K/UL  
 ABS. LYMPHOCYTES 2.9 0.8 - 3.5 K/UL  
 ABS. MONOCYTES 0.7 0.0 - 1.0 K/UL  
 ABS. EOSINOPHILS 0.2 0.0 - 0.4 K/UL  
 ABS. BASOPHILS 0.0 0.0 - 0.1 K/UL  
 ABS. IMM. GRANS. 0.0 0.00 - 0.04 K/UL  
 DF AUTOMATED METABOLIC PANEL, COMPREHENSIVE Collection Time: 05/15/21 10:16 PM  
Result Value Ref Range Sodium 139 136 - 145 mmol/L Potassium 3.0 (L) 3.5 - 5.1 mmol/L Chloride 103 97 - 108 mmol/L  
 CO2 30 21 - 32 mmol/L Anion gap 6 5 - 15 mmol/L Glucose 167 (H) 65 - 100 mg/dL BUN 20 6 - 20 MG/DL Creatinine 0.88 0.55 - 1.02 MG/DL  
 BUN/Creatinine ratio 23 (H) 12 - 20 GFR est AA >60 >60 ml/min/1.73m2 GFR est non-AA >60 >60 ml/min/1.73m2 Calcium 8.4 (L) 8.5 - 10.1 MG/DL Bilirubin, total 0.6 0.2 - 1.0 MG/DL  
 ALT (SGPT) 23 12 - 78 U/L  
 AST (SGOT) 18 15 - 37 U/L Alk. phosphatase 57 45 - 117 U/L Protein, total 6.2 (L) 6.4 - 8.2 g/dL  Albumin 2.8 (L) 3.5 - 5.0 g/dL Globulin 3.4 2.0 - 4.0 g/dL A-G Ratio 0.8 (L) 1.1 - 2.2 PROTHROMBIN TIME + INR Collection Time: 05/15/21 10:16 PM  
Result Value Ref Range INR 1.0 0.9 - 1.1 Prothrombin time 10.9 9.0 - 11.1 sec TROPONIN I Collection Time: 05/15/21 10:16 PM  
Result Value Ref Range Troponin-I, Qt. <0.05 <0.05 ng/mL

## 2021-05-16 NOTE — ED NOTES
TRANSFER - OUT REPORT: 
 
Verbal report given to [address] [place] [address] Rd, RN(name) on Klaudia Padmini  being transferred to [facility] Surg Room 2143(unit) for routine progression of care Report consisted of patients Situation, Background, Assessment and  
Recommendations(SBAR). Information from the following report(s) ED Summary was reviewed with the receiving nurse. Lines:  
Peripheral IV 05/15/21 Left Wrist (Active) Site Assessment Clean, dry, & intact 05/15/21 2231 Phlebitis Assessment 0 05/15/21 2231 Infiltration Assessment 0 05/15/21 2231 Dressing Status Clean, dry, & intact 05/15/21 2231 Dressing Type Transparent 05/15/21 2231 Peripheral IV 05/16/21 Left Antecubital (Active) Opportunity for questions and clarification was provided. Patient transported with: 
 [employer]

## 2021-05-16 NOTE — ED NOTES
Copied from triage: \"Stroke (pt was on Keflex for a UTI - started to have allergic reaction, EMS arrived and administered epi - pt became hypotensive at 72/37; at time of transfer to EMS stretcher, pt started to have facial droop at 2130)\" Pt arrived in CT. Code Stroke canceled after assessment by Dr. Radha White.

## 2021-05-16 NOTE — ED PROVIDER NOTES
EMERGENCY DEPARTMENT HISTORY AND PHYSICAL EXAM 
 
 
Date: 5/15/2021 Patient Name: Neda Devine Patient Age and Sex: 78 y.o. female History of Presenting Illness Chief Complaint Patient presents with  Allergic Reaction  
  pt was on Keflex for a UTI - started to have allergic reaction, EMS arrived and administered epi - pt became hypotensive at 72/37; at time of transfer to EMS stretcher, pt started to have facial droop at 2130  Facial Droop History Provided By: EMS 
 
HPI: Neda Devine is an 17-year-old female presenting for allergic reaction and facial droop. According to EMS, patient was recently diagnosed with a UTI and took her first dose of Keflex tonight. About 20 minutes later started to develop a rash over her neck and itching as well as nausea and vomiting so called EMS. When EMS arrived patient was noted to be hypotensive with systolics in the 73W so they put her on fluids. As they were moving her to the stretcher, they noticed that patient had a left-sided facial droop as well as some left arm numbness and tingling. This began around 9:30 PM.  Patient states she just does not feel good and feels nauseated and sick. She has taken Keflex in the past.  EMS reported that after giving her 500 of normal saline, systolic blood pressure did improve to the 80s. Also gave her 0.3 mg epinephrine in the humerus. There are no other complaints, changes, or physical findings at this time. PCP: Lindsey Ramon MD 
 
No current facility-administered medications on file prior to encounter. Current Outpatient Medications on File Prior to Encounter Medication Sig Dispense Refill  ZINC PO Take  by mouth.  cholecalciferol, vitamin D3, (VITAMIN D3 PO) Take  by mouth.  pravastatin (PRAVACHOL) 40 mg tablet Take 40 mg by mouth nightly.  potassium chloride SA (MICRO-K) 10 mEq capsule Take 10 mEq by mouth two (2) times a day.     
 magnesium 250 mg tab Take  by mouth.    
 fish oil-omega-3 fatty acids (Fish Oil) 340-1,000 mg capsule Take 1 Cap by mouth daily.  albuterol (PROVENTIL HFA, VENTOLIN HFA, PROAIR HFA) 90 mcg/actuation inhaler Take 2 Puffs by inhalation every six (6) hours as needed for Wheezing. 1 Inhaler 0  
 methocarbamoL (Robaxin) 500 mg tablet Take 1 Tab by mouth four (4) times daily. 20 Tab 0  
 naproxen (NAPROSYN) 500 mg tablet Take 1 Tab by mouth every twelve (12) hours as needed for Pain. 20 Tab 0  
 lidocaine 4 % patch 1 Patch by TransDERmal route every twelve (12) hours every twelve (12) hours. 10 Patch 0  
 OTHER PREVAGEN DAILY  ezetimibe (Zetia) 10 mg tablet Take 10 mg by mouth daily.  atorvastatin (LIPITOR) 20 mg tablet Take 20 mg by mouth nightly.  doxazosin (CARDURA) 1 mg tablet Take  by mouth daily.  MULTIVITAMIN PO Take  by mouth daily.  B.infantis-B.ani-B.long-B.bifi (PROBIOTIC 4X) 10-15 mg TbEC Take  by mouth daily.  amoxicillin 500 mg tab Take  by mouth. 500 MG; TAKE 4 TABS PRIOR TO DENTAL WORK    
 hydrochlorothiazide (HYDRODIURIL) 25 mg tablet Take 12.5 mg by mouth daily.  metoprolol (LOPRESSOR) 50 mg tablet Take 1 Tab by mouth two (2) times a day. (Patient taking differently: Take 25 mg by mouth two (2) times a day.) 20 Tab 0  
 aspirin 81 mg tablet Take 81 mg by mouth daily.  calcium carbonate 1,000 mg Tab Take 1 Tab by mouth daily. Past History Past Medical History: 
Past Medical History:  
Diagnosis Date  Arrhythmia LBBB  Arthritis SPINE  Axillary adenopathy 3/24/2021  Calculus of kidney 1990  Cancer ([facility] [address] right breast X2 ; BCCA  Environmental allergies  GERD (gastroesophageal reflux disease)  Hypercholesterolemia  Hypertension  Nausea & vomiting  Other ill-defined conditions(799.89)   
 high cholesterol  Other ill-defined conditions(799.89) NO BP/VENIPUNCTURES RIGHT ARM (POST LYMPH NODE DISSECTION)  Other ill-defined conditions(799.89) MITRAL VALVE PROLAPSE  Recurrent malignant melanoma of skin ([address] 9/23/2020  Stroke ([address] TIA 2009  (TAKING ASA) Past Surgical History: 
Past Surgical History:  
Procedure Laterality Date  HX BACK SURGERY  1998  
 l4-l5  ([facility]) Harpreet Valderrama  
 right  HX BREAST RECONSTRUCTION Right 8/27/2014 Revision Right Reconstructed Breast performed by Grupo Ponce MD at [address] HX BREAST RECONSTRUCTION Bilateral 2/20/2015 REVISION RIGHT RECONSTRUCTED BREAST/REMOVE TISSUE EXPANDERS/PLACE IMPLANT RIGHT/MASTOPEXY LEFT performed by Grupo Ponce MD at [address] HX CATARACT REMOVAL    
 BILATERAL  
 HX DILATION AND CURETTAGE    
 HX GI  2006 COLONOSCOPY  
 HX LITHOTRIPSY  1991  
 HX MALIGNANT SKIN LESION EXCISION  10/12/2020 Wide excision of recurrent melanoma of the back  HX MASTECTOMY Right 2014  HX ORTHOPAEDIC  2005  
 left bunionectomy  HX MINAL AND BSO  HX TUBAL LIGATION    
 HX UROLOGICAL    
 kidney stone  HX UROLOGICAL  2016 BLADDER SLING Family History: 
Family History Problem Relation Age of Onset  Heart Disease Mother  Heart Disease Brother  Cancer Brother PROSTATE  Cancer Father LUNG--ASBESTOS EXPOSURE  
 Heart Disease Maternal Aunt  Heart Disease Brother  Anesth Problems Neg Hx Social History: 
Social History Tobacco Use  Smoking status: Never Smoker  Smokeless tobacco: Never Used Substance Use Topics  Alcohol use: No  
 Drug use: No  
 
 
Allergies: Allergies Allergen Reactions  Losartan Anaphylaxis  Other Medication Anaphylaxis Allergy shot  Augmentin [Amoxicillin-Pot Clavulanate] Rash  Contrast Agent [Iodine] Hives Face and neck red after adminstration  Tetracycline Rash Review of Systems Review of Systems Constitutional: Positive for fatigue.  Negative for chills and fever. Respiratory: Negative for cough and shortness of breath. Cardiovascular: Negative for chest pain. Gastrointestinal: Positive for nausea and vomiting. Negative for abdominal pain, constipation and diarrhea. Genitourinary: Negative for dysuria, frequency and hematuria. Skin: Positive for rash. Neurological: Negative for weakness and numbness. All other systems reviewed and are negative. Physical Exam  
Physical Exam 
Vitals signs and nursing note reviewed. Constitutional:   
   General: She is in acute distress. Appearance: She is well-developed. HENT:  
   Head: Normocephalic and atraumatic. Nose: Nose normal.  
   Mouth/Throat:  
   Mouth: Mucous membranes are moist.  
Eyes:  
   Extraocular Movements: Extraocular movements intact. Conjunctiva/sclera: Conjunctivae normal.  
Neck: Musculoskeletal: Normal range of motion and neck supple. Cardiovascular:  
   Rate and Rhythm: Normal rate and regular rhythm. Pulmonary:  
   Effort: Pulmonary effort is normal. No respiratory distress. Breath sounds: Normal breath sounds. Abdominal:  
   General: There is no distension. Palpations: Abdomen is soft. Tenderness: There is no abdominal tenderness. Skin: 
   Coloration: Skin is pale. Neurological:  
   Mental Status: She is alert and oriented to person, place, and time. Comments: Patient does have a noticeable left-sided facial droop, but has weakness of bilateral arms as well as legs. Patient is pale appearing and answering questions appropriately. Psychiatric:  
   Comments: Unable to assess Diagnostic Study Results Labs - Recent Results (from the past 12 hour(s)) GLUCOSE, POC Collection Time: 05/15/21  9:50 PM  
Result Value Ref Range Glucose (POC) 158 (H) 65 - 117 mg/dL Performed by Arnold Brandt RN   
EKG, 12 LEAD, INITIAL Collection Time: 05/15/21 10:13 PM  
Result Value Ref Range  Ventricular Rate 103 BPM  
 Atrial Rate 103 BPM  
 P-R Interval 172 ms QRS Duration 152 ms Q-T Interval 404 ms QTC Calculation (Bezet) 529 ms Calculated P Axis 104 degrees Calculated R Axis -38 degrees Calculated T Axis 116 degrees Diagnosis Sinus tachycardia Left axis deviation Left bundle branch block When compared with ECG of 23-OCT-2020 07:24, No significant change was found CBC WITH AUTOMATED DIFF Collection Time: 05/15/21 10:16 PM  
Result Value Ref Range WBC 6.8 3.6 - 11.0 K/uL  
 RBC 3.50 (L) 3.80 - 5.20 M/uL  
 HGB 11.1 (L) 11.5 - 16.0 g/dL HCT 33.2 (L) 35.0 - 47.0 % MCV 94.9 80.0 - 99.0 FL  
 MCH 31.7 26.0 - 34.0 PG  
 MCHC 33.4 30.0 - 36.5 g/dL  
 RDW 12.4 11.5 - 14.5 % PLATELET 356 009 - 341 K/uL MPV 10.9 8.9 - 12.9 FL  
 NRBC 0.0 0  WBC ABSOLUTE NRBC 0.00 0.00 - 0.01 K/uL NEUTROPHILS 44 32 - 75 % LYMPHOCYTES 42 12 - 49 % MONOCYTES 10 5 - 13 % EOSINOPHILS 3 0 - 7 % BASOPHILS 0 0 - 1 % IMMATURE GRANULOCYTES 1 (H) 0.0 - 0.5 % ABS. NEUTROPHILS 3.1 1.8 - 8.0 K/UL  
 ABS. LYMPHOCYTES 2.9 0.8 - 3.5 K/UL  
 ABS. MONOCYTES 0.7 0.0 - 1.0 K/UL  
 ABS. EOSINOPHILS 0.2 0.0 - 0.4 K/UL  
 ABS. BASOPHILS 0.0 0.0 - 0.1 K/UL  
 ABS. IMM. GRANS. 0.0 0.00 - 0.04 K/UL  
 DF AUTOMATED METABOLIC PANEL, COMPREHENSIVE Collection Time: 05/15/21 10:16 PM  
Result Value Ref Range Sodium 139 136 - 145 mmol/L Potassium 3.0 (L) 3.5 - 5.1 mmol/L Chloride 103 97 - 108 mmol/L  
 CO2 30 21 - 32 mmol/L Anion gap 6 5 - 15 mmol/L Glucose 167 (H) 65 - 100 mg/dL BUN 20 6 - 20 MG/DL Creatinine 0.88 0.55 - 1.02 MG/DL  
 BUN/Creatinine ratio 23 (H) 12 - 20 GFR est AA >60 >60 ml/min/1.73m2 GFR est non-AA >60 >60 ml/min/1.73m2 Calcium 8.4 (L) 8.5 - 10.1 MG/DL Bilirubin, total 0.6 0.2 - 1.0 MG/DL  
 ALT (SGPT) 23 12 - 78 U/L  
 AST (SGOT) 18 15 - 37 U/L Alk. phosphatase 57 45 - 117 U/L Protein, total 6.2 (L) 6.4 - 8.2 g/dL Albumin 2.8 (L) 3.5 - 5.0 g/dL Globulin 3.4 2.0 - 4.0 g/dL A-G Ratio 0.8 (L) 1.1 - 2.2 PROTHROMBIN TIME + INR Collection Time: 05/15/21 10:16 PM  
Result Value Ref Range INR 1.0 0.9 - 1.1 Prothrombin time 10.9 9.0 - 11.1 sec TROPONIN I Collection Time: 05/15/21 10:16 PM  
Result Value Ref Range Troponin-I, Qt. <0.05 <0.05 ng/mL Radiologic Studies -  
MRI BRAIN W WO CONT Final Result Prominent atrophy and white matter disease. Progression since the  
prior examination, no acute findings. XR CHEST PORT Final Result  
impression: Negative. CT CODE NEURO HEAD WO CONTRAST Final Result No acute changes. CT Results  (Last 48 hours) 05/15/21 2218  CT CODE NEURO HEAD WO CONTRAST Final result Impression:  No acute changes. Narrative:  EXAM: CT CODE NEURO HEAD WO CONTRAST INDICATION: stroke COMPARISON: March 21, 2019. CONTRAST: None. TECHNIQUE: Unenhanced CT of the head was performed using 5 mm images. Brain and  
bone windows were generated. Coronal and sagittal reformats. CT dose reduction  
was achieved through use of a standardized protocol tailored for this  
examination and automatic exposure control for dose modulation. FINDINGS:  
There is no extra-axial fluid collection hemorrhage or shift. Prominent atrophy  
and nonspecific white matter changes, no mass. CXR Results  (Last 48 hours) 05/15/21 2230  XR CHEST PORT Final result Impression:  impression: Negative. Narrative:  Clinical indication: Chest pain. Portable AP upright view of the chest obtained, comparison October 23, 2020. The  
 heart size is normal. No acute infiltrate. Medical Decision Making I am the first provider for this patient. I reviewed the vital signs, available nursing notes, past medical history, past surgical history, family history and social history.  
 
Vital Signs-Reviewed the patient's vital signs. Patient Vitals for the past 12 hrs: 
 Temp Pulse Resp BP SpO2  
05/15/21 2351 97.7 °F (36.5 °C)      
05/15/21 2330    139/71   
05/15/21 2225  (!) 105 27  92 % 05/15/21 2210    (!) 112/59  Records Reviewed: Nursing Notes and Old Medical Records Provider Notes (Medical Decision Making):  
Patient presenting with rash, nausea, vomiting after taking Keflex. Possibly all related to allergic reaction. With the facial droop and weakness and numbness, possibly related to hypoperfusion of her brain leading to the strokelike symptoms. Code S level 1 called. In addition to bring her to CT, also ordered Solu-Medrol, epinephrine 0.3 mg for her thigh, Pepcid and Benadryl. ED Course:  
Initial assessment performed. The patients presenting problems have been discussed, and they are in agreement with the care plan formulated and outlined with them. I have encouraged them to ask questions as they arise throughout their visit. ED Course as of May 15 2354 Sat May 15, 2021  
2219 Spoke with Dr. Digna Blanchard, neurology who agrees that facial droop which is improving is likely due to hypoperfusion from the anaphylaxis. CT showed no signs of bleed so we will hold off on CTA. Patient's blood pressure improving. Most recent blood pressure 386 systolic. More color in her face now. We will give her second liter of fluid. Already received 0.3 of epinephrine her thigh. Had a large bout of emesis prior to neurology evaluation but now vomiting under control. [JS]  
3731 EKG interpreted by me. Shows sinus tachycardia with a heart rate of 103. No ST elevations or depressions concerning for ischemia. [JS] 9203 Called into the room again that it looks like patient had a facial droop again and was weak on the left side. On my evaluation I did not notice any facial droop or weakness of one side versus another, patient was generally weak and felt ill.   However given her iodine allergy and the concern from the nurse, went ahead and got MRI brain after speaking with radiologist.  
 [JS] 26 Patient's MRI does not show any signs of acute stroke. Likely all related to her hypotension episode and anaphylactic case. [JS] [address] with Dr. Maddie Taylor, hospitalist about this patient. He will admit patient. Recommended getting POC lactate especially after the 2 L of fluid and agreed with giving Levaquin for the UTI after getting the urinalysis. [JS] ED Course User Index [JS] Terrance Reynolds MD  
 
Critical Care Time: CRITICAL CARE NOTE : 
 
11:54 PM 
 
IMPENDING DETERIORATION -Airway, Respiratory, Cardiovascular and Metabolic ASSOCIATED RISK FACTORS - Hypotension, Shock, Hypoxia, Metabolic changes, Dehydration and CNS Decompensation MANAGEMENT- Bedside Assessment and Supervision of Care INTERPRETATION -  Xrays, CT Scan, ECG, Blood Pressure and Cardiac Output Measures INTERVENTIONS - hemodynamic mngmt, Neurologic interventions  and Metobolic interventions CASE REVIEW - Hospitalist/Intensivist, Medical Sub-Specialist, Nursing and Family TREATMENT RESPONSE -Stable PERFORMED BY - Self NOTES   : 
 
I have spent 100 minutes of critical care time involved in lab review, consultations with specialist, family decision- making, bedside attention and documentation. During this entire length of time I was immediately available to the patient . Disposition: 
 
Admission Note: 
Patient is being admitted to the hospital by Dr. Maddie Taylor, Service: Hospitalist.  The results of their tests and reasons for their admission have been discussed with them and available family. They convey agreement and understanding for the need to be admitted and for their admission diagnosis. Diagnosis Clinical Impression: 1. Anaphylaxis, initial encounter 2. Facial droop 3. Hypotension due to hypovolemia Attestations: 
Leandro Favre, M.D.  
 
 
 
Please note that this dictation was completed with [employer], the computer voice recognition software. Quite often unanticipated grammatical, syntax, homophones, and other interpretive errors are inadvertently transcribed by the computer software. Please disregard these errors. Please excuse any errors that have escaped final proofreading. Thank you.

## 2021-05-16 NOTE — PROGRESS NOTES
Problem: Falls - Risk of 
Goal: *Absence of Falls Description: Document Joi Garcia Fall Risk and appropriate interventions in the flowsheet. Outcome: Progressing Towards Goal 
Note: Fall Risk Interventions: 
Mobility Interventions: Bed/chair exit alarm, Patient to call before getting OOB, Utilize walker, cane, or other assistive device Mentation Interventions: Bed/chair exit alarm, More frequent rounding, Room close to nurse's station Elimination Interventions: Call light in reach, Patient to call for help with toileting needs, Toileting schedule/hourly rounds Problem: Patient Education: Go to Patient Education Activity Goal: Patient/Family Education Outcome: Progressing Towards Goal 
  
Problem: General Infection Care Plan (Adult and Pediatric) Goal: Improvement in signs and symptoms of infection Outcome: Progressing Towards Goal 
Goal: *Optimize nutritional status Outcome: Progressing Towards Goal 
  
Problem: Patient Education: Go to Patient Education Activity Goal: Patient/Family Education Outcome: Progressing Towards Goal

## 2021-05-16 NOTE — PROGRESS NOTES
Pt /86 Pt repeatedly attempting to get up and wander. Is oriented to self only and is not redirectable. Pt is restless and does not heed nurses or bed alarms. Perfect serve sent to DEEPIKA Bahena; Pt last lactic 2.98 with no orders for redraw. \"Pt is very restless and confused, only oriented to self. Requesting a sitter. Also last lactic was 2.98. Would you like a redraw? Also Pt bp is 186/86\"

## 2021-05-17 PROBLEM — T78.2XXA ANAPHYLACTIC SHOCK: Status: ACTIVE | Noted: 2021-01-01

## 2021-05-17 NOTE — PROGRESS NOTES
Problem: Mobility Impaired (Adult and Pediatric) Goal: *Acute Goals and Plan of Care (Insert Text) Description: FUNCTIONAL STATUS PRIOR TO ADMISSION: supervised household amb with RW, use of transport w/c when going out to medical appointments, supervised bathing using shower chair and hand held shower head HOME SUPPORT PRIOR TO ADMISSION: The patient lived with  who provided supervision as needed Physical Therapy Goals Initiated 5/17/2021 1. Patient will move supine<->sit with supervision/set-up within 7 day(s). 2.  Patient will transfer from bed to chair and chair to bed with supervision/set-up using the least restrictive device within 7 day(s). 3.  Patient will perform sit to stand with supervision/set-up within 7 day(s). 4.  Patient will ambulate with supervision/set-up for 75 feet with the least restrictive device within 7 day(s). 5.  Patient will ascend/descend 5 stairs with single handrail(s) with minimal assistance/contact guard assist within 7 day(s). Outcome: Not Met PHYSICAL THERAPY EVALUATION Patient: Neda Agrawal (40 y.o. female) Date: 5/17/2021 Primary Diagnosis: Drug-induced anaphylaxis [T78. [address], [address] [place] [address] [place] Hypotension [I95.9] Precautions: fall, limb alert R UE    
 
ASSESSMENT Based on the objective data described below, the patient presents with confusion, general weakness, impaired standing balance, gait dysfunction, decreased function with mobility, increased risk for fall s/p admit with drug-induced anaphylaxis and hypotension. Of note, pt with h/o breast CA and new finding of lymph node hot spots on PET scan pending surgical removal. 
 
Pt received with  in room. Demo word finding difficulty and confusion, unable to provide clear history ( assisted). Pt eager to mobilize. Required repeated cues, increased time, use of RW, and min A to amb in room and on unit.  Tolerated brief standing activity at sink with support and assist for balance. Pt demo decreased ability to sequence functional tasks at times requiring verbal/ tactile cues. Pt  reports pt as below functional baseline. Demo significant risk for fall due to current cognition and functional impairments. Pt will benefit from mobility progression with acute PT and follow up IPR at d/c to facilitate return to PLOF with mobility. Current Level of Function Impacting Discharge (mobility/balance): bed mob min A, transfer min A, amb with RW min A Functional Outcome Measure: The patient scored 14/28 on the Tinetti outcome measure which is indicative of high risk for fall. Other factors to consider for discharge: pt requires assist with all mobility at this time and 24/7 supervision for safety; remains at high risk for fall Patient will benefit from skilled therapy intervention to address the above noted impairments. PLAN : 
Recommendations and Planned Interventions: bed mobility training, transfer training, gait training, therapeutic exercises, patient and family training/education, and therapeutic activities Frequency/Duration: Patient will be followed by physical therapy:  5 times a week to address goals. Recommendation for discharge: (in order for the patient to meet his/her long term goals) Therapy 3 hours per day 5-7 days per week This discharge recommendation: 
Has not yet been discussed the attending provider and/or case management IF patient discharges home will need the following DME: patient owns DME required for discharge SUBJECTIVE:  
Patient confused OBJECTIVE DATA SUMMARY:  
HISTORY:   
Past Medical History:  
Diagnosis Date Arrhythmia LBBB Arthritis SPINE Axillary adenopathy 3/24/2021 Calculus of kidney 1990 Cancer ([facility] [address] right breast X2 ; BCCA Environmental allergies GERD (gastroesophageal reflux disease) Hypercholesterolemia Hypertension Nausea & vomiting  Other ill-defined conditions(799.89)   
 high cholesterol Other ill-defined conditions(799.89) NO BP/VENIPUNCTURES RIGHT ARM (POST LYMPH NODE DISSECTION) Other ill-defined conditions(799.89) MITRAL VALVE PROLAPSE Recurrent malignant melanoma of skin ([facility] [address] 9/23/2020 Stroke [facility]) TIA 2009  (TAKING ASA) Past Surgical History:  
Procedure Laterality Date HX BACK SURGERY  1998  
 l4-l5  [facility]  
 right HX BREAST RECONSTRUCTION Right 8/27/2014 Revision Right Reconstructed Breast performed by Mary Jane Calvo MD at [place] [address]  
 HX BREAST RECONSTRUCTION Bilateral 2/20/2015 REVISION RIGHT RECONSTRUCTED BREAST/REMOVE TISSUE EXPANDERS/PLACE IMPLANT RIGHT/MASTOPEXY LEFT performed by Mary Jane Calvo MD at [place] [address]  
 HX CATARACT REMOVAL    
 BILATERAL  
 HX DILATION AND CURETTAGE    
 HX GI  2006 COLONOSCOPY  
 HX LITHOTRIPSY  1991 HX MALIGNANT SKIN LESION EXCISION  10/12/2020 Wide excision of recurrent melanoma of the back HX MASTECTOMY Right 2014 HX ORTHOPAEDIC  2005  
 left bunionectomy HX MINAL AND BSO    
 HX TUBAL LIGATION    
 HX UROLOGICAL    
 kidney stone HX UROLOGICAL  2016 BLADDER SLING Personal factors and/or comorbidities impacting plan of care: recent PET scan with lymph node hot spots identified per  report Home Situation Home Environment: Private residence # Steps to Enter: 5 Rails to Enter: Yes One/Two Story Residence: One story Living Alone: No 
Support Systems: Spouse/Significant Other/Partner Patient Expects to be Discharged to[de-identified] Private residence Current DME Used/Available at Home: Shower chair, Walker, rolling, Other (comment)(transport chair, hand held shower head) Tub or Shower Type: Shower EXAMINATION/PRESENTATION/DECISION MAKING:  
Critical Behavior: 
Neurologic State: Alert, Confused Orientation Level: Oriented X4 Cognition: Appropriate decision making Hearing: Auditory Auditory Impairment: None Range Of Motion: 
AROM: Generally decreased, functional 
  
  
  
  
  
  
  
Strength:   
Strength: Generally decreased, functional 
  
  
  
  
  
  
Tone & Sensation:  
Tone: Normal 
  
  
  
  
Sensation: Intact Coordination: 
Coordination: Within functional limits Vision:  
  
Functional Mobility: 
Bed Mobility: 
  
Supine to Sit: Minimum assistance(assist at trunk) Sit to Supine: Minimum assistance(assist at LEs) Transfers: 
Sit to Stand: Minimum assistance(cues for hand placement) Stand to Sit: Minimum assistance Balance:  
Sitting: Intact Standing: Impaired; With support Standing - Static: Fair;Constant support Standing - Dynamic : Fair;Constant support Ambulation/Gait Training: 
Distance (ft): 50 Feet (ft) Assistive Device: Gait belt;Walker, rolling Ambulation - Level of Assistance: Minimal assistance Gait Abnormalities: Decreased step clearance; Path deviations(stooped posture) Speed/Denae: Pace decreased (<100 feet/min) Step Length: Left shortened;Right shortened Functional Measure: 
Tinetti test: 
 
Sitting Balance: 1 Arises: 1 Attempts to Rise: 1 Immediate Standing Balance: 0 Standing Balance: 0 Nudged: 1 Eyes Closed: 0 Turn 360 Degrees - Continuous/Discontinuous: 1 Turn 360 Degrees - Steady/Unsteady: 0 Sitting Down: 1 Balance Score: 6 Balance total score Indication of Gait: 1 
R Step Length/Height: 1 L Step Length/Height: 1 
R Foot Clearance: 1 L Foot Clearance: 1 Step Symmetry: 1 Step Continuity: 1 Path: 1 Trunk: 0 Walking Time: 0 Gait Score: 8 Gait total score Total Score: 14/28 Overall total score Tinetti Tool Score Risk of Falls 
<19 = High Fall Risk 19-24 = Moderate Fall Risk 25-28 = Low Fall Risk Tinetti ME. Performance-Oriented Assessment of Mobility Problems in Elderly Patients. Love 66; [id number].  (Scoring Description: PT Bulletin Feb. 10, 1993) Older adults: Abel Wong et al, 2009; n = [address] [facility] elderly evaluated with ABC, BREN, ADL, and IADL) · Mean BREN score for males aged 69-68 years = 26.21(3.40) · Mean BREN score for females age 69-68 years = 25.16(4.30) · Mean BREN score for males over 80 years = 23.29(6.02) · Mean BREN score for females over 80 years = 17.20(8.32) Physical Therapy Evaluation Charge Determination History Examination Presentation Decision-Making HIGH Complexity :3+ comorbidities / personal factors will impact the outcome/ POC  MEDIUM Complexity : 3 Standardized tests and measures addressing body structure, function, activity limitation and / or participation in recreation  LOW Complexity : Stable, uncomplicated  LOW Complexity : FOTO score of  Based on the above components, the patient evaluation is determined to be of the following complexity level: LOW Pain Rating: 
No c/o pain Activity Tolerance:  
Good; BP stable throughout After treatment patient left in no apparent distress:  
Call bell within reach, Caregiver / family present, Side rails x 3, and pt sitting up in bed with lunch tray set up COMMUNICATION/EDUCATION:  
The patients plan of care was discussed with: Registered nurse. Fall prevention education was provided and the patient/caregiver indicated understanding., Patient/family have participated as able in goal setting and plan of care. , and Patient/family agree to work toward stated goals and plan of care. Thank you for this referral. 
Jairo Vasquez, PT Time Calculation: 45 mins

## 2021-05-17 NOTE — PROGRESS NOTES
End of Shift Note Bedside shift change report given to Tyson Kohli RN (oncoming nurse) by Tanvir Marroquin RN (offgoing nurse). Report included the following information SBAR, Kardex, Intake/Output and MAR Shift worked:  7p-7a Shift summary and any significant changes:  
  Pt refuse most of her VS.  Pt has been very confused all night. Pt unable to form coherent sentences. Sitter needed. Pt hard to direct or re-direct. Concerns for physician to address:  Severe confusion Zone phone for oncoming shift:    
  
 
Activity: 
Activity Level: Up with Assistance Number times ambulated in hallways past shift: 0 Number of times OOB to chair past shift: 3 Cardiac:  
Cardiac Monitoring: Yes     
Cardiac Rhythm: Sinus Rhythm Access:  
Current line(s): PIV Genitourinary:  
Urinary status: voiding Respiratory:  
O2 Device: None (Room air) Chronic home O2 use?: NO Incentive spirometer at bedside: NO 
  
GI: 
Last Bowel Movement Date: 05/15/21 Current diet:  DIET CARDIAC Regular Passing flatus: YES Tolerating current diet: YES Pain Management:  
Patient states pain is manageable on current regimen: YES Skin: 
Anmol Score: 20 Interventions: increase time out of bed Patient Safety: 
Fall Score: Total Score: 4 Interventions: gripper socks, pt to call before getting OOB and sitter at bedside High Fall Risk: Yes Length of Stay: 
Expected LOS: - - - Actual LOS: 0 Tanvir Marroquin RN

## 2021-05-17 NOTE — PROGRESS NOTES
Problem: Self Care Deficits Care Plan (Adult) Goal: *Acute Goals and Plan of Care (Insert Text) Description:  
FUNCTIONAL STATUS PRIOR TO ADMISSION: Lives with spouse who reports that current presentation is ~ 50% changed from baseline; She is mod I in the home with ADLs and functional mobility with RW, S for higher med management; assists with meal prep with and without use of RW; she reports she drives occasionally; ( did not dispute) Uses walk in shower with chair and HHS and S from spouse. HOME SUPPORT: The patient lived with spouse who assisted PRN, occasioanlly with socks and shoes if back or B Knees arthritis was bothering her too much. Occupational Therapy Goals Initiated 5/17/2021 1. Patient will perform grooming in standing VSS with supervision/set-up within 7 day(s). 2.  Patient will perform upper body dressing with modified independence within 7 day(s). 3.  Patient will perform lower body dressing with supervision/set-up within 7 day(s). 4.  Patient will perform toilet transfers with supervision/set-up within 7 day(s). 5.  Patient will perform all aspects of toileting with supervision/set-up within 7 day(s). 6.  Patient will participate in upper extremity therapeutic exercise/activities with supervision/set-up for 5 minutes within 7 day(s). 7.  Patient will utilize energy conservation, fall prevention and pain management techniques during functional activities with verbal cues within 7 day(s). Outcome: Progressing Towards Goal 
 OCCUPATIONAL THERAPY EVALUATION Patient: Rosalino Parikh (39 y.o. female) Date: 5/17/2021 Primary Diagnosis: Drug-induced anaphylaxis [T78. [address] [place] Hypotension [I95.9] Precautions:   Fall(R UE limb alert) ASSESSMENT Based on the objective data described below, the patient presents with general debility and cognitive changes since admit to ER 5-15 post anaphylaxis 2* allergic medicine reaction; tachy up to 180; BPP today 184/84 post standing and 76 HR. Patient limited by back and B knee pain related to OA. Patient cognition briefly screened: Perseveration noted with draw a clock test, good Teller placed and numbers well spaced, but 6 hands applied when task was interrupted: she did not recognize any deficits. Unable to copy cube at all; Draw A person was accurate. Able to write her name; Reported it was July 27, 2021. (see paper chart for testing)  reports she is presenting ~50% worse than normal, where she is mod I at home with RW use at times only when knees or back hurt her most. He does S bathing. He leaves her alone while he shops; Last fall was ~ 3 months ago when she did not make it fully to toilet before she sat. Today limited by cognition, dynamic balance, back and knee pain and general debility. Word finding noted throughout session Current Level of Function Impacting Discharge (ADLs/self-care): min A self care and functional mobility; mod A cognition Functional Outcome Measure: The patient scored Total: 45/100 on the Barthel Index outcome measure which is indicative of 55% impaired ability to care for basic self needs/dependency on others; inferred 80% dependency on others for instrumental ADLs. Other factors to consider for discharge: spouse very concerned at physical and cognitive changes noted compared to baseline Patient will benefit from skilled therapy intervention to address the above noted impairments. PLAN : 
Recommendations and Planned Interventions: self care training, functional mobility training, therapeutic exercise, balance training, visual/perceptual training, therapeutic activities, cognitive retraining, endurance activities, neuromuscular re-education, patient education, home safety training, and family training/education Frequency/Duration: Patient will be followed by occupational therapy 4 times a week to address goals.  
 
Recommendation for discharge: (in order for the patient to meet his/her long term goals) Therapy 3 hours per day 5-7 days per week This discharge recommendation: 
Has been made in collaboration with the attending provider and/or case management IF patient discharges home will need the following DME: bedside commode, gait belt, shower chair, and walker: rolling SUBJECTIVE:  
Patient stated Oh what's that word, it is so hard to say it now.  OBJECTIVE DATA SUMMARY:  
HISTORY:  
Past Medical History:  
Diagnosis Date Arrhythmia LBBB Arthritis SPINE Axillary adenopathy 3/24/2021 Calculus of kidney 1990 Cancer ([facility] [address] right breast X2 ; BCCA Environmental allergies GERD (gastroesophageal reflux disease) Hypercholesterolemia Hypertension Nausea & vomiting Other ill-defined conditions(799.89)   
 high cholesterol Other ill-defined conditions(799.89) NO BP/VENIPUNCTURES RIGHT ARM (POST LYMPH NODE DISSECTION) Other ill-defined conditions(799.89) MITRAL VALVE PROLAPSE Recurrent malignant melanoma of skin ([facility] [address] 9/23/2020 Stroke [facility]) TIA 2009  (TAKING ASA) Past Surgical History:  
Procedure Laterality Date HX BACK SURGERY  1998  
 l4-l5  (DR HILL--[facility]) [address] [facility]  
 right HX BREAST RECONSTRUCTION Right 8/27/2014 Revision Right Reconstructed Breast performed by Amador Angelucci, MD at [place] [address]  
 HX BREAST RECONSTRUCTION Bilateral 2/20/2015 REVISION RIGHT RECONSTRUCTED BREAST/REMOVE TISSUE EXPANDERS/PLACE IMPLANT RIGHT/MASTOPEXY LEFT performed by Amador Angelucci, MD at [facility]  
 HX CATARACT REMOVAL    
 BILATERAL  
 HX DILATION AND CURETTAGE    
 HX GI  2006 COLONOSCOPY  
 HX LITHOTRIPSY  1991 HX MALIGNANT SKIN LESION EXCISION  10/12/2020 Wide excision of recurrent melanoma of the back HX MASTECTOMY Right 2014 HX ORTHOPAEDIC  2005  
 left bunionectomy  HX MINAL AND BSO    
 HX TUBAL LIGATION    
 HX UROLOGICAL kidney stone HX UROLOGICAL  2016 BLADDER SLING Expanded or extensive additional review of patient history:  
 
Home Situation Home Environment: Private residence # Steps to Enter: 5 Rails to Enter: Yes One/Two Story Residence: One story Living Alone: No 
Support Systems: Spouse/Significant Other/Partner Patient Expects to be Discharged to[de-identified] Private residence Current DME Used/Available at Home: Shower chair, Walker, rolling(transport w/c out of the home; HHS head) Tub or Shower Type: Shower Hand dominance: Right EXAMINATION OF PERFORMANCE DEFICITS: 
Cognitive/Behavioral Status: 
Neurologic State: Alert;Confused Orientation Level: Oriented X4 Cognition: Decreased attention/concentration; Follows commands; Impaired decision making; Impulsive;Memory loss(recommend MoCA) Perception: (impaired clock and cube copy; see paper chart) Perseveration: Perseverates during conversation;Perseverates during ADLS(perseverates during writing) Safety/Judgement: Awareness of environment; Fall prevention;Decreased awareness of need for safety;Decreased insight into deficits; Decreased awareness of need for assistance Skin: intact Edema: none B UE Hearing: Auditory Auditory Impairment: Hard of hearing, bilateral 
 
Vision/Perceptual:   
    
    
    
  
    
Acuity: [facility]) Corrective Lenses: Glasses Range of Motion: 
B UE 
AROM: Generally decreased, functional 
  
  
  
  
  
  
  
 
Strength: 
B UE 
Strength: Generally decreased, functional 
  
  
  
  
 
Coordination: 
Coordination: Generally decreased, functional 
Fine Motor Skills-Upper: Left Intact; Right Intact Gross Motor Skills-Upper: Left Intact; Right Intact Tone & Sensation: 
B UE Tone: Normal 
Sensation: Intact Balance: 
Sitting: Intact Standing: Impaired; With support Standing - Static: Constant support; Fair 
Standing - Dynamic : Constant support; [address] Functional Mobility and Transfers for ADLs: 
Bed Mobility: 
Rolling: Supervision Supine to Sit: Minimum assistance Sit to Supine: Minimum assistance Scooting: Contact guard assistance Transfers: 
Sit to Stand: Contact guard assistance Stand to Sit: Contact guard assistance Bed to Chair: Minimum assistance Toilet Transfer : Minimum assistance(inferred; recommend 1:1 w/RW and staff and gt belt) ADL Assessment: 
Feeding: Stand-by assistance;Setup Oral Facial Hygiene/Grooming: Supervision;Stand-by assistance Bathing: Minimum assistance Upper Body Dressing: Supervision;Minimum assistance Lower Body Dressing: Minimum assistance Toileting: Minimum assistance ADL Intervention and task modifications: 
  
 
 
Patient instructed and indicated understanding the benefits of maintaining activity tolerance, functional mobility, and independence with self care tasks during acute stay  to ensure safe return home and to baseline. Encouraged patient to increase frequency and duration OOB, be out of bed for all meals, perform daily ADLs (as approved by RN/MD regarding bathing etc), and performing functional mobility to/from bathroom with RW and staff only; patient unable to comprend that she should not walk alone; Nsg notified.  present and reinforcing safety education. Cognitive Retraining Safety/Judgement: Awareness of environment; Fall prevention;Decreased awareness of need for safety;Decreased insight into deficits; Decreased awareness of need for assistance Functional Measure: 
Barthel Index: 
 
Bathin Bladder: 5 Bowels: 10 
Groomin Dressin Feeding: 10 Mobility: 0 Stairs: 0 Toilet Use: 5 Transfer (Bed to Chair and Back): 10 Total: 45/100 The Barthel ADL Index: Guidelines 1. The index should be used as a record of what a patient does, not as a record of what a patient could do.  
2. The main aim is to establish degree of independence from any help, physical or verbal, however minor and for whatever reason. 3. The need for supervision renders the patient not independent. 4. A patient's performance should be established using the best available evidence. Asking the patient, friends/relatives and nurses are the usual sources, but direct observation and common sense are also important. However direct testing is not needed. 5. Usually the patient's performance over the preceding 24-48 hours is important, but occasionally longer periods will be relevant. 6. Middle categories imply that the patient supplies over 50 per cent of the effort. 7. Use of aids to be independent is allowed. [place] Peer., Barthel, D.W. (5915). Functional evaluation: the Barthel Index. [facility] (14)2. LORI Mortensen, Bennie Alcala., Merlin Ates., Inga, [facility] (1999). Measuring the change indisability after inpatient rehabilitation; comparison of the responsiveness of the Barthel Index and Functional [place] Measure. Journal of Neurology, Neurosurgery, and Psychiatry, 66(4), 738-058. CARLOS Britt, FAROOQ Simons, & Festus Pickard M.A. (2004.) Assessment of post-stroke quality of life in cost-effectiveness studies: The usefulness of the Barthel Index and the EuroQoL-5D. [facility], 13, 289-26 Occupational Therapy Evaluation Charge Determination History Examination Decision-Making LOW Complexity : Brief history review  MEDIUM Complexity : 3-5 performance deficits relating to physical, cognitive , or psychosocial skils that result in activity limitations and / or participation restrictions MEDIUM Complexity : Patient may present with comorbidities that affect occupational performnce. Miniml to moderate modification of tasks or assistance (eg, physical or verbal ) with assesment(s) is necessary to enable patient to complete evaluation Based on the above components, the patient evaluation is determined to be of the following complexity level: LOW Pain Rating: 4/10 back with dynamic activity, still able to work well with reported pain Activity Tolerance:  
Fair, SpO2 stable on RA, and requires rest breaks After treatment patient left in no apparent distress:   
Supine in bed, Call bell within reach, Caregiver / family present, Side rails x 3, and nsg notified of cognitive deficits COMMUNICATION/EDUCATION:  
The patients plan of care was discussed with: Physical therapist and Registered nurse. Home safety education was provided and the patient/caregiver indicated understanding., Patient/family have participated as able in goal setting and plan of care. , and Patient/family agree to work toward stated goals and plan of care. This patients plan of care is appropriate for delegation to [facility]. Thank you for this referral. 
Greer Sin OTR/L Time Calculation: 66 mins

## 2021-05-17 NOTE — PROGRESS NOTES
I reviewed pertinent labs and imaging, and discussed /agreed on the plan of care with Dr. Taras Bowie Hospitalist Progress Note NAME: Shama Castellanos :  1940 MRN:  [id number] Assessment / Plan: 
 
 
Confusion / agitation  
- has some memory issues per  but has had increased confusion  
- initially impulsive , unable to follow directions but has improved  
- likely related to high dose steroids , epinephrine use  
- Head CT neg ,  MRI head Neg ,  
- safety observer in room pt appears calmer today  
-  would like SNF placement - Anaphylaxis due to reaction to Keflex POA- resolved Causing Hypotension POA-now resolved in ED status post epinephrine, IV fluids Transient Sinus tachycardia POA-as high as in 180/min S/p code stroke in ER - hypotensive in field with facial droop , dorene arm weakness 
-  
-Rash on face and body resolved  
- states has taken Keflex before without incident - Sbp now 160-170s  will cont home meds of metoprolol CT HEAD IMPRESSION No acute changes. MRI Brain IMPRESSION Prominent atrophy and white matter disease. Progression since the 
prior examination, no acute findings. - symptoms resolved in ER  
- no further neuro work up Hypokalemia POA- mild K+ 3.0 on admission now at 3.8 Suspected ? UTI POA 
-recvd 1 dose of IV Levaquin in ER no need to continue - Ua unremarkable on admission - Hx of UTIs - exhibited by odor , itching and dysuria  
- no dysiuria , odor or itching on admission - Hypertension  
- cont Metoprolol and HCTZ per home meds - Sbp 150-160s Estimated discharge date: May 18 Barriers: 
 
Code status: Full Prophylaxis: Lovenox Recommended Disposition: Home w/Family Subjective: Chief Complaint / Reason for Physician Visit \"Pt seen this morning eating breakfast now back in bed ,  states pt still very confused but ambulating better .  He would like SNF placement or IP rehab CM to assist Review of Systems: 
Symptom Y/N Comments  Symptom Y/N Comments Fever/Chills n   Chest Pain n   
Poor Appetite n   Edema n   
Cough n   Abdominal Pain Sputum n   Joint Pain n   
SOB/GAMBINO n   Pruritis/Rash y   
Nausea/vomit n   Tolerating PT/OT Diarrhea    Tolerating Diet y Constipation    Other Could NOT obtain due to:   
 
Objective: VITALS:  
Last 24hrs VS reviewed since prior progress note. Most recent are: 
Patient Vitals for the past 24 hrs: 
 Temp Pulse Resp BP SpO2  
05/17/21 1135 98.1 °F (36.7 °C) 77 16 (!) 150/75 94 % 05/17/21 0827 98 °F (36.7 °C) 94 16 (!) 164/80 98 % 05/16/21 1908   17 (!) 164/78   
05/16/21 1600  85     
05/16/21 1440 98.2 °F (36.8 °C) 85 16 (!) 169/95 93 % No intake or output data in the 24 hours ending 05/17/21 1414 I had a face to face encounter and independently examined this patient on 5/17/2021, as outlined below: PHYSICAL EXAM: 
General: WD, WN. Alert, cooperative, no acute distress EENT:  EOMI. Anicteric sclerae. MMM missing teeth Resp:   no wheezing or rales. No accessory muscle use CV:  S1S2  No edema GI:  Soft, Non distended, Non tender. +Bowel sounds Neurologic:  Alert and oriented X 3, normal speech, Psych:   . Not anxious nor agitated Skin:  No rashes. No jaundice , red splotches on face  No rash Reviewed most current lab test results and cultures  YES Reviewed most current radiology test results   YES Review and summation of old records today    NO Reviewed patient's current orders and MAR    YES 
PMH/SH reviewed - no change compared to H&P 
________________________________________________________________________ Care Plan discussed with: 
  Comments Patient x Family RN x Care Manager Consultant Multidiciplinary team rounds were held today with , nursing, pharmacist and clinical coordinator.   Patient's plan of care was discussed; medications were reviewed and discharge planning was addressed. ________________________________________________________________________ Total NON critical care TIME:  30   Minutes Total CRITICAL CARE TIME Spent:   Minutes non procedure based Comments >50% of visit spent in counseling and coordination of care    
________________________________________________________________________ Xin Granados NP Procedures: see electronic medical records for all procedures/Xrays and details which were not copied into this note but were reviewed prior to creation of Plan. LABS: 
I reviewed today's most current labs and imaging studies. Pertinent labs include: 
Recent Labs 05/15/21 
2216 WBC 6.8 HGB 11.1*  
HCT 33.2*  
 Recent Labs 05/17/21 
0606 05/16/21 
0236 05/15/21 
2216  138 139  
K 3.9 3.8 3.0*  
 104 103 CO2 27 28 30 * 162* 167* BUN 21* 18 20 CREA 1.02 0.88 0.88 CA 8.8 8.1* 8.4* ALB  --   --  2.8* TBILI  --   --  0.6 ALT  --   --  23 INR  --   --  1.0 Signed: Xin Granados NP

## 2021-05-17 NOTE — PROGRESS NOTES
End of Shift Note Bedside shift change report given to ROJELIO Noel (oncoming nurse) by Jerry Martinez RN (offgoing nurse). Report included the following information SBAR Shift worked:  0700 to [employer] Shift summary and any significant changes:  
 Awaiting for bed at [facility] and rehab Concerns for physician to address:  n/a 
  
Zone phone for oncoming shift:   [phone] Activity: 
Activity Level: [facility] [place] Room Privileges Number times ambulated in hallways past shift: 0 Number of times OOB to chair past shift: 0 Cardiac:  
Cardiac Monitoring: Yes     
Cardiac Rhythm: Sinus Rhythm Access:  
Current line(s): PIV Genitourinary:  
Urinary status: meyers Respiratory:  
O2 Device: None (Room air) Chronic home O2 use?: N/A Incentive spirometer at bedside: N/A 
  
GI: 
Last Bowel Movement Date: 05/15/21 Current diet:  DIET CARDIAC Regular Passing flatus: YES Tolerating current diet: YES Pain Management:  
Patient states pain is manageable on current regimen: YES Skin: 
Anmol Score: 22 Interventions: turn team 
 
Patient Safety: 
Fall Score: Total Score: 0 Interventions: pt to call before getting OOB High Fall Risk: Yes Length of Stay: 
Expected LOS: - - - Actual LOS: 0 Jerry Martinez RN

## 2021-05-18 NOTE — PROGRESS NOTES
Received message from patient's nurse Humberto Sierra stating : 
 
Hello, in regards to Mrs. Ellington: She has become increasingly agitated and non compliant and pulled out her IV. She is currently wandering the halls and refusing to go back to her room. Please advice. Discussion / orders: 
 
Geodon 10 mg IM x1 Please note that this note was dictated using Dragon computer voice recognition software. Quite often unanticipated grammatical, syntax, homophones, and other interpretive errors are inadvertently transcribed by the computer software. Please disregard these errors. Please excuse any errors that have escaped final proofreading.

## 2021-05-18 NOTE — PROGRESS NOTES
Attempted to see pt this pm and will defer tx today 2/2 to nausea. Will continue to follow and tx when pt is able.

## 2021-05-18 NOTE — PROGRESS NOTES
Received message from patient's nurse Yoana Ko stating : 
 
Good morning. In regards to Mrs. Ellington: Pt. automatic BP after several rechecks is 206/100 and manual after 20 minutes is 206/98. Pt denies having a headache, dizziness, or any other symptoms. Please advise. Thank you. Discussion / orders: 
 
Ordered hydralazine 10 mg IV every 6 hours as needed for systolic blood pressure more than 304 or diastolic blood pressure more than 100 Please note that this note was dictated using Dragon computer voice recognition software. Quite often unanticipated grammatical, syntax, homophones, and other interpretive errors are inadvertently transcribed by the computer software. Please disregard these errors. Please excuse any errors that have escaped final proofreading.

## 2021-05-18 NOTE — PROGRESS NOTES
~0028 Provider notified: pt. automatic BP after several rechecks is 206/100 and manual BP after 20 minutes is 206/98. Pt denies having a headache, dizziness, or any other symptoms. Please advise. ~0100 Hydralazine PRN ordered by provider. Will continue to assess pt.

## 2021-05-18 NOTE — INTERDISCIPLINARY ROUNDS
Interdisciplinary Rounds were completed on 05/18/21 for this patient. Rounds included nursing, clinical care leader, pharmacy, and case management. Plan of care discussed. See clinical pathway and/or care plan for interventions and desired outcomes.

## 2021-05-18 NOTE — PROGRESS NOTES
Transition of Care Plan: 
 
RUR: 13% Disposition: IPR-referral sent to [facility] (pt will need Covid test) Follow up appointments: deferred to therapy DME needed: tbd 
Transportation at Discharge: BLS transport vs.  Keys or means to access home:  will provide IM Medicare letter: needed at d/c Caregiver Contact: Shadi Jonas [phone] Discharge Caregiver contacted prior to discharge? CM will contact cg prior to d/c. 
 
Reason for Admission:  Drug Induced Anaphylaxis; Hypotension RUR Score: 13% Plan for utilizing home health: If needed PCP: First and Last name:  Lu Carr MD 
 
 Name of Practice:  
 Are you a current patient: Yes/No: yes Approximate date of last visit: 6 weeks ago Can you participate in a virtual visit with your PCP: prefers in office Current Advanced Directive/Advance Care Plan: Full Code Advance Care Planning General Advance Care Planning (ACP) Conversation Date of Conversation: 5/18/21 Conducted with: Patient with Decision Making Capacity and Healthcare Decision Maker: Next of Kin by law (only applies in absence of a Healthcare Power of  or Legal Guardian) Healthcare Decision Maker:  
 
Click here to complete [place] including selection of the Healthcare Decision Maker Relationship (ie \"Primary\") Content/Action Overview: DECLINED ACP conversation - will revisit periodically Reviewed DNR/DNI and patient elects Full Code (Attempt Resuscitation) Length of Voluntary ACP Conversation in minutes:  <16 minutes (Non-Billable) Arsen Larose Transition of Care Plan:   IPR 
 
2:40 p.m. CM received a call from Acacia Orellana of [address] [place] [address] who said she is going to watch pt due to her bxs befpre she agrees to accept. CM met with pt at bedside to introduce role, verify demographics, insurance and PCP.   CM also discussed d/c plan. Pt is a 77 yo, ,  female who was admitted to [address] [place] [address] on 5/17/21 with the admitting dx of Drug Induced Anaphylaxis & Hypotension. Pt sees Dr. Chapis Small and last saw him 6 weeks ago. Pt goes in office as needed. Pt uses the [employer]. Pt lives with her spouse in a one floor home with 3 JEREMIAH. Pt has two supportive children. Pt has a walker and transport chair. Pt does not drive. Pt receives assistance for her ADL care from her . Pt denied a hx of HH, SNF or IPR. Pt's  or BLS will transport at d/c. CM met with pt's  who agreed to send referral to [address] [place] [address]. CM will continue to assess for d/c needs. Care Management Interventions PCP Verified by CM: Yes(Pt sees Dr. Chapis Small.) Palliative Care Criteria Met (RRAT>21 & CHF Dx)?: No 
Mode of Transport at Discharge: Other (see comment)(Pt's  will transport at d/c.) Transition of Care Consult (CM Consult): Discharge Planning Discharge Durable Medical Equipment: No(Pt has a walker and transport chair.) Physical Therapy Consult: Yes Occupational Therapy Consult: Yes Speech Therapy Consult: No 
Current Support Network: Lives with Spouse, Own Home(Pt lives with her spouse in a one floor home with 3 JEREMIAH.) Confirm Follow Up Transport: Family( takes her to follow up appts.) The Patient and/or Patient Representative was Provided with a Choice of Provider and Agrees with the Discharge Plan?: Yes Discharge Location Discharge Placement: Home with outpatient services Lorie Camargo,  
Care Management, [address]

## 2021-05-18 NOTE — PROGRESS NOTES
Occupational Therapy Chart reviewed; note patient with nausea; declines therapy at this time; will retry later as able.  Marcell Quevedo OTR/L

## 2021-05-18 NOTE — PROGRESS NOTES
Received message from patient's nurse Marlon Mulligan stating : 
 
Patient remains hypertensive with manual /95. I believe some of this can be attributed to her agitation. Complaining of right hip pain but refusing to take Tylenol stating \"it's a placebo, Tylenol doesn't work. \" Just FYI. Thanks. Discussion / orders: 
 
Ordered dilaudid 0.5 mg IM x 1 Please note that this note was dictated using Dragon computer voice recognition software. Quite often unanticipated grammatical, syntax, homophones, and other interpretive errors are inadvertently transcribed by the computer software. Please disregard these errors. Please excuse any errors that have escaped final proofreading.

## 2021-05-18 NOTE — PROGRESS NOTES
Patient had X2 episode of emesis, clear fluids observed. Patient continues to have increased confusion, patient is not easily directed. Patient is A&O X1 to self. MD made aware
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
## 2021-05-18 NOTE — PROGRESS NOTES
0340: Patient became increasingly agitated and non compliant and pulled out her IV. She is currently wandering the halls and refusing to go back to her room. Multiple attempts made to redirect patient but she attempts to throw walker at staff. 
 
0401: Patient agreeable to return to room but refusing to have IV replaced. Refusing vitals. Pulling off armbands and telemetry monitor. Confused, A/O x2. NP ordered Geodon 10mg IM x1. 
 
1945: Attempt made to place peripheral IV, but patient became combative and verbally abusive. She complains of right hip pain and grimaces when attempting to move right leg. No trauma or falls this shift. Provider notified manual /98. IV dilaudid giver per order. 0700: Patient laying in bed with eyes closed. Will continue to monitor and assess pt. From: Geovanny Pagan  To: Nikolai Soto  

## 2021-05-18 NOTE — PROGRESS NOTES
Hospitalist Progress Note NAME: Stefani Whatley :  1940 MRN:  [id number] Assessment / Plan: 
 
 
Acute metabolic encephalopathy POA ? steroids Acute delirium POA ? Baseline dementia POA 
- has some memory issues per  but has had increased confusion  
- initially impulsive , unable to follow directions but has improved  
- likely related to high dose steroids , epinephrine use  
- Head CT neg ,  MRI head Neg  
- no safety observer currently 
- SNF or acute rehab placement  
- Head CT and brain MRI are negative except atrophy Leukocytosis suspect steroid effect 
- off steroids > 24 hours now, recheck in AM 
- check procalcitonin - No fevers Anaphylaxis due to reaction to Keflex POA- resolved Causing Hypotension POA-now resolved in ED status post epinephrine, IV fluids Transient Sinus tachycardia POA-as high as in 180/min S/p code stroke in ER - hypotensive in field with facial droop , dorene arm weakness 
-Rash on face and body resolved  
- states has taken Keflex before without incident - Sbp now 160-170s  will cont home meds of metoprolol - symptoms resolved in ER Hypokalemia POA- mild K+ 3.0 on admission now at 3.8 Abnormal UA POA 
- No urine culture sent - UA unremarkable on admission - Hx of UTIs - exhibited by odor , itching and dysuria  
- no dysuria , odor or itching on admission Hypertension  
- cont Metoprolol and HCTZ per home meds - Sbp 150-160s Estimated discharge date: May 19 Barriers: 
 
Code status: Full Prophylaxis: Lovenox Recommended Disposition: Home w/Family Subjective: Chief Complaint / Reason for Physician Visit \"My thinking is better\" Alert, agitated overnight, now calm Still very confused, oriented x 1 Denies pain or complaints Review of Systems: 
Symptom Y/N Comments  Symptom Y/N Comments Fever/Chills n   Chest Pain n   
Poor Appetite    Edema n   
Cough n   Abdominal Pain Sputum n   Joint Pain n SOB/GAMBINO n   Pruritis/Rash y   
Nausea/vomit n   Tolerating PT/OT Diarrhea    Tolerating Diet y Constipation    Other Could NOT obtain due to:   
 
Objective: VITALS:  
Last 24hrs VS reviewed since prior progress note. Most recent are: 
Patient Vitals for the past 24 hrs: 
 Temp Pulse Resp BP SpO2  
05/18/21 1329  77  (!) 178/104   
05/18/21 1102 98.6 °F (37 °C) 63  (!) 174/65   
05/18/21 0951  73  (!) 199/84   
05/18/21 0930 98.6 °F (37 °C) 73  (!) 215/91 99 % 05/18/21 0022    (!) 206/98   
05/18/21 0007 98.1 °F (36.7 °C) 72 18 (!) 206/100 96 % 05/17/21 2114  68  (!) 176/67   
05/17/21 2029 98.4 °F (36.9 °C) 70 16 (!) 192/80 96 % 05/17/21 1511 98.9 °F (37.2 °C) 79 16 (!) 177/84 95 % Intake/Output Summary (Last 24 hours) at 5/18/2021 1335 Last data filed at 5/17/2021 2114 Gross per 24 hour Intake 120 ml Output  Net 120 ml I had a face to face encounter and independently examined this patient on 5/18/2021, as outlined below: PHYSICAL EXAM: 
General: WD, WN. Alert, cooperative, no acute distress EENT:  EOMI. Anicteric sclerae. MMM missing teeth Resp:   no wheezing or rales. No accessory muscle use CV:  S1S2  No edema GI:  Soft, Non distended, Non tender. +Bowel sounds Neurologic:  Alert and oriented X 1, normal speech, Psych:   . Not anxious nor agitated Skin:  No rashes. No jaundice , red splotches on face  No rash Reviewed most current lab test results and cultures  YES Reviewed most current radiology test results   YES Review and summation of old records today    NO Reviewed patient's current orders and MAR    YES 
PMH/SH reviewed - no change compared to H&P 
________________________________________________________________________ Care Plan discussed with: 
  Comments Patient x Family RN x Care Manager Consultant                   Multidiciplinary team rounds were held today with , nursing, pharmacist and clinical coordinator. Patient's plan of care was discussed; medications were reviewed and discharge planning was addressed. ________________________________________________________________________ Comments >50% of visit spent in counseling and coordination of care    
________________________________________________________________________ Alvin Cook MD  
 
Procedures: see electronic medical records for all procedures/Xrays and details which were not copied into this note but were reviewed prior to creation of Plan. LABS: 
I reviewed today's most current labs and imaging studies. Pertinent labs include: 
Recent Labs 05/18/21 
0216 05/15/21 
2216 WBC 17.1* 6.8 HGB 10.9* 11.1*  
HCT 32.4* 33.2*  
 254 Recent Labs 05/18/21 
0216 05/17/21 
0606 05/16/21 
0236 05/15/21 
2216  139 138 139  
K 4.0 3.9 3.8 3.0*  
 106 104 103 CO2 29 27 28 30 * 154* 162* 167* BUN 26* 21* 18 20 CREA 0.98 1.02 0.88 0.88 CA 8.5 8.8 8.1* 8.4* ALB  --   --   --  2.8* TBILI  --   --   --  0.6 ALT  --   --   --  23 INR  --   --   --  1.0 Signed: Alvin Cook MD

## 2021-05-18 NOTE — PROGRESS NOTES
Problem: Falls - Risk of 
Goal: *Absence of Falls Description: Document Briseida Wright Fall Risk and appropriate interventions in the flowsheet. Outcome: Progressing Towards Goal 
Note: Fall Risk Interventions: 
Mobility Interventions: Bed/chair exit alarm Mentation Interventions: Bed/chair exit alarm Medication Interventions: Bed/chair exit alarm Elimination Interventions: Bed/chair exit alarm, Call light in reach History of Falls Interventions: Bed/chair exit alarm Problem: Patient Education: Go to Patient Education Activity Goal: Patient/Family Education Outcome: Progressing Towards Goal 
  
Problem: General Infection Care Plan (Adult and Pediatric) Goal: Improvement in signs and symptoms of infection Outcome: Progressing Towards Goal 
Goal: *Optimize nutritional status Outcome: Progressing Towards Goal 
  
Problem: Patient Education: Go to Patient Education Activity Goal: Patient/Family Education Outcome: Progressing Towards Goal 
  
Problem: Patient Education: Go to Patient Education Activity Goal: Patient/Family Education Outcome: Progressing Towards Goal 
  
Problem: Patient Education: Go to Patient Education Activity Goal: Patient/Family Education Outcome: Progressing Towards Goal

## 2021-05-19 NOTE — PROGRESS NOTES
Transition of Care Plan: 
 
RUR: 11% Disposition:IPR vs SNF Follow up appointments:PCP follow up DME needed: TBD by therapist  
Transportation at Discharge:BLS-Medical Transport [place] or means to access home:      Family to have Becky Nina to access home IM Medicare letter: 2nd  Medicare Letter to be given Caregiver Contact: Marely Ramirez (spouse) [phone] Discharge Caregiver contacted prior to discharge? Family to be contacted at the time of d/c. 
 
CM: Padmini Bernard is currently working with the pt in the Gen Surg Unit. CM made aware that referral was sent to [facility], for IPR. CM received a call from [facility] liaison, via telephone regarding referral.  CM informed that pt referral has been accepted, and facility has started insurance auth, with pt's insurance: [employer]. INSURANCE AUTH CAN TAKE 24-48HRS, FOR APPROVAL. CM aware that pt will require covid test (PCR) prior to d/c. Physician has enter covid consult for pt's nurse to acknowledge. Pt will require EMTALA to be completed by pt's nurse and physician. Pt will require medical transport (BLS) to facility. CM will continue to follow. Padmini Bernard, SILVINA, [address] [facility]

## 2021-05-19 NOTE — PROGRESS NOTES
Hospitalist Progress Note NAME: Klaudia Washington :  1940 MRN:  [id number] Discharge date: ? 21 Barriers: Awaiting insurance auth from [employer] for acute rehab Assessment / Plan: 
 
Acute metabolic encephalopathy POA ? steroids Acute delirium POA ? Baseline dementia POA 
- has some memory issues per  but has had increased confusion after admit 
- initially impulsive now improved 
- likely related to high dose steroids , epinephrine use, benadryl, hospital environment 
- Head CT neg ,  MRI head Neg  
- no safety observer for over 24 hours - MS improved, seen with family in room Oriented to -day, anniversary, daughter in room, place , but took a minute, knew the president is Biden 
- SNF or acute rehab placement  
- Head CT and brain MRI are negative except atrophy - Check COVID-19 PCR - Awaiting insurance auth for acute rehab Recurrent episodes of N/V last night and today Elevated troponin 0.16 Right thigh pain POA Mild epigastric tenderness No bleeding No CP or SOB or pleuritic CP Normal LFTs, lipase Check EKG and serial enzymes Check echo Check LE doppler right leg Check RUQ US 
PRN zofran Leukocytosis suspect steroid effect, now resolved 
- off steroids > 24 hours now, recheck in AM 
- Procalcitonin negative - No fevers Anaphylaxis due to reaction to Keflex POA- resolved Causing Hypotension POA-now resolved in ED status post epinephrine, IV fluids Transient Sinus tachycardia POA-as high as in 180/min S/p code stroke in ER - hypotensive in field with facial droop , dorene arm weakness 
-Rash on face and body resolved  
- states has taken Keflex before without incident - Sbp now 160-170s  will cont home meds of metoprolol - symptoms resolved in ER Hypokalemia POA- mild K+ 3.0 on admission now at 3.8 Abnormal UA POA 
- No urine culture sent - UA unremarkable on admission - Hx of UTIs - exhibited by odor , itching and dysuria  
- no dysuria , odor or itching on admission Hypertension  
- cont Metoprolol and HCTZ per home meds - Sbp 150-160s Code status: Full Prophylaxis: Lovenox Recommended Disposition: Home w/Family Subjective: Chief Complaint / Reason for Physician Visit \"I spit up some food at lunch\" Calm, sitting up in chair visiting with family  says she looks more like herself, still \"a bit confused\" Denies pain or complaints Review of Systems: 
Symptom Y/N Comments  Symptom Y/N Comments Fever/Chills n   Chest Pain n   
Poor Appetite    Edema n   
Cough n   Abdominal Pain Sputum n   Joint Pain n   
SOB/GAMBINO n   Pruritis/Rash y   
Nausea/vomit n   Tolerating PT/OT Diarrhea    Tolerating Diet y Constipation    Other Could NOT obtain due to:   
 
Objective: VITALS:  
Last 24hrs VS reviewed since prior progress note. Most recent are: 
Patient Vitals for the past 24 hrs: 
 Temp Pulse Resp BP SpO2  
05/19/21 1300  78   95 % 05/19/21 1206 98.1 °F (36.7 °C) 73 17 130/63 96 % 05/19/21 0924 98.6 °F (37 °C) (!) 102 20 121/63 100 % 05/19/21 0300 100 °F (37.8 °C) 75 16 (!) 163/71 93 % 05/19/21 0046    (!) 138/58   
05/18/21 2331 98.2 °F (36.8 °C) 69 18 (!) 188/66 93 % 05/18/21 2013 98.2 °F (36.8 °C) 70 18 (!) 154/69 94 % 05/18/21 1824 98.3 °F (36.8 °C) 87 20 126/67 99 % No intake or output data in the 24 hours ending 05/19/21 1540 I had a face to face encounter and independently examined this patient on 5/19/2021, as outlined below: PHYSICAL EXAM: 
General: WD, WN. Alert, cooperative, no acute distress EENT:  Anicteric sclerae. MMM missing teeth Resp:   no wheezing or rales. No accessory muscle use CV:  S1S2  No edema GI:  Soft, Non distended, mildly tender in epigastric area  +Bowel sounds Neurologic:  Alert and oriented to birthday, place, knew wedding anniversary                         Knew her daughter in room is her oldest of 2 daughters Year 2021 after some delay, normal speech, Psych:   . Not anxious nor agitated Skin:  No rashes. No jaundice , red splotches on face  No rash Reviewed most current lab test results and cultures  YES Reviewed most current radiology test results   YES Review and summation of old records today    NO Reviewed patient's current orders and MAR    YES 
PMH/SH reviewed - no change compared to H&P 
________________________________________________________________________ Care Plan discussed with: 
  Comments Patient x Family RN x Care Manager Consultant Multidiciplinary team rounds were held today with , nursing, pharmacist and clinical coordinator. Patient's plan of care was discussed; medications were reviewed and discharge planning was addressed. ________________________________________________________________________ Comments >50% of visit spent in counseling and coordination of care    
________________________________________________________________________ Fabián Melton MD  
 
Procedures: see electronic medical records for all procedures/Xrays and details which were not copied into this note but were reviewed prior to creation of Plan. LABS: 
I reviewed today's most current labs and imaging studies. Pertinent labs include: 
Recent Labs 05/19/21 
0032 05/18/21 0216 WBC 8.9 17.1* HGB 11.7 10.9* HCT 34.5* 32.4*  
 226 Recent Labs 05/19/21 
0032 05/18/21 
0216 05/17/21 
0800 * 140 139  
K 4.0 4.0 3.9  107 106 CO2 30 29 27 * 119* 154* BUN 20 26* 21* CREA 0.76 0.98 1.02  
CA 8.6 8.5 8.8 ALB 2.9*  --   --   
TBILI 0.5  --   --   
ALT 30  --   --   
 
 
Signed: Fabián Melton MD

## 2021-05-19 NOTE — PROGRESS NOTES
Problem: Mobility Impaired (Adult and Pediatric) Goal: *Acute Goals and Plan of Care (Insert Text) Description: FUNCTIONAL STATUS PRIOR TO ADMISSION: supervised household amb with RW, use of transport w/c when going out to medical appointments, supervised bathing using shower chair and hand held shower head HOME SUPPORT PRIOR TO ADMISSION: The patient lived with  who provided supervision as needed Physical Therapy Goals Initiated 5/17/2021 1. Patient will move supine<->sit with supervision/set-up within 7 day(s). 2.  Patient will transfer from bed to chair and chair to bed with supervision/set-up using the least restrictive device within 7 day(s). 3.  Patient will perform sit to stand with supervision/set-up within 7 day(s). 4.  Patient will ambulate with supervision/set-up for 75 feet with the least restrictive device within 7 day(s). 5.  Patient will ascend/descend 5 stairs with single handrail(s) with minimal assistance/contact guard assist within 7 day(s). Outcome: Progressing Towards Goal 
 PHYSICAL THERAPY TREATMENT Patient: Aarti Mahajan (2 y.o. female) Date: 5/19/2021 Diagnosis: Drug-induced anaphylaxis [T78. [address] [place] Hypotension [I95.9] Anaphylactic shock [T78. 2XXA] Precautions: Fall (R UE limb alert) Chart, physical therapy assessment, plan of care and goals were reviewed. ASSESSMENT: Patient continues with skilled PT services and is progressing towards goals, pt continues with nausea but did well with her mobility, no LOB or SOB, does fair with bed mob and transfers, did well with ther-ex, vc's for safety and proper RW use. Current Level of Function Impacting Discharge (mobility/balance): min->mod assist x1 PLAN : Patient continues to benefit from skilled intervention to address the above impairments. Continue treatment per established plan of care 
to address goals.  
 
Recommendation for discharge: (in order for the patient to meet his/her long term goals) Therapy 3 hours per day 5-7 days per week This discharge recommendation: Has not yet been discussed the attending provider and/or case management IF patient discharges home will need the following DME: patient owns DME required for discharge OBJECTIVE DATA SUMMARY:  
 
Critical Behavior: 
Neurologic State: Alert, Confused Orientation Level: Disoriented to place Cognition: Decreased attention/concentration, Decreased command following Safety/Judgement: Awareness of environment, Fall prevention, Decreased awareness of need for safety, Decreased insight into deficits, Decreased awareness of need for assistance Functional Mobility Training: 
Bed Mobility: 
Rolling: Moderate assistance;Assist x1;Additional time Supine to Sit: Moderate assistance;Assist x1 Scooting: Contact guard assistance;Assist x1;Additional time Level of Assistance: Moderate assistance Interventions: Tactile cues; Verbal cues Transfers: 
Sit to Stand: Contact guard assistance;Assist x1 Stand to Sit: Contact guard assistance;Assist x1 Bed to Chair: Contact guard assistance;Assist x1;Additional time Interventions: Tactile cues; Verbal cues Level of Assistance: Contact guard assistance Balance: 
Sitting: Intact; Without support Standing: Intact; With support Standing - Static: Good;Constant support Standing - Dynamic : Good;Constant support Ambulation/Gait Training: 
Distance (ft): 50 Feet (ft) Assistive Device: Gait belt;Walker, rolling Ambulation - Level of Assistance: Contact guard assistance;Assist x1;Additional time Gait Abnormalities: Decreased step clearance Right Side Weight Bearing: Full Left Side Weight Bearing: Full Base of Support: Narrowed Speed/Denae: Slow Step Length: Left shortened;Right shortened Therapeutic Exercises:  
sitting EXERCISE Sets Reps Active Active Assist  
Passive Comments Ankle pumps 1 10 [x] [] [] bilat Heel raises 1 10 [x] [] [] \" Toe tap 1 10 [x] [] [] \" Knee ext 1 10 [x] [] [] \" Hip flex 1 10 [x] [] [] \"  
 
Pain Ratin Activity Tolerance: Poor After treatment patient left in no apparent distress: Sitting in chair, Call bell within reach, and Caregiver / family present COMMUNICATION/COLLABORATION:  
The patients plan of care was discussed with: Registered nurse. Branden Lacy PTA Time Calculation: 31 mins

## 2021-05-19 NOTE — PROGRESS NOTES
Problem: Self Care Deficits Care Plan (Adult) Goal: *Acute Goals and Plan of Care (Insert Text) Description:  
FUNCTIONAL STATUS PRIOR TO ADMISSION: Lives with spouse who reports that current presentation is ~ 50% changed from baseline; She is mod I in the home with ADLs and functional mobility with RW, S for higher med management; assists with meal prep with and without use of RW; she reports she drives occasionally; ( did not dispute) Uses walk in shower with chair and HHS and S from spouse. HOME SUPPORT: The patient lived with spouse who assisted PRN, occasioanlly with socks and shoes if back or B Knees arthritis was bothering her too much. Occupational Therapy Goals Initiated 5/17/2021 1. Patient will perform grooming in standing VSS with supervision/set-up within 7 day(s). 2.  Patient will perform upper body dressing with modified independence within 7 day(s). 3.  Patient will perform lower body dressing with supervision/set-up within 7 day(s). 4.  Patient will perform toilet transfers with supervision/set-up within 7 day(s). 5.  Patient will perform all aspects of toileting with supervision/set-up within 7 day(s). 6.  Patient will participate in upper extremity therapeutic exercise/activities with supervision/set-up for 5 minutes within 7 day(s). 7.  Patient will utilize energy conservation, fall prevention and pain management techniques during functional activities with verbal cues within 7 day(s). Outcome: Progressing Towards Goal 
OCCUPATIONAL THERAPY TREATMENT Patient: Klaudia Washington (42 y.o. female) Date: 5/19/2021 Diagnosis: Drug-induced anaphylaxis [T78. [address] [place] Hypotension [I95.9] Anaphylactic shock [T78. 2XXA] <principal problem not specified> Precautions: Fall (R UE limb alert) Chart, occupational therapy assessment, plan of care, and goals were reviewed. ASSESSMENT Patient continues with skilled OT services and is progressing towards goals. Patient received in bed willing to work but c/o new R hip 8/10 pain, limiting all ADL and mobility tasks this session. (reports she had xray today but I do not see it in the record) Patient continues w/word finding deficits and tangential conversation as well as Ox1 which family reports is NOT baseline. Current Level of Function Impacting Discharge (ADLs): set up-min A UE ADLs, Mod A-Min A LE ADLs, Min A functional mobility with RW and increased time Other factors to consider for discharge: supportive spouse available 24/7 PLAN : 
Patient continues to benefit from skilled intervention to address the above impairments. Continue treatment per established plan of care to address goals. Recommend with staff: out of bed for all meals and walk to bathroom with RW & gt belt min A Recommend next OT session:  
 
Recommendation for discharge: (in order for the patient to meet his/her long term goals) Therapy 3 hours per day 5-7 days per week This discharge recommendation: 
Has been made in collaboration with the attending provider and/or case management IF patient discharges home will need the following DME: TBA by rehab SUBJECTIVE:  
Patient stated My hip hurts; that's what is wrong.  OBJECTIVE DATA SUMMARY:  
Cognitive/Behavioral Status: 
Neurologic State: Alert Orientation Level: Oriented to person;Disoriented to time;Disoriented to situation;Disoriented to place Cognition: Impaired decision making; Impulsive;Memory loss; Follows commands Perception:  (recommend further assessment) Perseveration: Perseverates during conversation Safety/Judgement: Fall prevention;Decreased insight into deficits; Decreased awareness of need for safety;Decreased awareness of need for assistance;Decreased awareness of environment (recarkably different than baseline per family) Functional Mobility and Transfers for ADLs: 
Bed Mobility: 
Rolling: Contact guard assistance Supine to Sit: Contact guard assistance Sit to Supine: Minimum assistance Scooting: Minimum assistance Transfers: 
Sit to Stand: Minimum assistance Functional Transfers Toilet Transfer : Minimum assistance Bed to Chair: Minimum assistance Balance: 
Sitting: Intact Standing: Impaired; Without support; With support Standing - Static: Constant support;Good Standing - Dynamic : Fair;Constant support ADL Intervention: 
Feeding Feeding Assistance: Set-up; Supervision Grooming Grooming Assistance: Minimum assistance (tactle cues for attention to details) Position Performed: Seated edge of bed Upper Body Dressing Assistance Dressing Assistance: Minimum assistance Lower Body Dressing Assistance Dressing Assistance: Moderate assistance Toileting Toileting Assistance: Minimum assistance; Moderate assistance Cognitive Retraining Attention to Task: Single task Maintains Attention For (Time): 1 minute Following Commands: Follows one step commands/directions Safety/Judgement: Fall prevention;Decreased insight into deficits; Decreased awareness of need for safety;Decreased awareness of need for assistance;Decreased awareness of environment (recarkably different than baseline per family) Pain: 
8/10 R hip; FACES scale used; patient did not c/o this pain yesterday Activity Tolerance:  
Poor, requires frequent rest breaks, and limited today by R hip pain and cognition After treatment patient left in no apparent distress:  
Supine in bed, Heels elevated for pressure relief, Call bell within reach, and Side rails x 3 
 
COMMUNICATION/COLLABORATION:  
The patients plan of care was discussed with: Physical therapy assistant and Registered nurse. Rosario Rodriguez OTR/L Time Calculation: 31 mins

## 2021-05-20 NOTE — PROGRESS NOTES
Occupational Therapy Chart reviewed; earlier NPO with ultrasound being completed; Now with meal, hungry post NPO. Will retry later as able.  Ness Mccartney OTR/L

## 2021-05-20 NOTE — PROGRESS NOTES
Problem: Mobility Impaired (Adult and Pediatric) Goal: *Acute Goals and Plan of Care (Insert Text) Description: FUNCTIONAL STATUS PRIOR TO ADMISSION: supervised household amb with RW, use of transport w/c when going out to medical appointments, supervised bathing using shower chair and hand held shower head HOME SUPPORT PRIOR TO ADMISSION: The patient lived with  who provided supervision as needed Physical Therapy Goals Initiated 5/17/2021 1. Patient will move supine<->sit with supervision/set-up within 7 day(s). 2.  Patient will transfer from bed to chair and chair to bed with supervision/set-up using the least restrictive device within 7 day(s). 3.  Patient will perform sit to stand with supervision/set-up within 7 day(s). 4.  Patient will ambulate with supervision/set-up for 75 feet with the least restrictive device within 7 day(s). 5.  Patient will ascend/descend 5 stairs with single handrail(s) with minimal assistance/contact guard assist within 7 day(s). Outcome: Progressing Towards Goal 
 PHYSICAL THERAPY TREATMENT Patient: Naya Bermudez (34 y.o. female) Date: 5/20/2021 Diagnosis: Drug-induced anaphylaxis [T78. [address] [place] Hypotension [I95.9] Anaphylactic shock [T78. 2XXA] Precautions: Fall (R UE limb alert) Chart, physical therapy assessment, plan of care and goals were reviewed. ASSESSMENT: Patient continues with skilled PT services and is progressing towards goals, no LOB or SOB, not feeling well 2/2 to not having anything to eat, did well with transfers and ther-ex, vc's for safety and proper RW use. Current Level of Function Impacting Discharge (mobility/balance): CGA x1 PLAN : Patient continues to benefit from skilled intervention to address the above impairments. Continue treatment per established plan of care 
to address goals.  
 
Recommendation for discharge: (in order for the patient to meet his/her long term goals) Therapy 3 hours per day 5-7 days per week This discharge recommendation: Has not yet been discussed the attending provider and/or case management IF patient discharges home will need the following DME: patient owns DME required for discharge OBJECTIVE DATA SUMMARY:  
 
Critical Behavior: 
Neurologic State: Alert Orientation Level: Oriented X4 Cognition: Impaired decision making, Impulsive, Memory loss, Follows commands Safety/Judgement: Fall prevention, Decreased insight into deficits, Decreased awareness of need for safety, Decreased awareness of need for assistance, Decreased awareness of environment (recarkably different than baseline per family) Functional Mobility Training: 
Bed Mobility: Pt sitting in chair on arrival. 
 
Transfers: 
Sit to Stand: Contact guard assistance;Assist x1;Additional time Stand to Sit: Contact guard assistance Bed to Chair: Contact guard assistance;Assist x1;Additional time Interventions: Tactile cues; Verbal cues Level of Assistance: Contact guard assistance;Assist x1;Additional time Balance: 
Sitting: Intact; Without support Standing: Intact; With support Standing - Static: Good;Constant support Standing - Dynamic : Good;Constant support Ambulation/Gait Training: 
Distance (ft): 60 Feet (ft) Assistive Device: Gait belt;Walker, rolling Ambulation - Level of Assistance: Contact guard assistance;Assist x1;Additional time Gait Abnormalities: Decreased step clearance Right Side Weight Bearing: Full Left Side Weight Bearing: Full Base of Support: Narrowed Speed/Denae: Slow Step Length: Left shortened;Right shortened Therapeutic Exercises:  
sitting EXERCISE Sets Reps Active Active Assist  
Passive Comments Ankle pumps 1 10 [x] [] [] bilat Heel raises 1 10 [x] [] [] \" Toe tap 1 10 [x] [] [] \" Knee ext 1 10 [x] [] [] \" Hip flex 1 10 [x] [] [] \"  
 
Pain Rating:  
 
Activity Tolerance: Fair After treatment patient left in no apparent distress: Sitting in chair, Call bell within reach, and Caregiver / family present COMMUNICATION/COLLABORATION:  
The patients plan of care was discussed with: Registered nurse. Margaret Franco PTA Time Calculation: 30 mins

## 2021-05-20 NOTE — PROGRESS NOTES
2150 
Patient ambulating in the room and re-arranging her furniture. Patient told nurse Rahat Khan that she does \"not want medication tonight, I'm fine\" [id number] Patient changed her mind and requested her night time medication. Patient given snacks and handed her her house phone that she's \"been looking for\"

## 2021-05-20 NOTE — PROGRESS NOTES
Spiritual Care Assessment/Progress Note [address] 
 
 
NAME: Klaudia Washington      MRN: [id number] AGE: 78 y.o. SEX: female Anglican Affiliation: Quaker Language: [place] 5/20/2021     Total Time (in minutes): 13 Spiritual Assessment begun in [facility] through conversation with: 
  
    [x]Patient        [x] Family    [] Friend(s) Reason for Consult: Initial/Spiritual assessment, patient floor Spiritual beliefs: (Please include comment if needed) [x] Identifies with a yudelka tradition:     
   [x] Supported by a yudelka community: [employer]       
   [] Claims no spiritual orientation:       
   [] Seeking spiritual identity:            
   [] Adheres to an individual form of spirituality:       
   [] Not able to assess:                   
 
    
Identified resources for coping:  
   [x] Prayer                           
   [] Music                  [] Guided Imagery [x] Family/friends                 [] Pet visits [] Devotional reading                         [] Unknown 
   [] Other:                                        
 
 
Interventions offered during this visit: (See comments for more details) Patient Interventions: Affirmation of emotions/emotional suffering, Affirmation of yudelka, Iconic (affirming the presence of God/Higher Power), Initial/Spiritual assessment, patient floor, Prayer (assurance of) Family/Friend(s): Affirmation of yudelka, Catharsis/review of pertinent events in supportive environment, Iconic (affirming the presence of God/Higher Power), Prayer (assurance of), Anglican beliefs/image of God discussed Plan of Care: 
 
 [] Support spiritual and/or cultural needs  
 [] Support AMD and/or advance care planning process    
 [] Support grieving process 
 [] Coordinate Rites and/or Rituals  
 [] Coordination with community clergy 
 [x] No spiritual needs identified at this time 
 [] Detailed Plan of Care below (See Comments)  [] Make referral to Music Therapy 
[] Make referral to Pet Therapy    
[] Make referral to Addiction services 
[] Make referral to [facility] 
[] Make referral to Spiritual Care Partner 
[] No future visits requested       
[x] Follow up upon further referrals Comments:   Initial visit on Gen Surg for spiritual assessment. Patient's  was presence. Patient had flat affect and deferred to  to answer questions. They are waiting for her to be able to resume a diet as she has been NPO this morning.  inquired about visitation policy in regards to their  who would like to visit. Clarified that a patient's  is allowed and does not count as the ONE visitor. Patients may have their  and one visitor. They have been members of [employer] for over 45 years. Neither patient nor  expressed any spiritual concerns at this time. Provided bedside presence, listening support and assurance of prayer.  available upon referral by nurse or by patient request.   
  
Renate Fournier [facility], [address], Staff  [facility]  Paging Service  [phone])

## 2021-05-20 NOTE — PROGRESS NOTES
Hospitalist Progress Note NAME: Stefani Whatley :  1940 MRN:  [id number] Discharge date: ? 21 Barriers: Awaiting insurance auth from [place] for acute rehab Assessment / Plan: 
 
Acute metabolic encephalopathy POA ? Steroids, improving Acute delirium POA ? Baseline dementia POA 
- has some memory issues per  but has had increased confusion after admit 
- initially impulsive now improved 
- likely related to high dose steroids , epinephrine use, benadryl, hospital environment 
- Head CT neg ,  MRI head Neg  
- no safety observer for over 24 hours - MS improved, seen with family in room Oriented to B-day, anniversary, daughter in room, place , but took a minute, knew the president is Biden 
- SNF or acute rehab placement  
- Head CT and brain MRI are negative except atrophy - Check COVID-19 PCR - Awaiting insurance auth for acute rehab Recurrent episodes of N/V - Elevated troponin 0.16 trending down Right thigh pain POA Mild epigastric tenderness No bleeding No CP or SOB or pleuritic CP Normal LFTs, lipase EKG with left BBB and serial enzymes trending down Check echo LE doppler right leg with no DVT Right knee and thigh  x-rays negative RUQ US normal 
PRN zofran Leukocytosis suspect steroid effect, now resolved 
- off steroids > 24 hours now, improved - Procalcitonin negative - No fevers Anaphylaxis due to reaction to Keflex POA- resolved Causing Hypotension POA-now resolved in ED status post epinephrine, IV fluids Transient Sinus tachycardia POA-as high as in 180/min S/p code stroke in ER - hypotensive in field with facial droop , dorene arm weakness 
-Rash on face and body resolved  
- states has taken Keflex before without incident - Sbp now 160-170s  will cont home meds of metoprolol - symptoms resolved in ER Hypokalemia POA- mild K+ 3.0 on admission now at 3.8 Abnormal UA POA 
- No urine culture sent 
- UA unremarkable on admission - Hx of UTIs - exhibited by odor , itching and dysuria  
- no dysuria , odor or itching on admission Hypertension  
- cont Metoprolol and HCTZ per home meds - Sbp 150-160s Code status: Full Prophylaxis: Lovenox Recommended Disposition: Home w/Family Subjective: Chief Complaint / Reason for Physician Visit \"I did not eat all day\" Upset she was NPO earlier, missed meals and meds MS improved, definitely from admit Denies pain or complaints Review of Systems: 
Symptom Y/N Comments  Symptom Y/N Comments Fever/Chills n   Chest Pain n   
Poor Appetite    Edema n   
Cough n   Abdominal Pain Sputum n   Joint Pain n   
SOB/GAMBINO n   Pruritis/Rash y   
Nausea/vomit n   Tolerating PT/OT Diarrhea    Tolerating Diet y Constipation    Other Could NOT obtain due to:   
 
Objective: VITALS:  
Last 24hrs VS reviewed since prior progress note. Most recent are: 
Patient Vitals for the past 24 hrs: 
 Temp Pulse Resp BP SpO2  
05/20/21 1449 98.7 °F (37.1 °C) (!) 110 18 124/71 97 % 05/20/21 1307 97 °F (36.1 °C) (!) 110 18 (!) 142/64 95 % 05/20/21 0908 99.6 °F (37.6 °C) 92 18 (!) 169/77 94 % 05/20/21 0343 98.9 °F (37.2 °C) 87 17 (!) 154/83 94 % 05/19/21 2346 98.5 °F (36.9 °C) 83 18 (!) 161/77 95 % 05/19/21 2001 98 °F (36.7 °C) 70 16 (!) 165/74 95 % Intake/Output Summary (Last 24 hours) at 5/20/2021 1835 Last data filed at 5/19/2021 2346 Gross per 24 hour Intake 750 ml Output 500 ml Net 250 ml I had a face to face encounter and independently examined this patient on 5/20/2021, as outlined below: PHYSICAL EXAM: 
General: WD, WN. Alert, cooperative, no acute distress EENT:  Anicteric sclerae. MMM missing teeth Resp:   no wheezing or rales. No accessory muscle use CV:  S1S2  No edema GI:  Soft, Non distended, mildly tender in epigastric area  +Bowel sounds Neurologic:  Alert, talking Psych:   . Not anxious nor agitated Skin:  No rashes. No jaundice , red splotches on face  No rash Reviewed most current lab test results and cultures  YES Reviewed most current radiology test results   YES Review and summation of old records today    NO Reviewed patient's current orders and MAR    YES 
PMH/SH reviewed - no change compared to H&P 
________________________________________________________________________ Care Plan discussed with: 
  Comments Patient x Family  x  RN x Care Manager Consultant Multidiciplinary team rounds were held today with , nursing, pharmacist and clinical coordinator. Patient's plan of care was discussed; medications were reviewed and discharge planning was addressed. ________________________________________________________________________ Comments >50% of visit spent in counseling and coordination of care    
________________________________________________________________________ Pal Beltran MD  
 
Procedures: see electronic medical records for all procedures/Xrays and details which were not copied into this note but were reviewed prior to creation of Plan. LABS: 
I reviewed today's most current labs and imaging studies. Pertinent labs include: 
Recent Labs  
  05/20/21 0318 05/19/21 
0032 05/18/21 
0216 WBC 7.2 8.9 17.1* HGB 11.4* 11.7 10.9* HCT 34.0* 34.5* 32.4*  
 213 226 Recent Labs  
  05/20/21 0318 05/19/21 
0032 05/18/21 
0216  135* 140  
K 4.0 4.0 4.0  
 103 107 CO2 31 30 29 GLU 98 116* 119* BUN 16 20 26* CREA 0.78 0.76 0.98  
CA 8.9 8.6 8.5 ALB 2.8* 2.9*  --   
TBILI 0.5 0.5  --   
ALT 24 30  --   
 
 
Signed: Pal Beltran MD

## 2021-05-21 NOTE — PROGRESS NOTES
Occupational Therapy Medical record reviewed. Nurse cleared pt for therapy. Upon arrival, pt was supine in bed with cloth on her head, family member present reports that pt cannot participate today due to her \"terrible headache\". Will defer at family request and continue to follow.

## 2021-05-21 NOTE — PROGRESS NOTES
Problem: Falls - Risk of 
Goal: *Absence of Falls Description: Document Oliver Olivo Fall Risk and appropriate interventions in the flowsheet. Outcome: Progressing Towards Goal 
Note: Fall Risk Interventions: 
Mobility Interventions: OT consult for ADLs, PT Consult for mobility concerns Mentation Interventions: Bed/chair exit alarm Medication Interventions: Bed/chair exit alarm Elimination Interventions: Call light in reach, Patient to call for help with toileting needs History of Falls Interventions: Bed/chair exit alarm Problem: Patient Education: Go to Patient Education Activity Goal: Patient/Family Education Outcome: Progressing Towards Goal 
  
Problem: General Infection Care Plan (Adult and Pediatric) Goal: Improvement in signs and symptoms of infection Outcome: Progressing Towards Goal 
Goal: *Optimize nutritional status Outcome: Progressing Towards Goal 
  
Problem: Patient Education: Go to Patient Education Activity Goal: Patient/Family Education Outcome: Progressing Towards Goal 
  
Problem: Patient Education: Go to Patient Education Activity Goal: Patient/Family Education Outcome: Progressing Towards Goal 
  
Problem: Patient Education: Go to Patient Education Activity Goal: Patient/Family Education Outcome: Progressing Towards Goal 
 Patient is alert but confused with poor safety awareness. Has to be redirected frequently. Has been up out of bed to bsc and chair. No acute distress noted.

## 2021-05-21 NOTE — PROGRESS NOTES
Comprehensive Nutrition Assessment Type and Reason for Visit: Initial, RD nutrition re-screen/LOS Nutrition Recommendations/Plan: 
Continue cardiac diet Nutrition Assessment:    
Patient medically noted for acute metabolic encephalopathy, delirium/confusion, possible dementia, and drug induced anaphylaxis. PMH HTN and breast cancer. Chart reviewed for length of stay. Patient not in her room at time of attempted visit (bathroom?). Awaiting insurance authorization for rehab per notes. Good intake per limited documentation in flowsheets. No weight loss. Encourage intake of meals. Patient Vitals for the past 168 hrs: 
 % Diet Eaten 05/19/21 2001 76 - 100% 05/17/21 0811 51 - 75% Estimated Daily Nutrient Needs: 
Energy (kcal): 1481 kcal (BMR 1139 x 1. 3AF); Weight Used for Energy Requirements: Current Protein (g): 71g (1.0 g/kg bw); Weight Used for Protein Requirements: Current Fluid (ml/day): 1500 mL; Method Used for Fluid Requirements: 1 ml/kcal 
 
Nutrition Related Findings:      
 5/18 Labs reviewed Atorvastatin, Zetia, HCTZ, Mag-ox, Lopressor, Protonix, KCl Wounds:   
None Current Nutrition Therapies: DIET CARDIAC Regular Anthropometric Measures: 
· Height:  5' 3\" (160 cm) · Current Body Wt:  71.2 kg (156 lb 15.5 oz) · BMI Category: Overweight (BMI 25.0-29. 9) Nutrition Diagnosis: No nutrition diagnosis at this time Nutrition Interventions:  
Food and/or Nutrient Delivery: Continue current diet Nutrition Education and Counseling: No recommendations at this time Coordination of Nutrition Care: No recommendation at this time Goals: 
PO intake >50% of meals/snacks next 5-7 days Nutrition Monitoring and Evaluation:  
Behavioral-Environmental Outcomes: None identified Food/Nutrient Intake Outcomes: Food and nutrient intake Physical Signs/Symptoms Outcomes: Biochemical data, Weight Discharge Planning:   
Continue current diet Electronically signed by Malcom Davila RD on 5/21/2021 at 3:36 PM 
 
Contact: ext [phone]

## 2021-05-21 NOTE — INTERDISCIPLINARY ROUNDS
Interdisciplinary Rounds were completed on 05/21/21 for this patient. Rounds included nursing, clinical care leader, pharmacy, and case management. Plan of care discussed. See clinical pathway and/or care plan for interventions and desired outcomes.

## 2021-05-21 NOTE — PROGRESS NOTES
Transition of Care Plan: 
 
RUR: 11% Disposition:SNF Placement Follow up appointments:PCP follow up DME needed: TBD by therapist  
Transportation at Discharge:[facility] [place] or means to access home:      Family to have Sonia Cutler to access home IM Medicare letter: 2nd  Medicare Letter to be given Caregiver Contact: Masoud Baird (spouse) [phone] Discharge Caregiver contacted prior to discharge? Family to be contacted at the time of d/c. UPDATE: 10:14AM 
 
CM informed that pt has been accepted to [facility]. CM will send clinicals to [address] [place] [address] to begin insurance auth. CM will continue to follow. Roseline Leyden, MSW, [address] [facility] CM received call from [address] (IPR)  liaison, via telephone regarding pt's referral and insurance auth. CM informed that pt has been denied acceptance to facility, per [address] [place] [address], and snf placement is being recommended. CM will inform pt/spouse of the following. CM completed room visit with pt, with spouse by bedside. CM informed both that pt has been denied IPR at [facility]. CM informed both that SNF is being recommended and gave information regarding placement at snf vs IPR. Both agreeable to placement and requested that referral to be sent to [facility]. CM sent referral, currently pending acceptance at facility. CM informed both that after facility accepts pt, that insurance auth will be required, through [address] [place] [address]. INSURANCE AUTH CAN TAKE 24-48HRS, FOR ACCEPTANCE. Both agreeable to the following and understands that the weekend is approaching and pt, could potentially remain at [facility] through the weekend. CM will inform physician of the following. CM will send clinicals to [address] [place] [address], once facility accepts. Roseline Leyden, MSW, [address]

## 2021-05-21 NOTE — PROGRESS NOTES
Attempted to see pt this pm and will defer tx today 2/2 to pt having very bad head ache. Will continue to follow and tx when pt is able.

## 2021-05-22 NOTE — PROGRESS NOTES
1000: Walked into pt room as spouse had just given pt \"Motrin\" as stated by spouse. Spouse did not know how many milligrams the motrin was. Spouse and Pt were educated on not taking home meds and they both stated understanding and it would not happen again. End of Shift Note Bedside shift change report given to Mason Pittman RN (oncoming nurse) by Pastor Gladys RN (offgoing nurse). Report included the following information SBAR and Kardex Shift worked:  7a-7p Shift summary and any significant changes: Pt was educated on not taking any medications from home. Pt and spouse agreed this would not happen again. Apart from taking home medication pt receive all care well. Concerns for physician to address:  n/a 
  
[employer] phone for oncoming shift:   [phone] Activity: 
Activity Level: Up with Assistance, Chair, [facility] Room Privileges Number times ambulated in hallways past shift: 1 Number of times OOB to chair past shift: 3 Cardiac:  
Cardiac Monitoring: Yes     
Cardiac Rhythm: Sinus Tachy Access:  
Current line(s): PIV Genitourinary:  
Urinary status: voiding Respiratory:  
O2 Device: None (Room air) Chronic home O2 use?: NO Incentive spirometer at bedside: NO 
  
GI: 
Last Bowel Movement Date: 05/18/21 Current diet:  DIET CARDIAC Regular Passing flatus: YES Tolerating current diet: YES Pain Management:  
Patient states pain is manageable on current regimen: YES Skin: 
Anmol Score: 20 Interventions: increase time out of bed and limit briefs Patient Safety: 
Fall Score: Total Score: 2 Interventions: assistive device (walker, cane, etc), gripper socks and pt to call before getting OOB High Fall Risk: Yes Length of Stay: 
Expected LOS: 3d 0h 
Actual LOS: 5 Pastor Gladys RN

## 2021-05-22 NOTE — PROGRESS NOTES
Hospitalist Progress Note NAME: Daniel Preston :  1940 MRN:  [id number] Discharge date: ? 21 Barriers: Awaiting insurance auth from [place] for SNF, acute rehab denied Assessment / Plan: 
 
Acute metabolic encephalopathy POA ? Steroids, improving Acute delirium POA ? Baseline dementia POA 
- has some memory issues per  but has had increased confusion after admit 
- initially impulsive now improved 
- likely related to high dose steroids , epinephrine use, benadryl, hospital environment 
- Head CT neg ,  MRI head Neg  
- no safety observer for over 24 hours - MS improved, ERNST earlier, now resolved with tylenol 
- Head CT and brain MRI are negative except atrophy 
- COVID-19 PCR negative - Awaiting insurance auth for SNF, medically stable for discharge Elevated troponin 0.16 trending down Chronic systolic CHF LVEF 32-48% POA No current CP, no prior echos EKG with left BBB and serial enzymes trending down Reduced LVEF 
ASA, lipitor, zetia Change metoprolol to coreg Ask cards to check on Check pBNP Recurrent episodes of N/V -, now resolved Right thigh pain POA Normal LFTs, lipase KUB with no free air or obstructiion RUQ US normal 
LE doppler right leg with no DVT Right knee and thigh  x-rays negative PRN zofran N/V resolved, continue PPI Leukocytosis suspect steroid effect, now resolved 
- off steroids > 24 hours now, improved - Procalcitonin negative - No fevers Anaphylaxis due to reaction to Keflex POA- resolved Causing Hypotension POA-now resolved in ED status post epinephrine, IV fluids Transient Sinus tachycardia POA-as high as in 180/min S/p code stroke in ER - hypotensive in field with facial droop , dorene arm weakness 
-Rash on face and body resolved  
- states has taken Keflex before without incident - Sbp now 160-170s  will cont home meds of metoprolol - symptoms resolved in ER Hypokalemia POA- mild K+ 3.0 on admission now at 3.8 Abnormal UA POA 
- No urine culture sent - UA unremarkable on admission - Hx of UTIs - exhibited by odor , itching and dysuria  
- no dysuria , odor or itching on admission Hypertension  
- cont Metoprolol and HCTZ per home meds - Sbp 150-160s Code status: Full Prophylaxis: Lovenox Recommended Disposition: Home w/Family Subjective: Chief Complaint / Reason for Physician Visit f/u anaphylaxis \"the headache is better\" Moderate bilateral headache in AM, improved with tylenol No further N/V Awaiting SNF placement Denies pain or complaints Review of Systems: 
Symptom Y/N Comments  Symptom Y/N Comments Fever/Chills n   Chest Pain n   
Poor Appetite    Edema n   
Cough n   Abdominal Pain Sputum n   Joint Pain n   
SOB/GAMBINO n   Pruritis/Rash y   
Nausea/vomit n   Tolerating PT/OT Diarrhea    Tolerating Diet y Constipation    Other Could NOT obtain due to:   
 
Objective: VITALS:  
Last 24hrs VS reviewed since prior progress note. Most recent are: 
Patient Vitals for the past 24 hrs: 
 Temp Pulse Resp BP SpO2  
05/21/21 2051 98.7 °F (37.1 °C) 70 18 (!) 153/67 97 % 05/21/21 1425 98.5 °F (36.9 °C) 79 18 (!) 147/72 97 % 05/21/21 1130 98.6 °F (37 °C) 77 18 123/65 96 % 05/21/21 1108    (!) 140/77   
05/21/21 0810 98.5 °F (36.9 °C) (!) 118 17 (!) 140/77 96 % 05/21/21 0355 99 °F (37.2 °C) (!) 101 17 (!) 153/81 95 % 05/21/21 0025 99.2 °F (37.3 °C) 93 18 (!) 176/81 93 % No intake or output data in the 24 hours ending 05/21/21 2146 I had a face to face encounter and independently examined this patient on 5/21/2021, as outlined below: PHYSICAL EXAM: 
General: WD, WN. Alert, cooperative, no acute distress EENT:  Anicteric sclerae. MMM missing teeth Resp:   no wheezing or rales. No accessory muscle use CV:  S1S2  No edema GI:  Soft, Non distended, mildly tender in epigastric area  +Bowel sounds Neurologic:  Alert, talking, sitting up in chair Psych:   . Not anxious nor agitated Skin:  No rashes. No jaundice , red splotches on face  No rash Reviewed most current lab test results and cultures  YES Reviewed most current radiology test results   YES Review and summation of old records today    NO Reviewed patient's current orders and MAR    YES 
PMH/SH reviewed - no change compared to H&P 
________________________________________________________________________ Care Plan discussed with: 
  Comments Patient x Family  x  RN x Care Manager x Consultant Multidiciplinary team rounds were held today with , nursing, pharmacist and clinical coordinator. Patient's plan of care was discussed; medications were reviewed and discharge planning was addressed. ________________________________________________________________________  
x Comments >50% of visit spent in counseling and coordination of care    
________________________________________________________________________ Nicholas Navarro MD  
 
Procedures: see electronic medical records for all procedures/Xrays and details which were not copied into this note but were reviewed prior to creation of Plan. LABS: 
I reviewed today's most current labs and imaging studies. Pertinent labs include: 
Recent Labs  
  05/20/21 0318 05/19/21 0032 WBC 7.2 8.9 HGB 11.4* 11.7 HCT 34.0* 34.5*  
 213 Recent Labs  
  05/20/21 0318 05/19/21 0032  135* K 4.0 4.0  
 103 CO2 31 30 GLU 98 116* BUN 16 20 CREA 0.78 0.76 CA 8.9 8.6 ALB 2.8* 2.9* TBILI 0.5 0.5 ALT 24 30 Signed: Nicholas Navarro MD

## 2021-05-22 NOTE — PROGRESS NOTES
End of Shift Note Bedside shift change report given to [facility], RN (oncoming nurse) by Johan Long RN (offgoing nurse). Report included the following information SBAR and Kardex Shift worked:  7a-7p Shift summary and any significant changes:  
  Pt received all care well. Pt had an echo today, pt EJ 30-35%. Pt had hair care. Concerns for physician to address:  N/A [employer] phone for oncoming shift:   [phone] Activity: 
Activity Level: Up with Assistance, Chair, [facility] Room Privileges Number times ambulated in hallways past shift: 1 Number of times OOB to chair past shift: 4 Cardiac:  
Cardiac Monitoring: Yes     
Cardiac Rhythm: Sinus Rhythm Access:  
Current line(s): PIV Genitourinary:  
Urinary status: voiding Respiratory:  
O2 Device: None (Room air) Chronic home O2 use?: NO Incentive spirometer at bedside: NO 
  
GI: 
Last Bowel Movement Date: 05/18/21 Current diet:  DIET CARDIAC Regular Passing flatus: YES Tolerating current diet: YES Pain Management:  
Patient states pain is manageable on current regimen: YES Skin: 
Anmol Score: 20 Interventions: increase time out of bed and limit briefs Patient Safety: 
Fall Score: Total Score: 2 Interventions: gripper socks and pt to call before getting OOB High Fall Risk: Yes Length of Stay: 
Expected LOS: 3d 0h 
Actual LOS: 4 Johan Long RN

## 2021-05-22 NOTE — PROGRESS NOTES
Hospitalist Progress Note NAME: Liz Villarreal :  1940 MRN:  [id number] Discharge date: ? 21 Barriers: Awaiting insurance auth from [place] for SNF, acute rehab denied Assessment / Plan: 
 
Acute metabolic encephalopathy POA ? Steroids, improved Acute delirium POA ? Baseline dementia POA 
- has some memory issues per  but has had increased confusion after admit 
- initially impulsive now improved 
- likely related to high dose steroids , epinephrine use, benadryl, hospital environment 
- Head CT neg ,  MRI head Neg  
- no safety observer for over 24 hours - MS improved, ERNST earlier, now resolved with tylenol 
- Head CT and brain MRI are negative except atrophy 
- COVID-19 PCR negative - Awaiting insurance auth for SNF, medically stable for discharge Elevated troponin 0.16 trending down Chronic systolic CHF LVEF 29-18% POA Follows with Amos Mccall at [facility], denies prior CHF Clinically compensated No current CP, no prior echos EKG with left BBB and serial enzymes trending down Echo LVEF  
ASA, lipitor, zetia Change metoprolol to coreg Ask cards to check on Check pBNP Recurrent episodes of N/V -, now resolved Right thigh pain POA Normal LFTs, lipase KUB with no free air or obstructiion RUQ US normal 
LE doppler right leg with no DVT Right knee and thigh  x-rays negative PRN zofran N/V resolved, continue PPI Leukocytosis suspect steroid effect, now resolved 
- off steroids > 24 hours now, improved - Procalcitonin negative - No fevers Anaphylaxis due to reaction to Keflex POA- resolved Causing Hypotension POA-now resolved in ED status post epinephrine, IV fluids Transient Sinus tachycardia POA-as high as in 180/min S/p code stroke in ER - hypotensive in field with facial droop , dorene arm weakness 
-Rash on face and body resolved  
- states has taken Keflex before without incident - Sbp now 160-170s  will cont home meds of metoprolol - symptoms resolved in ER Hypokalemia POA- mild K+ 3.0 on admission now at 3.8 Abnormal UA POA 
- No urine culture sent - UA unremarkable on admission - Hx of UTIs - exhibited by odor , itching and dysuria  
- no dysuria , odor or itching on admission Hypertension  
- cont Metoprolol and HCTZ per home meds - Sbp 150-160s Code status: Full Prophylaxis: Lovenox Recommended Disposition: Home w/Family Subjective: Chief Complaint / Reason for Physician Visit f/u anaphylaxis \"headache along left side of head\" No new complaints HA recurred this AM 
No further N/V Awaiting SNF placement Denies pain or complaints Review of Systems: 
Symptom Y/N Comments  Symptom Y/N Comments Fever/Chills n   Chest Pain n   
Poor Appetite    Edema n   
Cough n   Abdominal Pain Sputum n   Joint Pain n   
SOB/GAMBINO n   Pruritis/Rash y   
Nausea/vomit n   Tolerating PT/OT Diarrhea    Tolerating Diet y Constipation    Other Could NOT obtain due to:   
 
Objective: VITALS:  
Last 24hrs VS reviewed since prior progress note. Most recent are: 
Patient Vitals for the past 24 hrs: 
 Temp Pulse Resp BP SpO2  
05/22/21 0757 98.3 °F (36.8 °C) (!) 102 16 (!) 134/58 96 % 05/22/21 0409 98 °F (36.7 °C) 88 18 (!) 149/95 97 % 05/22/21 0031 98.1 °F (36.7 °C) 77 18 (!) 159/85 95 % 05/21/21 2051 98.7 °F (37.1 °C) 70 18 (!) 153/67 97 % 05/21/21 1425 98.5 °F (36.9 °C) 79 18 (!) 147/72 97 % 05/21/21 1130 98.6 °F (37 °C) 77 18 123/65 96 % No intake or output data in the 24 hours ending 05/22/21 1113 I had a face to face encounter and independently examined this patient on 5/22/2021, as outlined below: PHYSICAL EXAM: 
General: WD, WN. Alert, cooperative, no acute distress EENT:  Anicteric sclerae. MMM missing teeth Resp:   no wheezing or rales. No accessory muscle use CV:  S1S2  No edema GI:  Soft, Non distended, mildly tender in epigastric area  +Bowel sounds Neurologic:  Alert, talking, sitting up in chair Psych:   . Not anxious nor agitated Skin:  No rashes. No jaundice , red splotches on face  No rash Reviewed most current lab test results and cultures  YES Reviewed most current radiology test results   YES Review and summation of old records today    NO Reviewed patient's current orders and MAR    YES 
PMH/SH reviewed - no change compared to H&P 
________________________________________________________________________ Care Plan discussed with: 
  Comments Patient x Family  x  RN x Care Manager x Consultant Multidiciplinary team rounds were held today with , nursing, pharmacist and clinical coordinator. Patient's plan of care was discussed; medications were reviewed and discharge planning was addressed. ________________________________________________________________________  
x Comments >50% of visit spent in counseling and coordination of care    
________________________________________________________________________ Naomi Pritchett MD  
 
Procedures: see electronic medical records for all procedures/Xrays and details which were not copied into this note but were reviewed prior to creation of Plan. LABS: 
I reviewed today's most current labs and imaging studies. Pertinent labs include: 
Recent Labs  
  05/20/21 
0318 WBC 7.2 HGB 11.4* HCT 34.0*  
 Recent Labs  
  05/20/21 
3449   
K 4.0  
 CO2 31  
GLU 98 BUN 16  
CREA 0.78  
CA 8.9 ALB 2.8* TBILI 0.5 ALT 24 Signed: Naomi Pritchett MD

## 2021-05-22 NOTE — PROGRESS NOTES
Problem: Mobility Impaired (Adult and Pediatric) Goal: *Acute Goals and Plan of Care (Insert Text) Description: FUNCTIONAL STATUS PRIOR TO ADMISSION: supervised household amb with RW, use of transport w/c when going out to medical appointments, supervised bathing using shower chair and hand held shower head HOME SUPPORT PRIOR TO ADMISSION: The patient lived with  who provided supervision as needed Physical Therapy Goals Initiated 5/17/2021 1. Patient will move supine<->sit with supervision/set-up within 7 day(s). 2.  Patient will transfer from bed to chair and chair to bed with supervision/set-up using the least restrictive device within 7 day(s). 3.  Patient will perform sit to stand with supervision/set-up within 7 day(s). 4.  Patient will ambulate with supervision/set-up for 75 feet with the least restrictive device within 7 day(s). 5.  Patient will ascend/descend 5 stairs with single handrail(s) with minimal assistance/contact guard assist within 7 day(s). Outcome: Progressing Towards Goal 
 PHYSICAL THERAPY TREATMENT Patient: Jerome Dillard (43 y.o. female) Date: 5/22/2021 Diagnosis: Drug-induced anaphylaxis [T78. [address] Dirve Hypotension [I95.9] Anaphylactic shock [T78. 2XXA] Precautions: Fall (R UE limb alert) Chart, physical therapy assessment, plan of care and goals were reviewed. ASSESSMENT: Patient continues with skilled PT services and is progressing towards goals, pt declined gait trng 2/2 to just returning from and walk with her  but agreed to some ther-ex. Did very well with ther-ex, continue to follow. Current Level of Function Impacting Discharge (mobility/balance): CGA x1 PLAN : Patient continues to benefit from skilled intervention to address the above impairments. Continue treatment per established plan of care 
to address goals.  
 
Recommendation for discharge: (in order for the patient to meet his/her long term goals) Therapy 3 hours per day 5-7 days per week This discharge recommendation: Has not yet been discussed the attending provider and/or case management IF patient discharges home will need the following DME: patient owns DME required for discharge OBJECTIVE DATA SUMMARY:  
 
Critical Behavior: 
Neurologic State: Alert, Eyes open spontaneously Orientation Level: Oriented X4 Cognition: Follows commands Safety/Judgement: Fall prevention, Decreased insight into deficits, Decreased awareness of need for safety, Decreased awareness of need for assistance, Decreased awareness of environment (recarkably different than baseline per family) Functional Mobility Training: 
Bed Mobility: Pt sitting in chair on arrival. 
 
Balance: 
Sitting: Intact; Without support Sitting - Static: Good (unsupported) Ambulation/Gait Training:  N/T Therapeutic Exercises:  
sitting EXERCISE Sets Reps Active Active Assist  
Passive Comments Ankle pumps 1 10 [x] [] [] bilat Heel raises 1 10 [x] [] [] \" Toe tap 1 10 [x] [] [] \" Knee ext 1 10 [x] [] [] \" Hip flex 1 10 [x] [] [] \" Abd & Add 1 10 [x] [] [] \"  
 
Pain Ratin Activity Tolerance: Fair After treatment patient left in no apparent distress: Sitting in chair, Call bell within reach, and Caregiver / family present COMMUNICATION/COLLABORATION:  
The patients plan of care was discussed with: Registered nurse. Luli Khan PTA Time Calculation: 18 mins

## 2021-05-23 NOTE — PROGRESS NOTES
0730-Report taken from [place]. Patient resting in bed at this time 1900- End of Shift Note Bedside shift change report given to  (oncoming nurse) by Nemo Cox (offgoing nurse). Report included the following information SBAR, Kardex, Intake/Output, MAR and Recent Results Shift worked:  7a-7p Shift summary and any significant changes:  
  motrin given for headache,  
  
Concerns for physician to address:  d/c when able to SNF Zone phone for oncoming shift:    
  
 
Activity: 
Activity Level: Up with Assistance Number times ambulated in hallways past shift: 0 Number of times OOB to chair past shift: 2 Cardiac:  
Cardiac Monitoring: Yes     
Cardiac Rhythm: Sinus Rhythm, Sinus Tachy Access:  
Current line(s): PIV Genitourinary:  
Urinary status: voiding Respiratory:  
O2 Device: None (Room air) Chronic home O2 use?: NO Incentive spirometer at bedside: YES 
  
GI: 
Last Bowel Movement Date: 05/23/21 Current diet:  DIET CARDIAC Regular Passing flatus: YES Tolerating current diet: YES Pain Management:  
Patient states pain is manageable on current regimen: YES Skin: 
Anmol Score: 19 Interventions: increase time out of bed Patient Safety: 
Fall Score: Total Score: 2 Interventions: assistive device (walker, cane, etc) and gripper socks High Fall Risk: Yes Length of Stay: 
Expected LOS: 3d 0h 
Actual LOS: 6 Nemo Cox

## 2021-05-23 NOTE — PROGRESS NOTES
I reviewed the Echo here and reviewed our office chart. She is a patient of Dr Bayron Duran. She had normal Nuclear in 2016. Normal LV function . She has not had an echo in the office. She has a LBBB In reviewing the images of her echo here,  I think she has an abnormal contraction pattern with hypokinesis of the Septum due to the LBBB. Otherwise I think it is fine Without cardiac symptoms , I would not pursue further cardiac studies. She can keep her follow up with Dr Ciarra Gordillo. Discussed with Dr Michael Durant

## 2021-05-23 NOTE — PROGRESS NOTES
Problem: Falls - Risk of 
Goal: *Absence of Falls Description: Document Ewa Marcus Fall Risk and appropriate interventions in the flowsheet. Outcome: Progressing Towards Goal 
Note: Fall Risk Interventions: 
Mobility Interventions: Communicate number of staff needed for ambulation/transfer, Patient to call before getting OOB Mentation Interventions: Reorient patient Medication Interventions: Patient to call before getting OOB Elimination Interventions: Call light in reach, Patient to call for help with toileting needs History of Falls Interventions: Room close to nurse's station Problem: Patient Education: Go to Patient Education Activity Goal: Patient/Family Education Outcome: Progressing Towards Goal 
  
Problem: General Infection Care Plan (Adult and Pediatric) Goal: Improvement in signs and symptoms of infection Outcome: Progressing Towards Goal 
Goal: *Optimize nutritional status Outcome: Progressing Towards Goal 
  
Problem: Patient Education: Go to Patient Education Activity Goal: Patient/Family Education Outcome: Progressing Towards Goal 
  
Problem: Patient Education: Go to Patient Education Activity Goal: Patient/Family Education Outcome: Progressing Towards Goal 
  
Problem: Patient Education: Go to Patient Education Activity Goal: Patient/Family Education Outcome: Progressing Towards Goal 
 Patient is alert and oriented; able to voice needs. Denies any pain or discomfort. No acute distress noted.

## 2021-05-24 NOTE — PROGRESS NOTES
Problem: Self Care Deficits Care Plan (Adult) Goal: *Acute Goals and Plan of Care (Insert Text) Description:  
FUNCTIONAL STATUS PRIOR TO ADMISSION: Lives with spouse who reports that current presentation is ~ 50% changed from baseline; She is mod I in the home with ADLs and functional mobility with RW, S for higher med management; assists with meal prep with and without use of RW; she reports she drives occasionally; ( did not dispute) Uses walk in shower with chair and HHS and S from spouse. HOME SUPPORT: The patient lived with spouse who assisted PRN, occasioanlly with socks and shoes if back or B Knees arthritis was bothering her too much. Occupational Therapy Goals 5/24/2021:  All goals are appropriate to continue for 7 days Initiated 5/17/2021 1. Patient will perform grooming in standing VSS with supervision/set-up within 7 day(s). 2.  Patient will perform upper body dressing with modified independence within 7 day(s). 3.  Patient will perform lower body dressing with supervision/set-up within 7 day(s). 4.  Patient will perform toilet transfers with supervision/set-up within 7 day(s). 5.  Patient will perform all aspects of toileting with supervision/set-up within 7 day(s). 6.  Patient will participate in upper extremity therapeutic exercise/activities with supervision/set-up for 5 minutes within 7 day(s). 7.  Patient will utilize energy conservation, fall prevention and pain management techniques during functional activities with verbal cues within 7 day(s). Outcome: Progressing Towards Goal 
 OCCUPATIONAL THERAPY TREATMENT Patient: Bertha Sawyer (24 y.o. female) Date: 5/24/2021 Diagnosis: Drug-induced anaphylaxis [T78. [address] [place] Hypotension [I95.9] Anaphylactic shock [T78. 2XXA] <principal problem not specified> Precautions: Fall (R UE limb alert) Chart, occupational therapy assessment, plan of care, and goals were reviewed. ASSESSMENT Patient continues with skilled OT services and is progressing towards goals. Pt does not c/o a headache this date and was able to perform adl mobility with CGA. Pt looks to her  for answers to questions, orientation, and for lower body adls assistance. Pt is progressing towards goals that are appropriate to continue. Pt continues to need assist with basic self care tasks. She will benefit from rehab at discharge. Current Level of Function Impacting Discharge (ADLs): set up to maximal assistance for self care. CGA for adl mobility Other factors to consider for discharge:  is able to assist 
    
 
PLAN : 
Patient continues to benefit from skilled intervention to address the above impairments. Continue treatment per established plan of care to address goals. Recommend with staff: encourage OOB and participation in self care tas Recommendation for discharge: (in order for the patient to meet his/her long term goals) Therapy up to 5 days/week in SNF setting This discharge recommendation: 
Has not yet been discussed the attending provider and/or case management IF patient discharges home will need the following DME: tbd at rehab SUBJECTIVE:  
Patient stated Jay Dennison was 12 years ago.   she is confused re: an events time frame. OBJECTIVE DATA SUMMARY:  
Cognitive/Behavioral Status: 
Neurologic State: Alert Orientation Level: Oriented to person;Oriented to place Cognition: Decreased attention/concentration; Follows commands; Impaired decision making (looks to her  for info and decision making-) Perception: Appears intact (narrow vision-limited in periphery-bumps into things, or griffin) Safety/Judgement: Decreased awareness of environment;Decreased awareness of need for assistance;Decreased awareness of need for safety; Fall prevention Functional Mobility and Transfers for ADLs: 
Bed Mobility: Pt was seated inchair upon arrival 
Transfers: 
Sit to Stand: Contact guard assistance Balance: 
Sitting: Intact Sitting - Static: Good (unsupported) Standing: Intact; With support Standing - Static: Good;Occasional 
Standing - Dynamic : Good;Occasional 
 
ADL Intervention: 
  
 
Grooming Grooming Assistance: Supervision Position Performed: Standing Washing Hands: Supervision;Stand-by assistance Lower Body Dressing Assistance Dressing Assistance:  (seated in chair) Socks: Maximum assistance Leg Crossed Method Used: Yes Position Performed: Seated in chair Cues:  (attempted) Toileting Toileting Assistance: Supervision;Stand-by assistance Bladder Hygiene: Supervision;Stand-by assistance Clothing Management: Supervision (Close S due to impaired balance) Cues: Verbal cues provided Adaptive Equipment: Grab bars; George Litten Cognitive Retraining Safety/Judgement: Decreased awareness of environment;Decreased awareness of need for assistance;Decreased awareness of need for safety; Fall prevention Activity Tolerance:  
Good  did not c/o fatigue nor pain After treatment patient left in no apparent distress:  
Sitting in chair, Call bell within reach, and  present and reports plan for d/c to rehab today COMMUNICATION/COLLABORATION:  
The patients plan of care was discussed with: Physical therapist and Registered nurse. PETER Lentz/GURMEET Time Calculation: 31 mins

## 2021-05-24 NOTE — DISCHARGE INSTRUCTIONS
HOSPITALIST DISCHARGE INSTRUCTIONS    NAME: Rahat Khan   :  1940   MRN:  [id number]     Date/Time:  2021 2:18 PM    ADMIT DATE: 5/15/2021     DISCHARGE DATE: 2021     Attending Physician: Naomi Pritchett MD    DISCHARGE DIAGNOSIS:    Anaphylaxis due to keflex    Metabolic encephalopathy due to steroids, benadryl    Migraine headaches      Medications: Per above medication reconciliation. Pain Management: per above medications    Recommended diet: Cardiac Diet    Recommended activity: Activity as tolerated    Wound care: None    Indwelling devices:  None    Supplemental Oxygen: None    Required Lab work: Per SNF routine    Glucose management:  None    Code status: Full        Outside physician follow up: Follow-up Information     Follow up With Specialties Details Why Radha JOHNSTON [address]   [address] R [facility] 53    Hao Jo MD Family Medicine Schedule an appointment as soon as possible for a visit in 1 week after discharge from [facility] [address]  [phone]      Mady Lindsey MD Cardiology Schedule an appointment as soon as possible for a visit in 3 weeks cardiology follow up Ronni [address]  [phone]          No penicillins or cephalosporin antibiotics, do not take the amoxacillin, talk with PCP about using a different antibiotic       Skilled nursing facility/ SNF MD responsible for above on discharge. Information obtained by :  I understand that if any problems occur once I am at home I am to contact my physician. I understand and acknowledge receipt of the instructions indicated above.                                                                                                                                            Physician's or R.N.'s Signature Date/Time                                                                                                                                              Patient or Repres

## 2021-05-24 NOTE — PROGRESS NOTES
End of Shift Note Bedside shift change report given to [place], RN   (oncoming nurse) by Leti Reyes RN (offgoing nurse). Report included the following information SBAR, Kardex, Intake/Output, MAR and Recent Results Shift worked:  7p-7a Shift summary and any significant changes:  
 Uneventful shift for patient. Ambulated to restroom several times with walker and standby assist. Larey Adriana to make needs known. Denies pain. Concerns for physician to address:  N/A Zone phone for oncoming shift:   [id number] Activity: 
Activity Level: Up with Assistance (Standby) Number times ambulated in hallways past shift: 0 Number of times OOB to chair past shift: 0 Cardiac:  
Cardiac Monitoring: Yes     
Cardiac Rhythm: Sinus Rhythm Access:  
Current line(s): PIV Genitourinary:  
Urinary status: voiding Respiratory:  
O2 Device: None (Room air) Chronic home O2 use?: NO Incentive spirometer at bedside: YES 
  
GI: 
Last Bowel Movement Date: 05/23/21 Current diet:  DIET CARDIAC Regular Passing flatus: YES Tolerating current diet: YES Pain Management:  
Patient states pain is manageable on current regimen: YES Skin: 
Anmol Score: 18 Interventions: increase time out of bed Patient Safety: 
Fall Score: Total Score: 4 Interventions: bed/chair alarm, assistive device (walker, cane, etc), gripper socks and pt to call before getting OOB High Fall Risk: Yes Length of Stay: 
Expected LOS: 3d 0h 
Actual LOS: 7 Leti Reyes RN

## 2021-05-24 NOTE — PROGRESS NOTES
Sbar report called [employer]. To nurse Brooklynn Rasheed RN with opportunity for question and clarification. 15:05 pt d/greta to [facility]. Via ambulance ,v/s stable, pt. A&0 X4, family at bedside offered no c/o pain.

## 2021-05-24 NOTE — PROGRESS NOTES
Problem: Mobility Impaired (Adult and Pediatric) Goal: *Acute Goals and Plan of Care (Insert Text) Description: FUNCTIONAL STATUS PRIOR TO ADMISSION: supervised household amb with RW, use of transport w/c when going out to medical appointments, supervised bathing using shower chair and hand held shower head HOME SUPPORT PRIOR TO ADMISSION: The patient lived with  who provided supervision as needed Physical Therapy Goals Initiated 5/17/2021 1. Patient will move supine<->sit with supervision/set-up within 7 day(s). 2.  Patient will transfer from bed to chair and chair to bed with supervision/set-up using the least restrictive device within 7 day(s). 3.  Patient will perform sit to stand with supervision/set-up within 7 day(s). 4.  Patient will ambulate with supervision/set-up for 75 feet with the least restrictive device within 7 day(s). 5.  Patient will ascend/descend 5 stairs with single handrail(s) with minimal assistance/contact guard assist within 7 day(s). Outcome: Progressing Towards Goal 
 PHYSICAL THERAPY TREATMENT Patient: Rahat Khan (41 y.o. female) Date: 5/24/2021 Diagnosis: Drug-induced anaphylaxis [T78. [address] [place] Hypotension [I95.9] Anaphylactic shock [T78. 2XXA] <principal problem not specified> Precautions: Fall (R UE limb alert) Chart, physical therapy assessment, plan of care and goals were reviewed. ASSESSMENT Patient continues with skilled PT services and is progressing towards goals. Patient is sitting in chair upon arrival, agreeable to PT session. Completes sit<>stand with CGA and cuing for proper hand placement. Ambulation x100ft CGA FWW with consistent cuing for keeping walker closer to BRENT and increase awareness of surroundings. Patient running into objects on R 3x while ambulating requiring cuing for increasing awareness. Patient requiring for orientation to year with patient requiring increased time to come to correct answer.  Continue to recommend SNF level rehab upon d/c. Current Level of Function Impacting Discharge (mobility/balance): CGA PLAN : 
Patient continues to benefit from skilled intervention to address the above impairments. Continue treatment per established plan of care. to address goals. Recommendation for discharge: (in order for the patient to meet his/her long term goals) Therapy up to 5 days/week in SNF setting This discharge recommendation: 
Has been made in collaboration with the attending provider and/or case management IF patient discharges home will need the following DME: to be determined (TBD) SUBJECTIVE:  
Patient stated I have a little bit of a headache but not like last week.  OBJECTIVE DATA SUMMARY:  
Critical Behavior: 
Neurologic State: Alert, Appropriate for age Orientation Level: Appropriate for age, Oriented X4 Cognition: Appropriate decision making, Follows commands Safety/Judgement: Fall prevention, Decreased insight into deficits, Decreased awareness of need for safety, Decreased awareness of need for assistance, Decreased awareness of environment (recarkably different than baseline per family) Functional Mobility Training: 
Transfers: 
Sit to Stand: Contact guard assistance Stand to Sit: Contact guard assistance Balance: 
Sitting: Intact Sitting - Static: Good (unsupported) Standing: Intact; With support Standing - Static: Good;Occasional 
Standing - Dynamic : Good;Occasional 
Ambulation/Gait Training: 
Distance (ft): 100 Feet (ft) Assistive Device: Walker, rolling;Gait belt Ambulation - Level of Assistance: Contact guard assistance Gait Abnormalities: Decreased step clearance Base of Support: Narrowed Speed/Denae: Slow Step Length: Right shortened;Left shortened Activity Tolerance:  
Fair After treatment patient left in no apparent distress:  
Sitting in chair, Call bell within reach, Bed / chair alarm activated and Caregiver / family present COMMUNICATION/COLLABORATION:  
The patients plan of care was discussed with: Occupational therapist and Registered nurse. Stanislav Harden Time Calculation: 18 mins

## 2021-05-24 NOTE — PROGRESS NOTES
Hospitalist Progress Note NAME: Anuja Bates :  1940 MRN:  [id number] Discharge date: ? 21 Barriers: Awaiting insurance auth from [place] for SNF, acute rehab denied Assessment / Plan: 
 
Acute metabolic encephalopathy POA ? Steroids, improved Acute delirium POA ? Baseline dementia POA Left sided headaches likely migraines 
- has some memory issues per  but has had increased confusion after admit 
- initially impulsive, confused now improved 
- likely related to high dose steroids , epinephrine use, benadryl, hospital environment 
- Head CT neg ,  MRI head Neg  
- no safety observer for over 24 hours - MS improved, ERNST earlier, now resolved with tylenol 
- Head CT and brain MRI are negative except atrophy 
- COVID-19 PCR negative - prior migraines, intermittent left sided headaches controlled with motrin - Awaiting insurance auth for SNF, medically stable for discharge Elevated troponin 0.16 trending down Chronic systolic CHF LVEF 81-03% POA Chronic left BBB Follows with Brian Roldan at [facility], denies prior CHF Clinically compensated No current CP, no prior echos EKG with left BBB and serial enzymes trending down Echo LVEF 30-35% ASA, lipitor, zetia Change metoprolol to coreg Appreciate Dr Yin Padron help, felt echo abnormal with LLL, otherwise normal 
     Cardiac function. Felt no cardiac testing needed now, refer back to Dr Fransisco Tang pBNP 313 Recurrent episodes of N/V -, now resolved Right thigh pain POA Normal LFTs, lipase KUB with no free air or obstructiion RUQ US normal 
LE doppler right leg with no DVT Right knee and thigh  x-rays negative PRN zofran N/V resolved, continue PPI Leukocytosis suspect steroid effect, now resolved 
- off steroids > 24 hours now, improved - Procalcitonin negative - No fevers Anaphylaxis due to reaction to Keflex POA- resolved Causing Hypotension POA-now resolved in ED status post epinephrine, IV fluids Transient Sinus tachycardia POA-as high as in 180/min S/p code stroke in ER - hypotensive in field with facial droop , dorene arm weakness 
-Rash on face and body resolved  
- states has taken Keflex before without incident - Sbp now 160-170s  will cont home meds of metoprolol - symptoms resolved in ER Hypokalemia POA- mild K+ 3.0 on admission now at 3.8 Abnormal UA POA 
- No urine culture sent - UA unremarkable on admission - Hx of UTIs - exhibited by odor , itching and dysuria  
- no dysuria , odor or itching on admission Hypertension  
- cont Metoprolol and HCTZ per home meds - Sbp 150-160s Code status: Full Prophylaxis: Lovenox Recommended Disposition: Home w/Family Subjective: Chief Complaint / Reason for Physician Visit f/u anaphylaxis \"headache along left side of head is back today\" No new complaints HA recurred this AM 
No further N/V, eating ice cream 
No CO or SOB Awaiting SNF placement Denies pain or complaints Review of Systems: 
Symptom Y/N Comments  Symptom Y/N Comments Fever/Chills n   Chest Pain n   
Poor Appetite    Edema n   
Cough n   Abdominal Pain Sputum n   Joint Pain n   
SOB/GAMBINO n   Pruritis/Rash y   
Nausea/vomit n   Tolerating PT/OT Diarrhea    Tolerating Diet y Constipation    Other Could NOT obtain due to:   
 
Objective: VITALS:  
Last 24hrs VS reviewed since prior progress note. Most recent are: 
Patient Vitals for the past 24 hrs: 
 Temp Pulse Resp BP SpO2  
05/23/21 2017 98.3 °F (36.8 °C) 82 16 (!) 134/54 94 % 05/23/21 1550 97.9 °F (36.6 °C) 87 16 121/72 94 % 05/23/21 1208 98.2 °F (36.8 °C) 85 16 127/74 92 % 05/23/21 0817 98.5 °F (36.9 °C) (!) 101 16 (!) 157/85 92 % 05/23/21 0430 98.7 °F (37.1 °C) 75 16 (!) 152/67 96 % No intake or output data in the 24 hours ending 05/23/21 2057 I had a face to face encounter and independently examined this patient on 5/23/2021, as outlined below: PHYSICAL EXAM: 
General: WD, WN. Alert, cooperative, no acute distress EENT:  Anicteric sclerae. MMM missing teeth Resp:   no wheezing or rales. No accessory muscle use CV:  S1S2  No edema GI:  Soft, Non distended, mildly tender in epigastric area  +Bowel sounds Neurologic:  Alert, talking, sitting up in chair Psych:   . Not anxious nor agitated Skin:  No rashes. No jaundice , red splotches on face  No rash Reviewed most current lab test results and cultures  YES Reviewed most current radiology test results   YES Review and summation of old records today    NO Reviewed patient's current orders and MAR    YES 
PMH/ reviewed - no change compared to H&P 
________________________________________________________________________ Care Plan discussed with: 
  Comments Patient x Family  x  RN x Care Manager x Consultant Multidiciplinary team rounds were held today with , nursing, pharmacist and clinical coordinator. Patient's plan of care was discussed; medications were reviewed and discharge planning was addressed. ________________________________________________________________________  
x Comments >50% of visit spent in counseling and coordination of care    
________________________________________________________________________ Andie Richard MD  
 
Procedures: see electronic medical records for all procedures/Xrays and details which were not copied into this note but were reviewed prior to creation of Plan. LABS: 
I reviewed today's most current labs and imaging studies. Pertinent labs include: 
Recent Labs  
  05/23/21 
0354 WBC 6.2 HGB 11.0*  
HCT 31.9*  
 Recent Labs  
  05/23/21 
0354   
K 4.0  
 CO2 30 * BUN 20  
CREA 0.78 CA 9.0 Signed: Andie Richard MD

## 2021-05-24 NOTE — PROGRESS NOTES
Problem: Falls - Risk of 
Goal: *Absence of Falls Description: Document Clover Mccall Fall Risk and appropriate interventions in the flowsheet. Outcome: Progressing Towards Goal 
Note: Fall Risk Interventions: 
Mobility Interventions: Utilize walker, cane, or other assistive device Mentation Interventions: Reorient patient, Bed/chair exit alarm Medication Interventions: Bed/chair exit alarm, Patient to call before getting OOB, Teach patient to arise slowly Elimination Interventions: Bed/chair exit alarm, Call light in reach, Toileting schedule/hourly rounds History of Falls Interventions: Bed/chair exit alarm, Room close to nurse's station Problem: Patient Education: Go to Patient Education Activity Goal: Patient/Family Education Outcome: Progressing Towards Goal 
  
Problem: General Infection Care Plan (Adult and Pediatric) Goal: Improvement in signs and symptoms of infection Outcome: Progressing Towards Goal 
Goal: *Optimize nutritional status Outcome: Progressing Towards Goal 
  
Problem: Patient Education: Go to Patient Education Activity Goal: Patient/Family Education Outcome: Progressing Towards Goal 
  
Problem: Patient Education: Go to Patient Education Activity Goal: Patient/Family Education Outcome: Progressing Towards Goal 
  
Problem: Patient Education: Go to Patient Education Activity Goal: Patient/Family Education Outcome: Progressing Towards Goal

## 2021-05-24 NOTE — PROGRESS NOTES
Transition of Care Plan: 
 
RUR: 11% Disposition:SNF Placement Follow up appointments:PCP follow up DME needed: TBD by therapist  
Transportation at Discharge:[facility] [place] or means to access home:      Family to have [address] to access home  Medicare letter: 2nd  Medicare Letter to be given Caregiver Contact: Arthea Blizzard (spouse) [phone] Discharge Caregiver contacted prior to discharge? Family to be contacted at the time of d/c. UPDATE: 11:45AM 
 
CM informed that pt auth was approved for snf placement. Pt's insurance [employer] ref number is: [id number]. CM informed that auth begin today, and pt's renewal date is: 5/26/21. CM will inform physician and nurse of the following. CM made contact with sn[facility]: [facility], to confirm acceptance, and facility is ready to receive pt on today. CM will contact pt's spouse, regarding the following. SILVINA Hammond, [address] [facility] SOLITARIO contacted [employer] ([phone]), via telephone to verify if pt insurance [employer] was approved, from the clinicals that were sent for auth on 5/21/21. SOLITARIO informed from [facility] [employer] representative that pt's [employer] Ch[employer] is still under review and that CM should receive a response on today. CM will inform physician of the following. CM contacted physician, via telephone regarding pt's insurance auth that's currently pending. CM informed physician that she will place pt transport on will call, via [facility]. CM will enter delay, CM will continue to follow. SILVINA Hammond, [address] [facility]

## 2021-05-24 NOTE — PROGRESS NOTES
Transition of Care Plan to SNF/Rehab Communication to Patient/Family: Met with patient and family and they are agreeable to the transition plan. The Plan for Transition of Care is related to the following treatment goals:  
 
CM informed that pt has been approved, by [facility]: [facility]. CM spoke with pt's spouse: Saqib Richards, via telephone to inform him of the following. Saqib Richards is agreeable to transition for pt. CM informed Saqib Richards that transport has been scheduled for 2:30PM.  CM informed physician of the following. The Patient and/or patient representative was provided with a choice of provider and agrees  with the discharge plan. Yes [x] No [] A Freedom of choice list was provided with basic dialogue that supports the patient's individualized plan of care/goals and shares the quality data associated with the providers. Yes [x] No [] SNF/Rehab Transition: 
Patient has been accepted to [facility] SN[facility]/Rehab and meets criteria for admission. Patient will transported by [facility] and expected to leave at 2:30PM. Communication to SNF/Rehab: 
Bedside RN, Gen Surg Nurse, has been notified to update the transition plan to the facility and call report ([phone]). Discharge information has been updated on the AVS. And communicated to facility via [employer], or CC link. Discharge instructions to be fax'd to facility at [facility] #). [] BCPI-A Patient has been identified as part of the  [employer].  For Care Coordination associated with that Bundle Program, please contact Bundle information has been communication to. Nursing Please include all hard scripts for controlled substances, med rec and dc summary, and AVS in packet.   
 
Reviewed and confirmed with facility, [facility], can manage the patient care needs for the following:  
 
Frank Munguia with (X) only those applicable: 
Medication: 
[x]Medications are available at the facility []IV Antibiotics []Controlled Substance  hard copies available sent. []Weekly Labs Equipment: 
[]CPAP/BiPAP []Wound Vacuum []Rossi or Urinary Device []PICC/Central Line []Nebulizer []Ventilator Treatment: 
[]Isolation (for MRSA, VRE, etc.) []Surgical Drain Management []Tracheostomy Care 
[]Dressing Changes []Dialysis with transportation []PEG Care []Oxygen []Daily Weights for Heart Failure Dietary: 
[]Any diet limitations []Tube Feedings []Total Parenteral Management (TPN) Financial Resources: 
[]Medicaid Application Completed []UAI Completed and copy given to pt/family 
and copy given to pt/family 
[]A screening has previously been completed. []Level II Completed 
 
[] Private pay individual who will not become  
financially eligible for Medicaid within 6 months from admission to a [address] [place] [address] facility. [x] Individual refused to have screening conducted. []Medicaid Application Completed []The screening denied because it was determined individual did not need/did not qualify for nursing facility level of care. [] Out of state residents seeking direct admission to a [address] facility. [] Individuals who are inpatients of an out of state hospital, or in state or out of state veterans/ hospital and seek direct admission to a [address] facility [] Individuals who are pateints or residents of a state owned/operated facility that is licensed by Department of [employer] ([employer]) and seek direct admission to [address] facility [] A screening not required for enrollment in [facility] services as set out in 12 VAC 30- [] [facility]) staff shall perform screenings of the [facility] clients. Advanced Care Plan: 
[]Surrogate Decision Maker of Care 
[]POA []Communicated Code Status and copy sent. Other:

## 2021-05-27 NOTE — DISCHARGE SUMMARY
Hospitalist Discharge Note NAME: Justo Calloway :  1940 MRN:  [id number] Admit date: 5/15/2021 Discharge date: 21 PCP: Teresita Lowery MD 
 
Discharge Diagnoses: Anaphylaxis due to reaction to Keflex POA requiring epinephrine Hypotension 72/37 due to anaphylaxis  POA Transient Sinus tachycardia to 180's POA 
 
S/p code stroke in ER. hypotensive in field with facial droop , dorene arm weakness Acute metabolic encephalopathy ? Steroids and benadryl, improved Acute delirium Left sided headaches likely migraines Elevated troponin 0.16 trending down Chronic systolic CHF LVEF 05-58% POA ? Real vs effect of Left BBB Chronic left BBB Recurrent episodes of N/V -, now resolved Right thigh pain POA likely musculoskeletal 
 
Leukocytosis suspect steroid effect Hypokalemia POA Essential Hypertension Code status: Full Discharge Medications: 
 
Current Outpatient Medications Medication Sig  furosemide (LASIX) 20 mg tablet Take 1 Tablet by mouth daily.  ibuprofen (MOTRIN) 400 mg tablet Take 1 Tablet by mouth every eight (8) hours as needed for Pain.  pantoprazole (PROTONIX) 40 mg tablet Take 1 Tablet by mouth Before breakfast and dinner.  polyethylene glycol (MIRALAX) 17 gram packet Take 1 Packet by mouth daily.  ZINC PO Take  by mouth.  potassium chloride SA (MICRO-K) 10 mEq capsule Take 10 mEq by mouth two (2) times a day.  magnesium 250 mg tab Take  by mouth.  fish oil-omega-3 fatty acids (Fish Oil) 340-1,000 mg capsule Take 1 Cap by mouth daily.  albuterol (PROVENTIL HFA, VENTOLIN HFA, PROAIR HFA) 90 mcg/actuation inhaler Take 2 Puffs by inhalation every six (6) hours as needed for Wheezing.  ezetimibe (Zetia) 10 mg tablet Take 10 mg by mouth daily.  atorvastatin (LIPITOR) 20 mg tablet Take 20 mg by mouth nightly.  doxazosin (CARDURA) 1 mg tablet Take  by mouth daily.  MULTIVITAMIN PO Take  by mouth daily.  metoprolol (LOPRESSOR) 50 mg tablet Take 1 Tab by mouth two (2) times a day. (Patient taking differently: Take 25 mg by mouth two (2) times a day.)  aspirin 81 mg tablet Take 81 mg by mouth daily.  calcium carbonate 1,000 mg Tab Take 1 Tab by mouth daily. Follow-up Information Follow up With Specialties Details Why Contact Info [address] [facility] [place]   [address] [place] [address]   [address] [id number] 
[phone] John Etienne MD Family Medicine Schedule an appointment as soon as possible for a visit in 1 week after discharge from [facility] [address] 
[address]  Charisma Steve MD Cardiology Schedule an appointment as soon as possible for a visit in 3 weeks cardiology follow up [address] [facility] [address] 
[phone] Time spent on discharge:   I spent greater than 30 minutes on discharge, seeing and examining the patient, reconciling home meds and new meds, coordinating care with case management, doing the discharge papers and the D/C summary Discharge disposition: SNF Discharge Condition: Stable Summary of admission H+P(copied from Dr Braxton Hazel Note): CHIEF COMPLAINT: Allergic reaction due to Keflex prescribed for UTI at an urgent care today 
  
HISTORY OF PRESENT ILLNESS:    
Kira Maguire is a 78 y.o.  female who presents with above complaints from home via EMS with . Patient present with chief complaint of sudden onset of rash over her neck and itching all over with associated nausea and vomiting witnessed by EMS on arrival 20 minutes prior to arrival at ED. History of taking Keflex prescribed earlier in the day and in urgent care for suspected UTI. History of allergic reaction/rash with amoxicillin in the past. 
Patient was found to have severe hypotension as per EMS and 60s for which patient was started on IV fluids in the field and as they were moving her to the stretcher they noticed that she had left-sided facial droop with some left arm numbness and tingling--patient was brought in as a code stroke along with anaphylaxis. Patient apparently was at her baseline when seen by ED physician on arrival-was treated for anaphylactic shock with epinephrine and IV fluid boluses with IV Solu-Medrol and Benadryl-blood pressure improved quickly with elevated lactic acidosis due to severe hypotension on work-up in the ED. Patient apparently had another episode of questionable left-sided facial droop for which telemetry neurology was consulted and an MRI of the brain was done stat which ruled out any stroke. 
  
Patient has since remained normotensive with some tachycardia as a residual of effect of epinephrine given earlier for anaphylaxis. 
  
We were asked to admit for work up and evaluation of the above problems.  
  
    
Past Medical History:  
Diagnosis Date  Arrhythmia    
  LBBB  Arthritis    
  SPINE  Axillary adenopathy 3/24/2021  Calculus of kidney 1990  Cancer [facility])    
  right breast X2 ; BCCA  Environmental allergies    
 GERD (gastroesophageal reflux disease)    
 Hypercholesterolemia    
 Hypertension    
 Nausea & vomiting    
 Other ill-defined conditions(799.89)    
  high cholesterol  Other ill-defined conditions(799.89)    
  NO BP/VENIPUNCTURES RIGHT ARM (POST LYMPH NODE DISSECTION)  Other ill-defined conditions(799.89)    
  MITRAL VALVE PROLAPSE  Recurrent malignant melanoma of skin ([facility] 9/23/2020  Stroke [facility])    
  TIA 2009  (TAKING ASA) Admit pCXR read by radiology results: 
Portable AP upright view of the chest obtained, comparison October 23, 2020. The 
 heart size is normal. No acute infiltrate. impression: Negative. Head CT read by radiology FINDINGS: 
There is no extra-axial fluid collection hemorrhage or shift.  Prominent atrophy 
and nonspecific white matter changes, no mass. IMPRESSION No acute changes. Brain MRI read by radiology FINDINGS: 
Diffusion imaging does not show acute ischemic changes. There is progression of atrophy and nonspecific white matter changes with since 
the prior examination, those findings are prominent at this time. There is no 
extra-axial fluid collection, hemorrhage or shift of the midline no masses. Flow 
voids in major vessels at the base of the brain are present. No enhancing lesion or masses her. IMPRESSION Prominent atrophy and white matter disease. Progression since the 
prior examination, no acute findings. Echo TTE read by cardiology Left Ventricle Normal cavity size. Mild concentric hypertrophy. The estimated EF is 30 - 35%. Visually measured ejection fraction. Moderately reduced systolic function. Abnormal left ventricular septal motion. Left Atrium Normal cavity size. Right Ventricle Normal cavity size and global systolic function. Right Atrium Normal cavity size. Aortic Valve Trileaflet valve structure and no stenosis. Trace aortic valve regurgitation. Mitral Valve Normal valve structure and no stenosis. Trace regurgitation. Tricuspid Valve Tricuspid valve not well visualized. No stenosis. Trace regurgitation. Pulmonic Valve Pulmonic valve not well visualized. No stenosis and no regurgitation. Aorta Normal aortic root, ascending aortic, and aortic arch. Pulmonary Artery Pulmonary hypertension not suggested by Doppler findings. Pericardium Normal pericardium and no evidence of pericardial effusion. KUB 5/18 read by radiology FINDINGS:  
Supine and upright views of the abdomen demonstrate no dilated loops 
of large small bowel. . There is no free intraperitoneal air. No soft tissue 
masses or pathologic calcifications are seen. The bones and soft tissues show 
levoscoliosis lumbar spine with degenerative spine changes and diffuse 
osteopenia but otherwise show no acute abnormality. IMPRESSION No acute findings. Right knee X-ray 5/19 read by radiology FINDINGS:  
Two views of the right knee demonstrate no fracture or other acute 
osseous or articular abnormality. There is a mild sized joint effusion. Mild DJD 
with joint space narrowing meniscal calcification and prominent DJD of the 
patella. IMPRESSION No acute abnormality. Right thigh 5/19 x-ray read by radiology FINDINGS:  
Two views of the right femur demonstrate no fracture or other acute 
osseous, articular or soft tissue abnormality. Osteoarthritic changes are noted. IMPRESSION No acute abnormality. Right lower extremity doppler US read by radiology Right Lower Venous No evidence of deep vein thrombosis in the common femoral, profunda femoral, femoral, popliteal, posterior tibial, and peroneal veins. The veins were imaged in the transverse and longitudinal planes. The vessels showed normal color filling and compressibility. Doppler interrogation showed phasic and spontaneous flow. US abdomen read by radiology FINDINGS: 
LIVER:  
The liver is diffusely increased in echogenicity. There is a stable cyst with a 
single thin septation in the left hepatic lobe measuring 1.4 x 0.9 cm. There is 
a stable simple cyst in the right hepatic lobe measuring 2.0 x 1.9 cm. LIVER VASCULATURE: The portal vein flow is patent with appropriate directional flow. [place] [employer] GALLBLADDER: 
The gallbladder is normal and no stones are identified. There is no wall 
thickening or fluid around the gallbladder. COMMON BILE DUCT: 
There is no biliary duct dilatation and the common duct measures 3 mm in 
diameter. PANCREAS: 
The visualized pancreas is normal. 
RIGHT KIDNEY: 
The right kidney measures 9.2 cm in length. The right kidney demonstrates normal 
echogenicity with no contour deforming mass. No hydronephrosis. IMPRESSION 1. No acute abnormality. 2. Hepatic steatosis. 3. Simple cysts in the liver. 
[facility] course: Anaphylaxis due to reaction to Keflex POA requiring epinephrine Causing Hypotension 72/37  POA Transient Sinus tachycardia to 180's POA ? epinephrine S/p code stroke in ER. hypotensive in field with facial droop , dorene arm weakness 
- Onset after dose of keflex for UTI 
- Rash on face and body resolved  
- states has taken Keflex before without incident - Sbp now 160-170s  will cont home meds of metoprolol  
- brief course of steroids, benadryl and pepcid after admit, then confused, so stopped 
- Neuro exam non focal once BP improved - Brain MRI was negative Acute metabolic encephalopathy POA ? Steroids, improved Acute delirium POA ? Baseline dementia POA Left sided headaches likely migraines 
- has some memory issues per  but has had increased confusion after admit 
- initially impulsive, confused now improved 
- likely related to high dose steroids , epinephrine use, benadryl, hospital environment 
- Head CT neg ,  MRI head Neg  
- briefly required safety observe - MS improved, HA earlier, now resolved with tylenol 
- Head CT and brain MRI are negative except atrophy 
- COVID-19 PCR negative 
- known history of migraines, intermittent left sided headaches controlled with motrin - MR in 2010 with no aneurysms Elevated troponin 0.16 trending down Chronic systolic CHF LVEF 97-65% POA Chronic left BBB Follows with Brittany Ross at [facility], denies prior CHF Clinically compensated No current CP, no prior echos EKG with left BBB and serial enzymes trending down Echo LVEF 30-35% ASA, lipitor, zetia Change metoprolol to coreg Appreciate Dr Shine Shipley help, he reviewed echo abnormal with LLL, otherwise 
        normal cardiac function. Felt no cardiac testing needed, refer back to Dr Sheila Canales pBNP 313 Recurrent episodes of N/V 5/18-5/19, now resolved Right thigh pain POA Normal LFTs, lipase KUB with no free air or obstructiion RUQ US normal 
LE doppler right leg with no DVT Right knee and thigh  x-rays negative PRN Lindsay Francois N/V resolved, continue PPI Leukocytosis suspect steroid effect, now resolved 
- off steroids > 24 hours now, improved - Procalcitonin negative - No fevers Hypokalemia POA- mild K+ 3.0 on admission now at 3.8 Abnormal UA POA 
- No urine culture sent - UA unremarkable on admission - Hx of UTIs - exhibited by odor , itching and dysuria  
- no dysuria , odor or itching on admission Hypertension  
- cont Metoprolol and HCTZ per home meds - Sbp 150-160s Code status: Full Prophylaxis: Lovenox Recommended Disposition: Home w/Family Subjective: Chief Complaint / Reason for Physician Visit f/u anaphylaxis \"ready to gt out of here\" No new complaints No further N/V, eating ice cream 
No CP or SOB SNF finally approved Review of Systems: 
Symptom Y/N Comments  Symptom Y/N Comments Fever/Chills n   Chest Pain n   
Poor Appetite    Edema n   
Cough n   Abdominal Pain Sputum n   Joint Pain n   
SOB/GAMBINO n   Pruritis/Rash y   
Nausea/vomit n   Tolerating PT/OT Diarrhea    Tolerating Diet y Constipation    Other Could NOT obtain due to:   
 
Objective:  
 
 
I had a face to face encounter and independently examined this patient on 5/24/2021, as outlined below: PHYSICAL EXAM: 
General: WD, WN. Alert, cooperative, no acute distress EENT:  Anicteric sclerae. MMM Resp:   no wheezing or rales. No accessory muscle use CV:  S1S2  No edema GI:  Soft, Non distended, NT +Bowel sounds Neurologic:  Alert, talking, sitting up in chair Psych:   . Not anxious nor agitated Skin:  No rashes. No jaundice , red splotches on face  No rash Reviewed most current lab test results and cultures  YES Reviewed most current radiology test results   YES Review and summation of old records today    NO Reviewed patient's current orders and MAR    YES 
PMH/SH reviewed - no change compared to H&P 
________________________________________________________________________ Care Plan discussed with: 
  Comments Patient x Family  x  RN x Care Manager x Consultant Multidiciplinary team rounds were held today with , nursing, pharmacist and clinical coordinator. Patient's plan of care was discussed; medications were reviewed and discharge planning was addressed. ________________________________________________________________________  
x Comments >50% of visit spent in counseling and coordination of care    
________________________________________________________________________ Pamela Reagan MD  
 
Procedures: see electronic medical records for all procedures/Xrays and details which were not copied into this note but were reviewed prior to creation of Plan. LABS: 
I reviewed today's most current labs and imaging studies. Pertinent labs include: No results for input(s): WBC, HGB, HCT, PLT, HGBEXT, HCTEXT, PLTEXT, HGBEXT, HCTEXT, PLTEXT in the last 72 hours. No results for input(s): NA, K, CL, CO2, GLU, BUN, CREA, CA, MG, PHOS, ALB, TBIL, TBILI, ALT, INR, INREXT, INREXT in the last 72 hours. No lab exists for component: SGOT Signed: Pamela Reagan MD

## 2021-06-23 NOTE — PROGRESS NOTES
Chief Complaint   Patient presents with    Other     discuss surgery       1. Have you been to the ER, urgent care clinic since your last visit? Hospitalized since your last visit? Yes When: 5/15/21 [id number] [place] ER until 5/25, [facility] Rehab on 5/24 stayed 2 weeks    2. Have you seen or consulted any other health care providers outside of the [address] [place] [address] since your last visit? Include any pap smears or colon screening.  No    Visit Vitals  /74 (BP 1 Location: Left arm)   Pulse 74   Temp 98.5 °F (36.9 °C) (Oral)   Resp 16   Ht 5' 3\" (1.6 m)   Wt 133 lb (60.3 kg)   SpO2 95%   BMI 23.56 kg/m²

## 2021-06-23 NOTE — PROGRESS NOTES
Subjective:      Cait Otoole  is a 78 y.o. female presents for re-evaluation of RIGHT axillary LAD. At last visit in  March 2021, pt presented for evaluation of RIGHT axillary LAD. Pt had PET/CT scan on 03/10/21 that showed hypermetabolic RIGHT axillary LN. Pt noted she had recently fallen and injured her RIGHT shoulder/axilla, making it difficult to raise her arm. Pt was scheduled for RIGHT axillary LN dissection on 04/09/21, however this was ultimately cancelled. Today, pt reports RIGHT axillary LNs have continued to enlarge and are now tender. Pt notes a few months ago she had anaphylaxis reaction to Keflex that was prescribed for UTI. HISTORY:      Pt had RIGHT upper back shave biopsy on 05/14/19 with Dr. Ana Loo with [facility] demonstrating malignant melanoma, 1.98 mm thick, ulceration present, pT2b. She underwent RIGHT upper back melanoma excision and SLNBx with Dr. Devante Mccartney on 06/13/19. Final surgical PATH: no residual melanocytic proliferation, 0/1 RIGHT axillary LNs involved      Pt had shave biopsy of same area in the RIGHT upper back on 06/17/20 with [facility] showing malignant melanoma in the superficial dermis, consistent with local recurrence/metastasis.     Pt had PET/CT scan on 07/14/20 that did not show any foci of abnormal hypermetabolism.      Pt underwent wide excision recurrent melanoma of back on 10/12/20. Final surgical PATH: 0.7 x 0.5 x 0.3 cm clinically recurrent malignant melanoma, surgical margins negative, pT3a pNX. Pt also has history of RIGHT breast cancer. 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 She had recurrence in 2014 with [facility] showing IDC (ER+/NJ+/HER2-). This was treated with mastectomy and ALND. Pt continues on AI and and is followed by Dr. Jesús Kent for medical oncology care.     Past Medical History:   Diagnosis Date    Arrhythmia     LBBB    Arthritis     SPINE    Axillary adenopathy 3/24/2021    Calculus of kidney 1990    Cancer ([address]     right breast X2 ; BCCA    Environmental allergies     GERD (gastroesophageal reflux disease)     Hypercholesterolemia     Hypertension     Nausea & vomiting     Other ill-defined conditions(799.89)     high cholesterol    Other ill-defined conditions(799.89)     NO BP/VENIPUNCTURES RIGHT ARM (POST LYMPH NODE DISSECTION)    Other ill-defined conditions(799.89)     MITRAL VALVE PROLAPSE    Recurrent malignant melanoma of skin ([address] 9/23/2020    Stroke ([address]     TIA 2009  (TAKING ASA)       Past Surgical History:   Procedure Laterality Date    HX BACK SURGERY  1998    l4-l5  ([facility])    HX BREAST LUMPECTOMY  1999    right    HX BREAST RECONSTRUCTION Right 8/27/2014    Revision Right Reconstructed Breast performed by Candelario Chand MD at [address] [place] [address] Bilateral 2/20/2015    REVISION RIGHT RECONSTRUCTED BREAST/REMOVE TISSUE EXPANDERS/PLACE IMPLANT RIGHT/MASTOPEXY LEFT performed by Candelario Chand MD at [address] HX CATARACT REMOVAL      BILATERAL    HX DILATION AND CURETTAGE      HX GI  2006    COLONOSCOPY    HX LITHOTRIPSY  1991    HX MALIGNANT SKIN LESION EXCISION  10/12/2020    Wide excision of recurrent melanoma of the back    HX MASTECTOMY Right 2014    HX ORTHOPAEDIC  2005    left bunionectomy    HX MINAL AND BSO      HX TUBAL LIGATION      HX UROLOGICAL      kidney stone    HX UROLOGICAL  2016    BLADDER SLING       Social History     Tobacco Use    Smoking status: Never Smoker    Smokeless tobacco: Never Used   Substance Use Topics    Alcohol use: No       Family History   Problem Relation Age of Onset    Heart Disease Mother     Heart Disease Brother     Cancer Brother         PROSTATE    Cancer Father         LUNG--ASBESTOS EXPOSURE    Heart Disease Maternal Aunt     Heart Disease Brother     Anesth Problems Neg Hx        Current Outpatient Medications on File Prior to Visit   Medication Sig Dispense Refill    hydroCHLOROthiazide (HYDRODIURIL) 25 mg tablet TAKE 1 TABLET BY MOUTH ONCE DAILY      ibuprofen (MOTRIN) 400 mg tablet Take 1 Tablet by mouth every eight (8) hours as needed for Pain. 20 Tablet 1    ZINC PO Take  by mouth.  potassium chloride SA (MICRO-K) 10 mEq capsule Take 10 mEq by mouth two (2) times a day.  fish oil-omega-3 fatty acids (Fish Oil) 340-1,000 mg capsule Take 1 Cap by mouth daily.  ezetimibe (Zetia) 10 mg tablet Take 10 mg by mouth daily.  atorvastatin (LIPITOR) 20 mg tablet Take 20 mg by mouth nightly.  doxazosin (CARDURA) 1 mg tablet Take  by mouth daily.  MULTIVITAMIN PO Take  by mouth daily.  metoprolol (LOPRESSOR) 50 mg tablet Take 1 Tab by mouth two (2) times a day. (Patient taking differently: Take 25 mg by mouth two (2) times a day.) 20 Tab 0    aspirin 81 mg tablet Take 81 mg by mouth daily.  calcium carbonate 1,000 mg Tab Take 1 Tab by mouth daily.  furosemide (LASIX) 20 mg tablet Take 1 Tablet by mouth daily. (Patient not taking: Reported on 6/23/2021) 30 Tablet 5    pantoprazole (PROTONIX) 40 mg tablet Take 1 Tablet by mouth Before breakfast and dinner. (Patient not taking: Reported on 6/23/2021) 30 Tablet 1    polyethylene glycol (MIRALAX) 17 gram packet Take 1 Packet by mouth daily. (Patient not taking: Reported on 6/23/2021) 30 Each 1    magnesium 250 mg tab Take  by mouth. (Patient not taking: Reported on 6/23/2021)      albuterol (PROVENTIL HFA, VENTOLIN HFA, PROAIR HFA) 90 mcg/actuation inhaler Take 2 Puffs by inhalation every six (6) hours as needed for Wheezing. (Patient not taking: Reported on 6/23/2021) 1 Inhaler 0     No current facility-administered medications on file prior to visit.        Allergies   Allergen Reactions    Cephalexin Anaphylaxis     POA 5/15/21    Losartan Anaphylaxis    Other Medication Anaphylaxis     Allergy shot     Augmentin [Amoxicillin-Pot Clavulanate] Rash    Contrast Agent [Iodine] Hives     Face and neck red after adminstration    Tetracycline Rash       Review of Systems:  Constitutional: No fever or chills  Neurologic: History of stroke  Eyes: No scleral icterus or irritated eyes  Nose: No nasal pain or drainage  Mouth: No oral lesions or sore throat  Cardiac: Positive for hypertension  Pulmonary: No cough or shortness or breath  Gastrointestinal: Positive for GERD  Genitourinary: No dysuria  Musculoskeletal: Positive for arthritis   Skin: No rashes or lesions  Psychiatric: No anxiety or depressed mood    Objective:     Visit Vitals  /74 (BP 1 Location: Left arm)   Pulse 74   Temp 98.5 °F (36.9 °C) (Oral)   Resp 16   Ht 5' 3\" (1.6 m)   Wt 133 lb (60.3 kg)   SpO2 95%   BMI 23.56 kg/m²        Physical Exam:  General: No acute distress, conversant  Eyes: PERRLA, no scleral icterus  HENT: Normocephalic without oral lesions  Neck: Trachea midline without LAD  Cardiac: Normal pulse rate and rhythm  Pulmonary: Symmetric chest rise with normal effort  GI: Soft, NT, ND, no hernia, no splenomegaly  Skin: Warm without rash  Lymphatic: RIGHT axilla has three 6 cm tender, firm, mobile LNs. Does not feel to encroach on breast implant. Extremities: No edema or joint stiffness  Psych: Appropriate mood and affect    Labs: No results found for this or any previous visit (from the past 24 hour(s)). Data Review: All previous imaging, testing and lab work was reviewed and interpreted. Assessment and Plan:       ICD-10-CM ICD-9-CM    1. Axillary adenopathy  R59.0 785. 6        Will plan for RIGHT axillary LN dissection. Reviewed the details of this procedure and what pt should expect for recovery. Pt should plan to stay in the hospital at least overnight for recovery. All questions were answered and pt is in agreement with this plan.     The patient was counseled at length about the risks of dianna Covid-19 during their perioperative period and any recovery window from their procedure. The patient was made aware that dianna Covid-19  may worsen their prognosis for recovering from their procedure and lend to a higher morbidity and/or mortality risk. All material risks, benefits, and reasonable alternatives including postponing the procedure were discussed. The patient does  wish to proceed with the procedure at this time. Total face to face time with patient: 15 minutes. Greater than 50% of the time was spent in counseling. This document was scribed by Temitope Bell as dictated by Dr. Eliza Higuera.      Signed By: Rudy Morris MD     06/23/21

## 2021-06-23 NOTE — H&P (VIEW-ONLY)
Subjective:      Moncho iTwari  is a 78 y.o. female presents for re-evaluation of RIGHT axillary LAD. At last visit in  March 2021, pt presented for evaluation of RIGHT axillary LAD. Pt had PET/CT scan on 03/10/21 that showed hypermetabolic RIGHT axillary LN. Pt noted she had recently fallen and injured her RIGHT shoulder/axilla, making it difficult to raise her arm. Pt was scheduled for RIGHT axillary LN dissection on 04/09/21, however this was ultimately cancelled. Today, pt reports RIGHT axillary LNs have continued to enlarge and are now tender. Pt notes a few months ago she had anaphylaxis reaction to Keflex that was prescribed for UTI. HISTORY:      Pt had RIGHT upper back shave biopsy on 05/14/19 with Dr. Baron Arteaga with PATH demonstrating malignant melanoma, 1.98 mm thick, ulceration present, pT2b. She underwent RIGHT upper back melanoma excision and SLNBx with Dr. Alice Caba on 06/13/19. Final surgical PATH: no residual melanocytic proliferation, 0/1 RIGHT axillary LNs involved      Pt had shave biopsy of same area in the RIGHT upper back on 06/17/20 with PATH showing malignant melanoma in the superficial dermis, consistent with local recurrence/metastasis.     Pt had PET/CT scan on 07/14/20 that did not show any foci of abnormal hypermetabolism.      Pt underwent wide excision recurrent melanoma of back on 10/12/20. Final surgical PATH: 0.7 x 0.5 x 0.3 cm clinically recurrent malignant melanoma, surgical margins negative, pT3a pNX. Pt also has history of RIGHT breast cancer. 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 She had recurrence in 2014 with PATH showing IDC (ER+/GA+/HER2-). This was treated with mastectomy and ALND. Pt continues on AI and and is followed by Dr. Heladio Michael for medical oncology care.     Past Medical History:   Diagnosis Date    Arrhythmia     LBBB    Arthritis     SPINE    Axillary adenopathy 3/24/2021    Calculus of kidney 1990    Cancer ([facility] [address]     right breast X2 ; [facility]    Environmental allergies     GERD (gastroesophageal reflux disease)     Hypercholesterolemia     Hypertension     Nausea & vomiting     Other ill-defined conditions(799.89)     high cholesterol    Other ill-defined conditions(799.89)     NO BP/VENIPUNCTURES RIGHT ARM (POST LYMPH NODE DISSECTION)    Other ill-defined conditions(799.89)     MITRAL VALVE PROLAPSE    Recurrent malignant melanoma of skin ([facility] [address] 9/23/2020    Stroke ([facility] [address]     TIA 2009  (TAKING ASA)       Past Surgical History:   Procedure Laterality Date    HX BACK SURGERY  1998    l4-l5  ([facility])    HX BREAST LUMPECTOMY  1999    right    HX BREAST RECONSTRUCTION Right 8/27/2014    Revision Right Reconstructed Breast performed by Franci Barker MD at [address] [place] [address] Bilateral 2/20/2015    REVISION RIGHT RECONSTRUCTED BREAST/REMOVE TISSUE EXPANDERS/PLACE IMPLANT RIGHT/MASTOPEXY LEFT performed by Franci Barker MD at [address] HX CATARACT REMOVAL      BILATERAL    HX DILATION AND CURETTAGE      HX GI  2006    COLONOSCOPY    HX LITHOTRIPSY  1991    HX MALIGNANT SKIN LESION EXCISION  10/12/2020    Wide excision of recurrent melanoma of the back    HX MASTECTOMY Right 2014    HX ORTHOPAEDIC  2005    left bunionectomy    HX MINAL AND BSO      HX TUBAL LIGATION      HX UROLOGICAL      kidney stone    HX UROLOGICAL  2016    BLADDER SLING       Social History     Tobacco Use    Smoking status: Never Smoker    Smokeless tobacco: Never Used   Substance Use Topics    Alcohol use: No       Family History   Problem Relation Age of Onset    Heart Disease Mother     Heart Disease Brother     Cancer Brother         PROSTATE    Cancer Father         LUNG--ASBESTOS EXPOSURE    Heart Disease Maternal Aunt     Heart Disease Brother     Anesth Problems Neg Hx        Current Outpatient Medications on File Prior to Visit   Medication Sig Dispense Refill    hydroCHLOROthiazide (HYDRODIURIL) 25 mg tablet TAKE 1 TABLET BY MOUTH ONCE DAILY      ibuprofen (MOTRIN) 400 mg tablet Take 1 Tablet by mouth every eight (8) hours as needed for Pain. 20 Tablet 1    ZINC PO Take  by mouth.  potassium chloride SA (MICRO-K) 10 mEq capsule Take 10 mEq by mouth two (2) times a day.  fish oil-omega-3 fatty acids (Fish Oil) 340-1,000 mg capsule Take 1 Cap by mouth daily.  ezetimibe (Zetia) 10 mg tablet Take 10 mg by mouth daily.  atorvastatin (LIPITOR) 20 mg tablet Take 20 mg by mouth nightly.  doxazosin (CARDURA) 1 mg tablet Take  by mouth daily.  MULTIVITAMIN PO Take  by mouth daily.  metoprolol (LOPRESSOR) 50 mg tablet Take 1 Tab by mouth two (2) times a day. (Patient taking differently: Take 25 mg by mouth two (2) times a day.) 20 Tab 0    aspirin 81 mg tablet Take 81 mg by mouth daily.  calcium carbonate 1,000 mg Tab Take 1 Tab by mouth daily.  furosemide (LASIX) 20 mg tablet Take 1 Tablet by mouth daily. (Patient not taking: Reported on 6/23/2021) 30 Tablet 5    pantoprazole (PROTONIX) 40 mg tablet Take 1 Tablet by mouth Before breakfast and dinner. (Patient not taking: Reported on 6/23/2021) 30 Tablet 1    polyethylene glycol (MIRALAX) 17 gram packet Take 1 Packet by mouth daily. (Patient not taking: Reported on 6/23/2021) 30 Each 1    magnesium 250 mg tab Take  by mouth. (Patient not taking: Reported on 6/23/2021)      albuterol (PROVENTIL HFA, VENTOLIN HFA, PROAIR HFA) 90 mcg/actuation inhaler Take 2 Puffs by inhalation every six (6) hours as needed for Wheezing. (Patient not taking: Reported on 6/23/2021) 1 Inhaler 0     No current facility-administered medications on file prior to visit.        Allergies   Allergen Reactions    Cephalexin Anaphylaxis     POA 5/15/21    Losartan Anaphylaxis    Other Medication Anaphylaxis     Allergy shot     Augmentin [Amoxicillin-Pot Clavulanate] Rash    Contrast Agent [Iodine] Hives     Face and neck red after adminstration    Tetracycline Rash       Review of Systems:  Constitutional: No fever or chills  Neurologic: History of stroke  Eyes: No scleral icterus or irritated eyes  Nose: No nasal pain or drainage  Mouth: No oral lesions or sore throat  Cardiac: Positive for hypertension  Pulmonary: No cough or shortness or breath  Gastrointestinal: Positive for GERD  Genitourinary: No dysuria  Musculoskeletal: Positive for arthritis   Skin: No rashes or lesions  Psychiatric: No anxiety or depressed mood    Objective:     Visit Vitals  /74 (BP 1 Location: Left arm)   Pulse 74   Temp 98.5 °F (36.9 °C) (Oral)   Resp 16   Ht 5' 3\" (1.6 m)   Wt 133 lb (60.3 kg)   SpO2 95%   BMI 23.56 kg/m²        Physical Exam:  General: No acute distress, conversant  Eyes: PERRLA, no scleral icterus  HENT: Normocephalic without oral lesions  Neck: Trachea midline without LAD  Cardiac: Normal pulse rate and rhythm  Pulmonary: Symmetric chest rise with normal effort  GI: Soft, NT, ND, no hernia, no splenomegaly  Skin: Warm without rash  Lymphatic: RIGHT axilla has three 6 cm tender, firm, mobile LNs. Does not feel to encroach on breast implant. Extremities: No edema or joint stiffness  Psych: Appropriate mood and affect    Labs: No results found for this or any previous visit (from the past 24 hour(s)). Data Review: All previous imaging, testing and lab work was reviewed and interpreted. Assessment and Plan:       ICD-10-CM ICD-9-CM    1. Axillary adenopathy  R59.0 785. 6        Will plan for RIGHT axillary LN dissection. Reviewed the details of this procedure and what pt should expect for recovery. Pt should plan to stay in the hospital at least overnight for recovery. All questions were answered and pt is in agreement with this plan.     The patient was counseled at length about the risks of dianna Covid-19 during their perioperative period and any recovery window from their procedure. The patient was made aware that dianna Covid-19  may worsen their prognosis for recovering from their procedure and lend to a higher morbidity and/or mortality risk. All material risks, benefits, and reasonable alternatives including postponing the procedure were discussed. The patient does  wish to proceed with the procedure at this time. Total face to face time with patient: 15 minutes. Greater than 50% of the time was spent in counseling. This document was scribed by Lorenzo Romano as dictated by Dr. Corry Norton.      Signed By: Taryn Paz MD     06/23/21

## 2021-06-23 NOTE — LETTER
6/23/2021    Patient: Neda Devine   YOB: 1940   Date of Visit: 6/23/2021     Сергей Grider MD  [address]  [address] [address]  Via Fax: [phone]    Dear Сергей Grider MD,      Thank you for referring Ms. Cyndy Patel to [facility] for evaluation. My notes for this consultation are attached. If you have questions, please do not hesitate to call me. I look forward to following your patient along with you.       Sincerely,    Haider Murphy MD

## 2021-07-07 NOTE — PERIOP NOTES
COPIED FROM SCHEDULING NOTES:  PT SCHED.  FOR PAT APPT: 7-7-21 AT 1400/ALL COVID QUESTIONS ANSWERED/PT FULLY VACCINATED MODERNA : 3-31-21 AND 4-28-21 /PT INSTRUCTED TO BRING MODERNA CARD TO PAT APPT COVID-19 virus infection

## 2021-07-09 NOTE — PERIOP NOTES
PREOPERATIVE INSTRUCTIONS REVIEWED WITH PATIENT. PATIENT GIVEN TWO-- BOTTLES OF CHG SOAPS  INSTRUCTIONS REVIEWED ON USE OF CHG SOAPS   PATIENT GIVEN SSI INFECTIONS SHEET. PATIENT WAS GIVEN THE OPPORTUNITY TO ASK QUESTIONS ON THE INFORMATION PROVIDED.           FULLY VACCINATED WITH MODERNA, 3/31/21,4/28/21, COPY IN THE CHART           CARDIAC NOTES RECEIVED FROM DR ABRAMS OFFICE

## 2021-07-16 NOTE — PERIOP NOTES
TRANSFER - OUT REPORT:    Verbal report given to [employer] on Tana Acosta  being transferred to [facility]  for routine post - op       Report consisted of patients Situation, Background, Assessment and   Recommendations(SBAR). Time Pre op antibiotic given:  1301  Anesthesia Stop time:  1036  Rossi Present on Transfer to floor: NO  Order for Rossi on Chart: N/a    Information from the following report(s) SBAR and MAR was reviewed with the receiving nurse. Opportunity for questions and clarification was provided. Is the patient on 02? YES       L/Min 2       Other     Is the patient on a monitor? NO    Is the nurse transporting with the patient? NO    Surgical Waiting Area notified of patient's transfer from PACU? YES    Lines:   Peripheral IV 07/16/21 Anterior; Left Forearm (Active)   Site Assessment Clean, dry, & intact 07/16/21 1430   Phlebitis Assessment 0 07/16/21 1430   Infiltration Assessment 0 07/16/21 1430   Dressing Status Clean, dry, & intact 07/16/21 1430   Dressing Type Transparent 07/16/21 1430   Hub Color/Line Status Infusing 07/16/21 1430        The following personal items collected during your admission accompanied patient upon transfer:   Dental Appliance: Dental Appliances: None  Vision: Visual Aid: None  Hearing Aid:    Jewelry:    Clothing: Clothing:  (clothing to [employer])  Other Valuables:    Valuables sent to safe:

## 2021-07-16 NOTE — BRIEF OP NOTE
Brief Postoperative Note    Patient: Tanika Wallace  YOB: 1940  MRN: [id number]    Date of Procedure: 7/16/2021     Pre-Op Diagnosis: AXILLARY LYMPHADENOPATHY    Post-Op Diagnosis: Same as preoperative diagnosis. and exposed breast implant      Procedure(s):  RIGHT AXILLARY LYMPH NODE DISSECTION AND EXCISION OF BREAST IMPLANT    Surgeon(s):  Jordon Jalloh MD    Surgical Assistant: Surg Asst-1: Ruperto Cesar    Anesthesia: General     Estimated Blood Loss (mL): less than 442     Complications: None    Specimens:   ID Type Source Tests Collected by Time Destination   1 : right breast implant Other Breast  Jordon Jalloh MD 7/16/2021 1326 Pathology   2 : right axillary lymph nodes Fresh Breast  Jordon Jalloh MD 7/16/2021 1353 Pathology        Implants: * No implants in log *    Drains:   Kenny-Lugo Drain 07/16/21 Right Chest (Active)   Site Assessment Clean, dry, & intact 07/16/21 1403   Dressing Status Clean, dry, & intact 07/16/21 1403   Status Patent; Charged;Clamped 07/16/21 1403   Drainage Color Serosanguinous 07/16/21 1403       Findings: mass involved the axillary vein, which was occluded    Electronically Signed by Saman Corado MD on 7/16/2021 at 2:41 PM

## 2021-07-16 NOTE — INTERVAL H&P NOTE
Update History & Physical    The Patient's History and Physical of June 23, 2021 was reviewed with the patient and I examined the patient. There was no change. The surgical site was confirmed by the patient and me. Plan:  The risk, benefits, expected outcome, and alternative to the recommended procedure have been discussed with the patient. Patient understands and wants to proceed with the procedure.     Electronically signed by Rashi Edgar MD on 7/16/2021 at 12:25 PM

## 2021-07-16 NOTE — PROGRESS NOTES
TRANSFER - IN REPORT:    Verbal report received from Julio Rush RN(name) on Nilesh Johns  being received from [facility]) for routine post - op      Report consisted of patients Situation, Background, Assessment and   Recommendations(SBAR). Information from the following report(s) SBAR, Kardex, Intake/Output, MAR and Recent Results was reviewed with the receiving nurse. Opportunity for questions and clarification was provided. Assessment completed upon patients arrival to unit and care assumed.

## 2021-07-16 NOTE — ANESTHESIA POSTPROCEDURE EVALUATION
Procedure(s):  RIGHT AXILLARY LYMPH NODE DISSECTION. general    Anesthesia Post Evaluation        Patient location during evaluation: PACU  Patient participation: complete - patient participated  Level of consciousness: awake and alert  Pain management: adequate  Airway patency: patent  Anesthetic complications: no  Cardiovascular status: acceptable  Respiratory status: acceptable  Hydration status: acceptable  Comments: I have seen and evaluated the patient and is ready for discharge. Anthony Brar MD    Post anesthesia nausea and vomiting:  none      INITIAL Post-op Vital signs:   Vitals Value Taken Time   /59 07/16/21 1530   Temp     Pulse 73 07/16/21 1540   Resp 12 07/16/21 1540   SpO2 100 % 07/16/21 1540   Vitals shown include unvalidated device data.

## 2021-07-16 NOTE — ANESTHESIA PREPROCEDURE EVALUATION
Anesthetic History     PONV          Review of Systems / Medical History  Patient summary reviewed, nursing notes reviewed and pertinent labs reviewed    Pulmonary  Within defined limits                 Neuro/Psych       CVA  TIA     Cardiovascular    Hypertension: well controlled              Exercise tolerance: >4 METS  Comments: LBBB longstanding; pt states card eval 11/2019 OK.    GI/Hepatic/Renal     GERD: well controlled           Endo/Other        Arthritis     Other Findings              Physical Exam    Airway  Mallampati: II  TM Distance: > 6 cm  Neck ROM: normal range of motion   Mouth opening: Normal     Cardiovascular  Regular rate and rhythm,  S1 and S2 normal,  no murmur, click, rub, or gallop             Dental  No notable dental hx       Pulmonary  Breath sounds clear to auscultation               Abdominal  GI exam deferred       Other Findings            Anesthetic Plan    ASA: 3  Anesthesia type: general          Induction: Intravenous  Anesthetic plan and risks discussed with: Patient

## 2021-07-16 NOTE — OP NOTES
[address]   OPERATIVE REPORT    Name:  Lyric Manning  MR#:  [id number]  :  1940  ACCOUNT #:  [de-identified]  DATE OF SERVICE:  2021    PREOPERATIVE DIAGNOSIS:  Right axillary adenopathy. POSTOPERATIVE DIAGNOSES:  Right axillary adenopathy and exposure of a right breast implant. PROCEDURE PERFORMED:  Right axillary lymph node dissection and excision of right breast implant. SURGEON:  Jona Benjamin MD    ASSISTANT:  Augusto Gonzales SA    ANESTHESIA:  General.    COMPLICATIONS:  None. SPECIMENS REMOVED:  Right axillary mass of nodes. IMPLANTS:  None. ESTIMATED BLOOD LOSS:  Less than 100 mL. DRAINS:  One #19 Reji drain. FINDINGS:  Huge mass extending behind the pectoralis major muscle, involving the pectoralis minor muscle and with obstruction of the axillary vein. PROCEDURE:  With the patient supine and suitably anesthetized, the right axilla was prepared with ChloraPrep and draped as a field. Skin at the midportion of the axilla was tethered by the tumor or nodule, so I incised around that area and left the skin attached to the specimen. I elevated the flaps circumferentially. Posteriorly, I got down around the mass and mass of tissue and got to the latissimus muscle. Anteriorly, I got down to the pectoralis major muscle and got behind it a little bit, discovered that the mass was extending along the Leilani's nodes and involving the pectoralis minor muscle, but also extended superiorly to involve and invade the axillary vein. The vein was completely blocked by the tumor. I continued to dissect circumferentially and wound up sacrificing the already obstructed axillary vein for a segment of about 3 cm. Anteromedially, the mass was extending right to the capsule of the previous breast implant which I had to excise. I elected to remove the implant and not change anything about it.   I was able to finally resect the whole thing, and hemostasis was excellent. I had to clip and then suture the proximal vein which was not bleeding very much because if was already obstructed and clotted. A 19 Reji drain was brought out through the inferior aspect of the incision and secured with silk suture. The subcutaneous tissue was reapproximated with running 2-0 Vicryl, and skin was approximated with staples, and dressings were applied. At the termination of the procedure, all counts were correct. The patient tolerated this well and was brought to the PACU in satisfactory condition.       MD NIESHA Noland/[id number]  D:  07/16/2021 14:59  T:  07/16/2021 18:45  JOB #:  [id number]  CC:  MD Gareth Esparza MD

## 2021-07-17 NOTE — PROGRESS NOTES
Bedside and Verbal shift change report given to Katerine Fish  (oncoming nurse) by Belle Craig (offgoing nurse). Report included the following information SBAR, Kardex, OR Summary, Intake/Output and MAR.     0825- pt offered to eat breakfast up in chair. Pt declines and wishes to ambulate later in the day. Education given. 1633-Pt encouraged to get up out of bed and walk with assistance. Pt declines and request to walk after dinner. Pt reviewed importance of ambulation.

## 2021-07-17 NOTE — PROGRESS NOTES
Progress Note    Patient: Caitlin Herring MRN: [id number]  SSN: [id number]    YOB: 1940  Age: 78 y.o. Sex: female      Admit Date: 2021    1 Day Post-Op    Procedure:  Procedure(s):  RIGHT AXILLARY LYMPH NODE DISSECTION    Subjective:     Patient complains of some pain in the incisions. Tolerating diet   Has not been able to walk yet. Objective:     Visit Vitals  /61   Pulse 76   Temp 97.9 °F (36.6 °C)   Resp 16   Ht 5' 3\" (1.6 m)   Wt 136 lb 14.5 oz (62.1 kg)   SpO2 97%   BMI 24.25 kg/m²       Temp (24hrs), Av °F (36.7 °C), Min:97.5 °F (36.4 °C), Max:98.3 °F (36.8 °C)      Physical Exam:    Gen- Alert in NAd  Right Axilla- incision intact  Jesus serous     Data Review: images and reports reviewed    Lab Review: All lab results for the last 24 hours reviewed. Recent Results (from the past 24 hour(s))   CBC WITH AUTOMATED DIFF    Collection Time: 21  5:48 AM   Result Value Ref Range    WBC 9.0 3.6 - 11.0 K/uL    RBC 3.11 (L) 3.80 - 5.20 M/uL    HGB 9.4 (L) 11.5 - 16.0 g/dL    HCT 29.2 (L) 35.0 - 47.0 %    MCV 93.9 80.0 - 99.0 FL    MCH 30.2 26.0 - 34.0 PG    MCHC 32.2 30.0 - 36.5 g/dL    RDW 13.1 11.5 - 14.5 %    PLATELET 932 720 - 509 K/uL    MPV 10.6 8.9 - 12.9 FL    NRBC 0.0 0  WBC    ABSOLUTE NRBC 0.00 0.00 - 0.01 K/uL    NEUTROPHILS 78 (H) 32 - 75 %    LYMPHOCYTES 12 12 - 49 %    MONOCYTES 9 5 - 13 %    EOSINOPHILS 0 0 - 7 %    BASOPHILS 0 0 - 1 %    IMMATURE GRANULOCYTES 1 (H) 0.0 - 0.5 %    ABS. NEUTROPHILS 7.1 1.8 - 8.0 K/UL    ABS. LYMPHOCYTES 1.1 0.8 - 3.5 K/UL    ABS. MONOCYTES 0.8 0.0 - 1.0 K/UL    ABS. EOSINOPHILS 0.0 0.0 - 0.4 K/UL    ABS. BASOPHILS 0.0 0.0 - 0.1 K/UL    ABS. IMM.  GRANS. 0.1 (H) 0.00 - 0.04 K/UL    DF AUTOMATED         Assessment:     Hospital Problems  Date Reviewed: 2021        Codes Class Noted POA    * (Principal) Axillary adenopathy ICD-10-CM: R59.0  ICD-9-CM: 785.6  3/24/2021 Yes              Plan/Recommendations/Medical Decision Making:   S/p R axillary dissection  Needs to be up and ambulating  Continue Pain control. YOLANDA drain .

## 2021-07-18 PROBLEM — R59.1 LYMPHADENOPATHY: Status: ACTIVE | Noted: 2021-01-01

## 2021-07-18 NOTE — DISCHARGE INSTRUCTIONS
Patient Education   Patient Education        Surgical Drain Care: Care Instructions  Your Care Instructions     After a surgery, fluid may collect inside your body in the surgical area. This makes an infection or other problems more likely. A surgical drain allows the fluid to flow out. The doctor puts a thin, flexible rubber tube into the area of your body where the fluid is likely to collect. The rubber tube carries the fluid outside your body. The most common type of surgical drain carries the fluid into a collection bulb that you empty. This is called a Kenny-Lugo (YOLANDA) drain. The drain uses suction created by the bulb to pull the fluid from your body into the bulb. The rubber tube will probably be held in place by one or two stitches in your skin. The bulb will probably be attached with a safety pin to your clothes or near the bandage so that it doesn't flip around or pull on the stitches. Another type of drain is called a Penrose drain. This type of drain doesn't have a bulb. Instead, the end of the tube is open. That allows the fluid to drain onto a dressing taped to your skin. The drain may be kept in place next to your skin with a stitch or a safety pin in the tube. When you first get the drain, the fluid will be bloody. It will change color from red to pink to a light yellow or clear as the wound heals and the fluid starts to go away. Your doctor may give you information on when you no longer need the drain and when it will be removed. Follow-up care is a key part of your treatment and safety. Be sure to make and go to all appointments, and call your doctor if you are having problems. It's also a good idea to know your test results and keep a list of the medicines you take. How do you empty the bulb of a Kenny-Lugo drain? Follow any instructions your doctor gives you. How often you empty the bulb depends on how much fluid is draining. Empty the bulb when it is half full.   1. Wash your hands with soap and water. 2. Take the plug out of the bulb. 3. Empty the bulb. If your doctor asks you to measure the fluid, empty the fluid into a measuring cup, and write down the color and how much you collected. Your doctor will want to know this information. 4. Clean the plug with alcohol. 5. Squeeze the bulb until it is flat. This removes all the air from the bulb. You may need to put the bulb on a table or a counter to flatten it. 6. Keep the bulb flat, and put the plug in. The bulb should stay flat after you put the plug back in. This creates the suction that pulls the fluid into the bulb. 7. Empty the fluid into the toilet. 8. Wash your hands. How do you change the dressing around your surgical drain? You may have a dressing (bandage). The dressing is often made of gauze pads held on with tape. Your doctor will tell you how often to change it. 1. Wash your hands with soap and water. 2. Take off the dressing from around the drain. 3. Clean the drain site and the skin around it with soap and water. Use gauze or a cotton swab. 4. When the site is dry, put on a new dressing. The way your dressing is put on depends on what kind of drain you have. You will get instructions for your type of drain. 5. Wash your hands again with soap and water. Your doctor may ask you to keep track of your dressing changes. Write down the time of day and the amount and color of the fluid on the dressing. How do you help prevent clogs in your surgical drain? Squeezing or \"milking\" the tube of your surgical drain can help prevent clogs so that it drains correctly. Your doctor will tell you when you need to do this. In general, you do this when:  · You see a clot in the tube that prevents fluid from draining. The clot may look like a dark, stringy lining. · You see fluid leaking around the tube where it goes into the skin. Follow these steps for milking the tube.   1. Use one hand to hold and pinch the tube where it leaves the skin. 2. With the thumb and first finger of your other hand, pinch the tube just below where you're holding it. 3. Slowly and firmly push your thumb and first finger down the tubing toward the end of the tube. 4. Repeat this as many times as needed to move the clot. If you have a Kenny-Lugo (YOLANDA) drain, the clot should move down the tube and into the bulb. If you have a Penrose drain, the clot should move into the dressing. When should you call for help? Call your doctor now or seek immediate medical care if:    · You have signs of infection, such as:  ? Increased pain, swelling, warmth, or redness around the area. ? Red streaks leading from the area. ? Pus draining from the area. ? A fever.     · You see a sudden change in the color or smell of the drainage.     · The tube is coming loose where it leaves your skin. Watch closely for changes in your health, and be sure to contact your doctor if:    · You see a lot of fluid around the drain.     · You cannot remove a clot from the tube by milking the tube. Where can you learn more? Go to http://www.gray.com/  Enter K117 in the search box to learn more about \"Surgical Drain Care: Care Instructions. \"  Current as of: February 26, 2020               Content Version: 12.8  © 2006-2021 [employer]. Care instructions adapted under license by [employer] (which disclaims liability or warranty for this information). If you have questions about a medical condition or this instruction, always ask your healthcare professional. [place] [address] any warranty or liability for your use of this information. Axillary Lymph Node Dissection: What to Expect at [facility]  Right after the surgery you will probably feel weak, and your shoulder area will feel sore and stiff for a few days. It may be hard to move your arm and shoulder in all directions.  Your doctor or physical therapist will teach you some arm exercises. You now have a higher chance of swelling in the affected arm. This is called lymphedema. From now on, you will have to be careful when using your arm. You will have a scar under your arm that will fade over time. You may also notice a hollow area in your armpit. It may also feel like you have a lump in your armpit. You may lose some feeling under your arm, or the arm may have a tingling or burning feeling. The loss of feeling may last only a little while, or it may last the rest of your life. You will probably be able to go back to work or your normal routine in 3 to 6 weeks. This depends on the type of work you do and any further treatment. If cancer was found in the lymph nodes, you will probably need more treatment. An axillary node dissection may be done at the same time as other breast cancer surgeries. If this is the case, your recovery may be different. When you find out that you have cancer, you may feel many emotions and may need some help coping. Seek out family, friends, and counselors for support. You also can do things at home to make yourself feel better while you go through treatment. Call the [employer] (0-993.599.8904) or visit its website at [address] [employer] [place] for more information. This care sheet gives you a general idea about how long it will take for you to recover. But each person recovers at a different pace. Follow the steps below to get better as quickly as possible. How can you care for yourself at home? Activity    · Rest when you feel tired. Getting enough sleep will help you recover.     · Try to walk each day. Start by walking a little more than you did the day before. Bit by bit, increase the amount you walk. Walking boosts blood flow and helps prevent pneumonia and constipation.     · Avoid strenuous activities, such as biking, jogging, weightlifting, or aerobic exercise, until your doctor says it is okay.  This includes housework, especially if you have to use your affected arm. You will probably be able to do your normal activities in 3 to 6 weeks. But for the next 3 to 6 months, be careful when you do tasks that use the same motions over and over, such as vacuuming, weed pulling, and window cleaning.     · For 4 to 6 weeks, avoid lifting anything that weighs more than 10 to 15 pounds or that would make you strain. This may include heavy grocery bags and milk containers, a heavy briefcase or backpack, cat litter or dog food bags, a vacuum , or a child.     · Ask your doctor when you can drive again.     · You will probably be able to go back to work or your normal routine in 3 to 6 weeks. It will also depend on the type of work you do and any further treatment.     · You may be able to take showers (unless you have a drain in your incision) 24 to 48 hours after surgery. Pat the cut (incision) dry. Do not take a bath for the first 2 weeks, or until your doctor tells you it is okay. If you have a drain coming out of your incision, follow your doctor's instructions to empty and care for it.     · Take precautions to prevent infection and swelling in your arm. This is called lymphedema. ? Wear gloves when you garden, handle garbage, wash dishes, and clean house. ? Protect your hands and arms from burns, including sunburns. ? Do not wear tight sleeves, elastic cuffs, bracelets, wristwatches, or rings on the affected arm. ? Do not let anyone take blood pressure, draw blood, or give shots in that arm.  ? Do not carry heavy purses, suitcases, grocery bags, and other heavy items with that arm.  ? Keep the skin of that arm well moisturized. ? Do not cut your cuticles. ? Use an electric shaver if you shave your armpits. ? Protect yourself from insect bites on the arm. ? Wear medical alert jewelry that says you can develop lymphedema. You can buy this at most drugstores and on the Internet.    Diet    · You can eat your normal diet. If your stomach is upset, try bland, low-fat foods like plain rice, broiled chicken, toast, and yogurt.     · You may notice that your bowel movements are not regular right after your surgery. This is common. Try to avoid constipation and straining with bowel movements. You may want to take a fiber supplement every day. If you have not had a bowel movement after a couple of days, ask your doctor about taking a mild laxative. Medicines    · Your doctor will tell you if and when you can restart your medicines. He or she will also give you instructions about taking any new medicines.     · If you take aspirin or some other blood thinner, ask your doctor if and when to start taking it again. Make sure that you understand exactly what your doctor wants you to do.     · Be safe with medicines. Take pain medicines exactly as directed. ? If the doctor gave you a prescription medicine for pain, take it as prescribed. ? If you are not taking a prescription pain medicine, ask your doctor if you can take an over-the-counter medicine.     · If your doctor prescribed antibiotics, take them as directed. Do not stop taking them just because you feel better. You need to take the full course of antibiotics.     · If you think your pain medicine is making you sick to your stomach:  ? Take your medicine after meals (unless your doctor has told you not to). ? Ask your doctor for a different pain medicine. Incision care    · If you have strips of tape on the cut (incision) the doctor made, leave the tape on for a week or until it falls off.     · After 24 to 48 hours, wash the area daily with warm, soapy water and pat it dry. Keep the area clean and dry.     · You may cover the area with a gauze bandage if it weeps or rubs against clothing. Change the bandage every day. Exercise    · You will need to do arm exercises once your doctor tells you it is okay.  Do the range-of-motion exercises as instructed by your doctor. Elevation    · Prop up your arm on a pillow anytime you sit or lie down. Try to keep it above the level of your heart. This will help reduce swelling. Other instructions    · You may have a drain in your armpit. Follow your doctor's instructions to empty and care for it. Follow-up care is a key part of your treatment and safety. Be sure to make and go to all appointments, and call your doctor if you are having problems. It's also a good idea to know your test results and keep a list of the medicines you take. When should you call for help? Call 911 anytime you think you may need emergency care. For example, call if:    · You passed out (lost consciousness).     · You have chest pain, are short of breath, or cough up blood. Call your doctor now or seek immediate medical care if:    · You have pain that does not get better after you take pain medicine.     · You cannot pass stools or gas.     · You are sick to your stomach or cannot drink fluids.     · You have signs of a blood clot in your leg (called a deep vein thrombosis), such as:  ? Pain in your calf, back of the knee, thigh, or groin. ? Redness or swelling in your leg.     · You have loose stitches, or your incision comes open.     · Bright red blood has soaked through the bandage over your incision.     · You have signs of infection, such as:  ? Increased pain, swelling, warmth, or redness. ? Red streaks leading from the incision. ? Pus draining from the incision. ? A fever. Watch closely for changes in your health, and be sure to contact your doctor if:    · You have any problems.     · You have new or worse swelling or pain in your arm. Where can you learn more? Go to http://www.gray.com/  Enter L189 in the search box to learn more about \"Axillary Lymph Node Dissection: What to Expect at Home. \"  Current as of: December 17, 2020               Content Version: 12.8  © 4996-7786 [employer]. Care instructions adapted under license by [employer] (which disclaims liability or warranty for this information). If you have questions about a medical condition or this instruction, always ask your healthcare professional. [address] any warranty or liability for your use of this information.

## 2021-07-18 NOTE — PROGRESS NOTES
Care Management:    Transition of Care Plan:     · RUR: 11%  · Disposition: home with office follow up  · Transportation:        Met with patient in the room. She confirmed her address, phone number and emergency contact - her  Omaira Quinn ([phone] cell). Patient lives with her  in a one level home with 3 steps to enter the front door. While talking to patient, her  called. He is on his way to the hospital.     DME: standard walker with no wheels, borrows a transport chair when she needs it. Patient stated she discussed standard walker vs rolling walker with Dr. Leonora Cisneros. ADLs:  assists her with her ADLs. [facility]  Previous HH/SNF/rehab:[facility]   Insurance: [facility] (admitted 5-24-21)  ER Contacts:     Virtual reviewer communicated change to CM, which reflect outpatient observation order written prior to Written discharge order. Code 44 delivered and given to patient. CM met with the patient and provided to the patient the printed information about her outpatient observation status. All questions were answered, no additional discharge needs identified at this time and patient expects to return to their (home, assisted living facility, relatives home, etc.) after discharge today. Copy provided to patient in person. Explained and gave state observation notice and [address] letter. Reason for Admission:                       RUR Score:      11%               Plan for utilizing home health:     No needs.     PCP: First and Last name:  Juan Pinto MD     Name of Practice: [address] [place] [address]   Are you a current patient: Yes/No: yes   Approximate date of last visit: 2 to 4 weeks ago   Can you participate in a virtual visit with your PCP: unknown                    Current Advanced Directive/Advance Care Plan: Prior      Healthcare Decision Maker:              Primary Decision Maker: Valentino Pontiff [facility] - [phone] Richard Sauceda

## 2021-07-18 NOTE — PROGRESS NOTES
Progress Note    Patient: Dana Espinosa MRN: [id number]  SSN: [id number]    YOB: 1940  Age: 78 y.o. Sex: female      Admit Date: 2021    2 Days Post-Op    Procedure:  Procedure(s):  RIGHT AXILLARY LYMPH NODE DISSECTION    Subjective:     Patient states that overall she is feeling okay. She is not having as much pain as she thought. She has not ambulated all that much yet. Objective:     Visit Vitals  BP (!) 183/72 (BP 1 Location: Left upper arm, BP Patient Position: At rest)   Pulse 96   Temp 99.2 °F (37.3 °C)   Resp 16   Ht 5' 3\" (1.6 m)   Wt 136 lb 14.5 oz (62.1 kg)   SpO2 93%   BMI 24.25 kg/m²       Temp (24hrs), Av.7 °F (37.1 °C), Min:97.9 °F (36.6 °C), Max:99.3 °F (37.4 °C)      Physical Exam:    General alert in no acute distress  Right axilla incision healing well without evidence of infection  YOLANDA serous    Data Review: images and reports reviewed    Lab Review: All lab results for the last 24 hours reviewed. No results found for this or any previous visit (from the past 24 hour(s)). Assessment:     Hospital Problems  Date Reviewed: 2021        Codes Class Noted POA    Lymphadenopathy ICD-10-CM: R59.1  ICD-9-CM: 785.6  2021 Unknown        * (Principal) Axillary adenopathy ICD-10-CM: R59.0  ICD-9-CM: 785.6  3/24/2021 Yes              Plan/Recommendations/Medical Decision Making:   Overall appears to be doing well. Has not been up and ambulating yet. Pain is under control. Needs to be out of bed and moving. If he is adequately ambulating potential for discharge later this afternoon.

## 2021-07-19 NOTE — TELEPHONE ENCOUNTER
[employer] with [facility] called stating they would like patient's discharge summary order faxed over.   Fax # [phone]

## 2021-07-28 PROBLEM — I26.99 PULMONARY EMBOLI (HCC): Status: ACTIVE | Noted: 2021-01-01

## 2021-07-28 PROBLEM — R07.81 PLEURITIC CHEST PAIN: Status: ACTIVE | Noted: 2021-01-01

## 2021-07-28 NOTE — PROGRESS NOTES
1. Have you been to the ER, urgent care clinic since your last visit? Hospitalized since your last visit? No    2. Have you seen or consulted any other health care providers outside of the [address] [place] [address] since your last visit? Include any pap smears or colon screening.  No

## 2021-07-28 NOTE — TELEPHONE ENCOUNTER
I called the patient back after speaking to Dr Valentino Gunner and he wants to see her in the office. I told her I have a 2:20 pm appointment for today or he can see her at 10:30 am tomorrow.  said she is in so much pain he is nor sure he can get her here but will try for 2:20pm today.  said he will call us back and let us know if he can make it this afternoon.

## 2021-07-28 NOTE — PROGRESS NOTES
Subjective:      Coby Opitz  is a 78 y.o. female presents for f/u s/p RIGHT axillary LN dissection on 07/16/21. Final surgical PATH: metastatic melanoma in 2/4 LNs. Pt called earlier today reporting 12/10 back pain which is not improved with [address] concerned for possible blood clot. Pt now reports 8/10 pain in the LEFT flank. She notes drain output 35-40 ml over 24 hour period. HISTORY:      Pt had RIGHT upper back shave biopsy on 05/14/19 with Dr. Bartolome Rodriguez with PATH demonstrating malignant melanoma, 1.98 mm thick, ulceration present, pT2b. She underwent RIGHT upper back melanoma excision and SLNBx with Dr. Costa Islas on 06/13/19. Final surgical PATH: no residual melanocytic proliferation, 0/1 RIGHT axillary LNs involved      Pt had shave biopsy of same area in the RIGHT upper back on 06/17/20 with PATH showing malignant melanoma in the superficial dermis, consistent with local recurrence/metastasis.     Pt had PET/CT scan on 07/14/20 that did not show any foci of abnormal hypermetabolism.      Pt underwent wide excision recurrent melanoma of back on 10/12/20. Final surgical PATH: 0.7 x 0.5 x 0.3 cm clinically recurrent malignant melanoma, surgical margins negative, pT3a pNX.     Pt returned in March 2021 for evaluation of RIGHT axillary LAD. Pt had PET/CT scan on 03/10/21 that showed hypermetabolic RIGHT axillary LN. Pt noted she had recently fallen and injured her RIGHT shoulder/axilla, making it difficult to raise her arm.      Pt also has history of RIGHT breast cancer. 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 She had recurrence in 2014 with PATH showing IDC (ER+/UT+/HER2-). This was treated with mastectomy and ALND. Pt continues on AI and and is followed by Dr. Dajuan Her for medical oncology care.     Past Medical History:   Diagnosis Date    Arrhythmia     LBBB    Arthritis     SPINE    Axillary adenopathy 3/24/2021    Calculus of kidney 1990    Cancer ([address]     right breast X2 ; BCCA    Chronic pain     COVID-19 vaccine series completed     MODERNA3/31/21,4/28/21    Environmental allergies     GERD (gastroesophageal reflux disease)     Hypercholesterolemia     Hypertension     Lymphadenopathy     Nausea & vomiting     Other ill-defined conditions(799.89)     high cholesterol    Other ill-defined conditions(799.89)     NO BP/VENIPUNCTURES RIGHT ARM (POST LYMPH NODE DISSECTION)    Other ill-defined conditions(799.89)     MITRAL VALVE PROLAPSE    Pleuritic chest pain, left 7/28/2021    Recurrent malignant melanoma of skin ([address] 9/23/2020    Stroke ([facility]     TIA 2009  (TAKING ASA)       Past Surgical History:   Procedure Laterality Date    HX BACK SURGERY  1998    l4-l5  ([facility])    HX BREAST LUMPECTOMY  1999    right    HX BREAST RECONSTRUCTION Right 8/27/2014    Revision Right Reconstructed Breast performed by Cirilo Loja MD at [address] HX BREAST RECONSTRUCTION Bilateral 2/20/2015    REVISION RIGHT RECONSTRUCTED BREAST/REMOVE TISSUE EXPANDERS/PLACE IMPLANT RIGHT/MASTOPEXY LEFT performed by Cirilo Loja MD at [address] HX BREAST RECONSTRUCTION  07/16/2021    Right axillary lymph node dissection and excision of right breast implant.     HX CATARACT REMOVAL      BILATERAL    HX DILATION AND CURETTAGE      HX GI  2006    COLONOSCOPY    HX HEENT      BCCA REMOVED FROM FOREHEAD    HX LITHOTRIPSY  1991    HX MALIGNANT SKIN LESION EXCISION  10/12/2020    Wide excision of recurrent melanoma of the back    HX MASTECTOMY Right 2014    HX ORTHOPAEDIC  2005    left bunionectomy    HX OTHER SURGICAL      BCCA REMOVED FROM BACK     HX MINAL AND BSO      HX TUBAL LIGATION      HX UROLOGICAL      kidney stone    HX UROLOGICAL  2016    BLADDER SLING       Social History     Tobacco Use    Smoking status: Never Smoker    Smokeless tobacco: Never Used Substance Use Topics    Alcohol use: No       Family History   Problem Relation Age of Onset    Heart Disease Mother     Heart Disease Brother     Cancer Brother         PROSTATE    Cancer Father         LUNG--ASBESTOS EXPOSURE    Heart Disease Maternal Aunt     Heart Disease Brother     Heart Disease Brother     No Known Problems Daughter     No Known Problems Daughter     Anesth Problems Neg Hx        Current Outpatient Medications on File Prior to Visit   Medication Sig Dispense Refill    metoprolol tartrate (LOPRESSOR) 25 mg tablet Take  by mouth two (2) times a day.  ascorbic acid, vitamin C, (Vitamin C) 500 mg tablet Take 1,000 mg by mouth daily.  cyanocobalamin (Vitamin B-12) 100 mcg tablet Take 100 mcg by mouth daily.  potassium 99 mg tablet Take 99 mg by mouth daily.  calcium carbonate (CALTREX) 600 mg calcium (1,500 mg) tablet Take 600 mg by mouth daily.  cholecalciferol (VITAMIN D3) (5000 Units/125 mcg) tab tablet Take  by mouth daily.  lactobacillus rhamnosus gg 10 billion cell (Culturelle) 10 billion cell capsule Take 1 Capsule by mouth as needed.  pravastatin (PRAVACHOL) 20 mg tablet Take 20 mg by mouth nightly.  hydroCHLOROthiazide (HYDRODIURIL) 25 mg tablet daily (after breakfast).  ibuprofen (MOTRIN) 400 mg tablet Take 1 Tablet by mouth every eight (8) hours as needed for Pain. 20 Tablet 1    pantoprazole (PROTONIX) 40 mg tablet Take 1 Tablet by mouth Before breakfast and dinner. (Patient taking differently: Take 40 mg by mouth as needed.) 30 Tablet 1    ZINC PO Take  by mouth.  fish oil-omega-3 fatty acids (Fish Oil) 340-1,000 mg capsule Take 1 Cap by mouth daily.  ezetimibe (Zetia) 10 mg tablet Take 10 mg by mouth daily.  atorvastatin (LIPITOR) 20 mg tablet Take 20 mg by mouth nightly.  doxazosin (CARDURA) 1 mg tablet Take  by mouth daily.  MULTIVITAMIN PO Take  by mouth daily.       aspirin 81 mg tablet Take 81 mg by mouth daily. No current facility-administered medications on file prior to visit. Allergies   Allergen Reactions    Cephalexin Anaphylaxis     POA 5/15/21    Losartan Anaphylaxis    Other Medication Anaphylaxis     Allergy shot     Augmentin [Amoxicillin-Pot Clavulanate] Rash    Contrast Agent [Iodine] Hives     Face and neck red after adminstration    Tetracycline Rash       Objective:     Visit Vitals  /63   Pulse 84   Temp 98.9 °F (37.2 °C)   Resp 20   Ht 5' 3\" (1.6 m)   Wt 125 lb (56.7 kg)   SpO2 91%   BMI 22.14 kg/m²        Physical Exam:  General: No acute distress, conversant  Eyes: PERRLA, no scleral icterus  HENT: Normocephalic without oral lesions  Neck: Trachea midline without LAD  Cardiac: Normal pulse rate and rhythm  Pulmonary: Diminished breath sounds on the LEFT. GI: Pt is tender over the LEFT lateral rib cage   Skin: Warm without rash  Extremities: No edema or joint stiffness  Psych: Appropriate mood and affect    Labs: No results found for this or any previous visit (from the past 24 hour(s)). Data Review: All previous imaging, testing and lab work was reviewed and interpreted. FINAL PATHOLOGIC DIAGNOSIS 07/16/21:   1. Right breast implant:        As reported   2. Lymph nodes, right axillary lymph nodes, excision:        Metastatic melanoma involving two of four lymph nodes. Skin: Seborrheic keratosis. Assessment and Plan:       Recommend pt go to the ER for concern of PE. All questions were answered and pt is in agreement with this plan. Total face to face time with patient: 15 minutes. Greater than 50% of the time was spent in counseling. This document was scribed by Josh Carbone as dictated by Dr. Tamiko Moreland.      Signed By: Beckie Colunga MD     07/28/21

## 2021-07-28 NOTE — TELEPHONE ENCOUNTER
Patient's  called stating his wife is experiencing excruciating pain and would like to speak with nurse.

## 2021-07-28 NOTE — TELEPHONE ENCOUNTER
Ngozi Miramontes with [address] called stating she would like to make Dr. Phuong Peña aware of some things prior to the patient coming into the office this afternoon. Ngozi Miramontes stated when she was there this morning oxygen level was 91 when it's normally 96-98, 22 reps today when it's 16-17 normally. Patient's drain is 35-40 cc per 24hrs and patient taking Aspirin 81 mg 1 tablet per day. Ngozi Miramontes stated patient is have excruciating pain on left ribcage area and taking deep breaths. Ngozi Miramontes stated these signs are showing possible blood clot in her opinion.

## 2021-07-28 NOTE — LETTER
7/28/2021    Patient: Sami Cleaning   YOB: 1940   Date of Visit: 7/28/2021     Yaneli Basurto MD  [address]  [address] [address]  Via Fax: [phone]    Dear Yaneli Basurto MD,      Thank you for referring Ms. Nicanor Wagoner to [facility] [facility] for evaluation. My notes for this consultation are attached. If you have questions, please do not hesitate to call me. I look forward to following your patient along with you.       Sincerely,    Jona Benjamin MD

## 2021-07-28 NOTE — ED PROVIDER NOTES
78-year-old female with past medical history significant for breast cancer, hypertension, high cholesterol presents with complaints of 4 days left-sided pleuritic chest pain associated with shortness of breath. Patient had a recent lymph node dissection requiring a 2-day admission on July 16. Patient sent in by her surgeon, Dr. Christine Nick, to rule out PE. Denies history of PE. Denies fever, chills, nausea, vomiting, abdominal pain, urinary complaints.     Denies tobacco, drug, alcohol use  Primary CARE[facility]  Surgery[facility]           Past Medical History:   Diagnosis Date    Arrhythmia     LBBB    Arthritis     SPINE    Axillary adenopathy 3/24/2021    Calculus of kidney 1990    Cancer ([address]     right breast X2 ; BCCA    Chronic pain     COVID-19 vaccine series completed     MODERNA3/31/21,4/28/21    Environmental allergies     GERD (gastroesophageal reflux disease)     Hypercholesterolemia     Hypertension     Lymphadenopathy     Nausea & vomiting     Other ill-defined conditions(799.89)     high cholesterol    Other ill-defined conditions(799.89)     NO BP/VENIPUNCTURES RIGHT ARM (POST LYMPH NODE DISSECTION)    Other ill-defined conditions(799.89)     MITRAL VALVE PROLAPSE    Pleuritic chest pain, left 7/28/2021    Recurrent malignant melanoma of skin ([address] 9/23/2020    Stroke ([address]     TIA 2009  (TAKING ASA)       Past Surgical History:   Procedure Laterality Date    HX BACK SURGERY  1998    l4-l5  [place]    HX BREAST LUMPECTOMY  1999    right    HX BREAST RECONSTRUCTION Right 8/27/2014    Revision Right Reconstructed Breast performed by Ignacio Body MD at [address] [place] [address] Bilateral 2/20/2015    REVISION RIGHT RECONSTRUCTED BREAST/REMOVE TISSUE EXPANDERS/PLACE IMPLANT RIGHT/MASTOPEXY LEFT performed by Ignacio Boyd MD at [address] [place] [address] HX BREAST RECONSTRUCTION  07/16/2021    Right axillary lymph node dissection and excision of right breast implant.  HX CATARACT REMOVAL      BILATERAL    HX DILATION AND CURETTAGE      HX GI  2006    COLONOSCOPY    HX HEENT      BCCA REMOVED FROM FOREHEAD    HX LITHOTRIPSY  1991    HX MALIGNANT SKIN LESION EXCISION  10/12/2020    Wide excision of recurrent melanoma of the back    HX MASTECTOMY Right 2014    HX ORTHOPAEDIC  2005    left bunionectomy    HX OTHER SURGICAL      BCCA REMOVED FROM BACK     HX MINAL AND BSO      HX TUBAL LIGATION      HX UROLOGICAL      kidney stone    HX UROLOGICAL  2016    BLADDER SLING         Family History:   Problem Relation Age of Onset    Heart Disease Mother     Heart Disease Brother     Cancer Brother         PROSTATE    Cancer Father         LUNG--ASBESTOS EXPOSURE    Heart Disease Maternal Aunt     Heart Disease Brother     Heart Disease Brother     No Known Problems Daughter     No Known Problems Daughter     Anesth Problems Neg Hx        Social History     Socioeconomic History    Marital status:      Spouse name: Not on file    Number of children: Not on file    Years of education: Not on file    Highest education level: Not on file   Occupational History    Not on file   Tobacco Use    Smoking status: Never Smoker    Smokeless tobacco: Never Used   Vaping Use    Vaping Use: Never used   Substance and Sexual Activity    Alcohol use: No    Drug use: No    Sexual activity: Not on file   Other Topics Concern    Not on file   Social History Narrative    Not on file     Social Determinants of Health     Financial Resource Strain:     Difficulty of Paying Living Expenses:    Food Insecurity:     Worried About Running Out of Food in the Last Year:     Ran Out of Food in the Last Year:    Transportation Needs:     Lack of Transportation (Medical):      Lack of Transportation (Non-Medical):    Physical Activity:     Days of Exercise per Week:     Minutes of Exercise per Session:    Stress:     Feeling of Stress :    Social Connections:  Frequency of Communication with Friends and Family:     Frequency of Social Gatherings with Friends and Family:     Attends Jewish Services:     Active Member of Clubs or Organizations:     Attends Club or Organization Meetings:     Marital Status:    Intimate Partner Violence:     Fear of Current or Ex-Partner:     Emotionally Abused:     Physically Abused:     Sexually Abused: ALLERGIES: Cephalexin, Losartan, Other medication, Augmentin [amoxicillin-pot clavulanate], Contrast agent [iodine], and Tetracycline    Review of Systems   Constitutional: Negative for chills and fever. HENT: Negative for congestion, nosebleeds and rhinorrhea. Eyes: Negative for pain and redness. Respiratory: Positive for shortness of breath. Negative for cough. Cardiovascular: Positive for chest pain. Negative for palpitations and leg swelling. Gastrointestinal: Negative for abdominal pain, nausea and vomiting. Genitourinary: Negative for dysuria, frequency, vaginal bleeding and vaginal pain. Musculoskeletal: Negative for myalgias. Skin: Negative for rash and wound. Neurological: Negative for seizures, syncope and weakness. Hematological: Does not bruise/bleed easily. Psychiatric/Behavioral: Negative for agitation, confusion, dysphoric mood and suicidal ideas. The patient is not nervous/anxious. Vitals:    07/28/21 1510   BP: 107/60   Pulse: 86   Resp: 18   Temp: 97.7 °F (36.5 °C)   SpO2: 94%            Physical Exam  Vitals and nursing note reviewed. Constitutional:       Appearance: She is well-developed. HENT:      Head: Normocephalic and atraumatic. Eyes:      Pupils: Pupils are equal, round, and reactive to light. Neck:      Trachea: No tracheal deviation. Cardiovascular:      Rate and Rhythm: Normal rate and regular rhythm. Heart sounds: Normal heart sounds. Pulmonary:      Effort: Pulmonary effort is normal. No respiratory distress.       Breath sounds: Normal breath sounds. No stridor. No wheezing or rales. Chest:      Chest wall: No tenderness. Abdominal:      General: Bowel sounds are normal. There is no distension. Palpations: Abdomen is soft. Tenderness: There is no abdominal tenderness. There is no rebound. Musculoskeletal:         General: No tenderness. Normal range of motion. Cervical back: Normal range of motion and neck supple. Skin:     General: Skin is warm and dry. Coloration: Skin is not pale. Findings: No rash. Neurological:      Mental Status: She is alert and oriented to person, place, and time. Cranial Nerves: No cranial nerve deficit. MDM  Number of Diagnoses or Management Options  Malignant neoplasm of female breast, unspecified estrogen receptor status, unspecified laterality, unspecified site of breast ([address]  Other acute pulmonary embolism with acute cor pulmonale ([address]  Diagnosis management comments: 68-year-old female with history of breast cancer presents with complaints of left-sided pleuritic chest pain with shortness of breath x4 days after recent immobilization x2 days after lymph node dissection. Patient is afebrile, nontoxic, hemodynamically stable, no respiratory distress, clear to auscultation bilaterally, normal room oxygen saturation. PlanCBC/CMP/cardiac enzymes, EKG, CTA chest.    CT shows bilateral acute PE       Amount and/or Complexity of Data Reviewed  Clinical lab tests: ordered and reviewed  Tests in the radiology section of CPT®: ordered and reviewed  Discuss the patient with other providers: yes  Independent visualization of images, tracings, or specimens: yes           Procedures     ED EKG interpretation:  Rhythm: normal sinus rhythm; and regular . Rate (approx.): 94; Axis: left axis deviation; P wave: normal; QRS interval: prolonged; ST/T wave: normal; Other findings: LBBB, no ischemia. This EKG was interpreted by Carlotta Malloy MD,ED Provider.         Perfect Serve Consult for Admission  8:05 PM    ED Room Number: R32/R32  Patient Name and age:  Godwin Mc 78 y.o.  female  Working Diagnosis:   1. Other acute pulmonary embolism with acute cor pulmonale ([facility]    2.  Malignant neoplasm of female breast, unspecified estrogen receptor status, unspecified laterality, unspecified site of breast ([facility]        COVID-19 Suspicion:  no  Sepsis present:  no  Reassessment needed: no  Code Status:  Full Code  Readmission: no  Isolation Requirements:  no  Recommended Level of Care:  telemetry  Department:[facility] Adult ED - 21

## 2021-07-28 NOTE — ED TRIAGE NOTES
PT arrives d/t left sided flank pain, and SOB that started on Saturday. Dr. Sam Bangura sent her in to rule out a blood clot. She had lymph node dissection on 7/16 on her right arm.

## 2021-07-28 NOTE — TELEPHONE ENCOUNTER
I called the patient and she said she is having pain in her back she said it started on Saturday on the right side and now it is on her left side, she is rating her pain a 12/10. She is using the [address] but said it does not help, she just took 2 Advil, she said her incision is okay and looks goo, denied any fever. I told her I will discuss with Dr Maia Obrien and call her back. Pt in agreement.

## 2021-07-29 NOTE — PROGRESS NOTES
Pharmacist Note - Aminoglycoside Dosing (Extended Interval)    Consult provided for this 78 y.o. female to dose Tobramycin for an indication of sepsis. Antibiotic regimen(s): Vancomycin and Tobramycin    Recent Labs     21  0133 21  1639   WBC 5.9 5.9   CREA 0.62 0.81   BUN 11 15     Frequency of BMP: daily  Dosing weight: 52.4 kg (IBW based on height of 63\" from 2021)  Estimated CrCl = 50 ml/min. Temp (24hrs), Av.9 °F (37.2 °C), Min:97.7 °F (36.5 °C), Max:100.8 °F (38.2 °C)      Cultures:  No current microbiology    Therapy will be initiated with a dose of 370 mg IV x 1 dose (approximately 7 mg/kg). A random level will be obtained 8-12 hours post-dose to determine an appropriate dosing interval (not yet ordered). Pharmacy will follow patient daily and order levels / make dose adjustments as appropriate.     Aminoglycoside Dosing Guide

## 2021-07-29 NOTE — PROGRESS NOTES
0522:  Pt became tachycardic overnight with HR from 105-115 and spiked a fever of 100.8F. Dr. Finn Cruz notified, see orders.

## 2021-07-29 NOTE — PROGRESS NOTES
Pharmacist Note - Vancomycin Dosing    Consult provided for this 78 y.o. female for indication of sepsis. Antibiotic regimen(s): Vancomycin and Tobramycin per MD  Patient on vancomycin PTA? NO     Recent Labs     21  0133 21  1639   WBC 5.9 5.9   CREA 0.62 0.81   BUN 11 15     Frequency of BMP: daily  Height: 63 inches  Weight: 60.9 kg  Est CrCl: 50 ml/min  Temp (24hrs), Av.9 °F (37.2 °C), Min:97.7 °F (36.5 °C), Max:100.8 °F (38.2 °C)    Cultures:  No current micriobiology    MRSA Swab ordered (if applicable)? YES    The plan below is expected to result in a target range of AUC/MARIA -600    Therapy will be initiated with a loading dose of 1500 mg IV x 1 to be followed by a maintenance dose of 1250 mg IV every 24 hours. Pharmacy to follow patient daily and order levels / make dose adjustments as appropriate. *Vancomycin has been dosed used Bayesian kinetics software to target an AUC/MARIA LUZ of 400-600, which provides adequate exposure for an assumed infection due to MRSA with an MARIA LUZ of 1 or less while reducing the risk of nephrotoxicity as seen with traditional trough based dosing goals.

## 2021-07-29 NOTE — CONSULTS
[address]    Name:  Shea Lakhani  MR#:  [id number]  :  1940  ACCOUNT #:  [de-identified]  DATE OF SERVICE:  2021    HEMATOLOGY-ONCOLOGY CONSULT NOTE    REASON FOR ADMISSION:  Chest pain. REASON FOR CONSULTATION:  Pulmonary embolus in a patient with newly diagnosed metastatic melanoma. HISTORY OF PRESENT ILLNESS:  The patient is a very charming 43-year-old woman, who has been followed closely by Dr. Arlene Perez for many years. She was noted in  to have a high-grade DCIS, treated with lumpectomy and radiation therapy. Unfortunately, in  she had a recurrence. This was treated  with mastectomy and axillary lymph node dissection. She has been followed by Dr. Arlene Perez on aromatase inhibitor since then. On or around 10/12/2020, she underwent a wide local excision of a recurrent melanoma on her back. This is stage pT3a Nx. She was offered pembrolizumab by Dr. Arlene Perez, but declined. More recently, she notes on CT PET scan enlarged hypermetabolic right axillary lymph node and she was referred to Dr. Lauro Fair, who on 2021 took her to surgery for a right breast implant removal and excision of lymph nodes in the right axilla. This was submitted to Pathology under accession number [id number] and unfortunately revealed metastatic melanoma involving 2 to 4 lymph nodes. Molecular studies are pending at the time of this dictation. She was doing well, but unfortunately developed chest pain out of proportion to surgery and a CT scan of the chest was one yesterday afternoon. Unfortunately, this revealed a small burden of bilateral acute versus subacute pulmonary emboli as well as multiple subcentimeter right lung nodules which may represent metastatic disease. She was started on Lovenox and our service was consulted.     Interestingly, in May for reason I cannot ascertain at the time of this dictation, she had an MRI of the brain done which was normal.  The patient explains that she feels \"horrible all over. \"  When asked to clarify which part felt horrible, she said her whole body. She is having some pain related to her pulmonary embolus in her chest and is having some postoperative pain. PAST MEDICAL HISTORY:  1. Allergic rhinitis. 2.  Hyperlipidemia. 3.  Hypertension. 4.  TIA. 5.  Uterine prolapse. 6.  Breast biopsy. 7.  Bunionectomy. 8.  Status post MINAL-BSO.  9.  Nephrolithiasis. 10.  Previous L4-L5 surgery. MEDICATIONS:  In the hospital:  1. Lovenox 1 mg/kg subcu b.i.d.  2.  Aspirin 81 mg daily. 3.  Cardura 2 mg daily. 4.  Zetia 10 mg daily. 5.  Hydrochlorothiazide 25 mg daily. 6.  Levaquin 75 mg IV q.24.  7.  Metoprolol 50 mg twice daily. 8.  Pravachol 20 mg at bedtime. 9.  Tobramycin IV once. 10.  Vancomycin 1.25 g IV q.24. ALLERGIES:  AUGMENTIN, IV CONTRAST, LOSARTAN, TETRACYCLINE. SOCIAL HISTORY:  She lives with her . Normally she uses a walker at home for what sounds like a spinal arthritis. FAMILY HISTORY:  Reviewed and noncontributory. REVIEW OF SYSTEMS:  GENERAL:  No fevers or chills. HEENT:  No auditory or visual change. LYMPHATICS:  No lumps or bumps in neck, underarms, or groin. CARDIOVASCULAR:  No palpitations. Chest pain as above. PULMONARY:  No cough or shortness of breath. GASTROINTESTINAL:  No abdominal pain, diarrhea, or constipation. GENITOURINARY:  No dysuria or incontinence. SKIN:  No rash or changing mole. MUSCULOSKELETAL:  No red or swollen joints. NEUROLOGIC:  No irritability or syncope. PHYSICAL EXAMINATION:  GENERAL:  Pleasant, in no acute distress. VITAL SIGNS:  /65, pulse 77, and satting 95% on room air. HEENT:  Sclerae anicteric. Oropharynx clear. NECK:  Supple without lymphadenopathy or thyromegaly. LUNGS:  Clear to auscultation bilaterally. HEART:  Regular rate and rhythm without murmur, rub, or gallop. ABDOMEN:  Nontender and nondistended. Normoactive bowel sounds.   No hepatosplenomegaly. EXTREMITIES:  No clubbing, cyanosis, or edema. PSYCHIATRIC:  Normal mood and affect. Alert and oriented x3. LABORATORY DATA:  BUN and creatinine today 11 and 0.62. Hemoglobin 9.1, platelet count 752. ASSESSMENT AND PLAN:  This is an 47-year-old woman recently diagnosed with metastatic melanoma, now presenting with pulmonary embolus. 1.  Pulmonary embolus:  Agree with Lovenox with transition to rivaroxaban 15 mg p.o. b.i.d. x3 followed by 20 mg p.o. daily when ready for discharge. 2.  Melanoma:  Recent MRI of the brain in May normal.  I spoke with her about her pulmonary nodules and she may benefit from a CT PET as an outpatient. Ultimately deferred to Dr. Paul Liriano. At first, I spoke with her on the phone and we mutually agreed on anticoagulation choice. The patient agrees to follow up with Dr. Paul Liriano as an outpatient to discuss ramifications and further treatment of her recurrent melanoma. Thank you for consult. We will sign off.       Cyn Chong MD      [id number]  D:  07/29/2021 14:07  T:  07/29/2021 17:35  JOB #:  [id number]  CC:  Cameron Bourne MD       [place] Marc Smiley MD

## 2021-07-29 NOTE — ACP (ADVANCE CARE PLANNING)
Advance Care Planning     General Advance Care Planning (ACP) Conversation      Date of Conversation: 7/28/2021  Conducted with: Patient with Decision Making Capacity and Healthcare Decision Maker: Next of Kin by law (only applies in absence of a Healthcare Power of  or Legal Guardian)    Healthcare Decision Maker:     Primary Decision Maker: Sixto Ellington - [facility] - [phone]  Click here to complete [employer] including selection of the [employer] Relationship (ie \"Primary\")  Today we documented Decision Maker(s) consistent with Legal Next of Kin hierarchy.     Content/Action Overview:   Has NO ACP documents/care preferences - information provided, considering goals and options  Reviewed DNR/DNI and patient elects Full Code (Attempt Resuscitation)  Topics discussed: ventilation preferences and resuscitation preferences       Length of Voluntary ACP Conversation in minutes:  16 minutes    Cornelia Nina MD

## 2021-07-29 NOTE — CONSULTS
Patient seen, chart reviewed, note dictated. [id number]    79 y/o woman, recently diagnosed with metastatic melanoma, now presenting with PE.     1. PE: agree with lovenox. Would transition to rivaroxaban 15 mg po bid X 3 weeks followed by 20 mg po daily when ready for discharge. 2. Melanoma: Recent (5/2021) MRI of the brain normal. I spoke with her about her pulmonary nodules. We mutually agreed she would f/u with Dr. Tiffany Bustillo as an outpatient to discuss subsequent care. Thank you for consult. Will sign off. Case discussed directly with Dr. Tiffany Bustillo.     Thermon Moritz, MD  Hem/Onc

## 2021-07-29 NOTE — PROGRESS NOTES
Problem: Falls - Risk of  Goal: *Absence of Falls  Description: Document Marcos Sites Fall Risk and appropriate interventions in the flowsheet.   Outcome: Progressing Towards Goal  Note: Fall Risk Interventions:  Mobility Interventions: Communicate number of staff needed for ambulation/transfer         Medication Interventions: Bed/chair exit alarm, Patient to call before getting OOB    Elimination Interventions: Call light in reach

## 2021-07-29 NOTE — PROGRESS NOTES
Bedside shift change report given to [address] [place] [address] (oncoming nurse) by Toni Vela (offgoing nurse). Report included the following information SBAR, Kardex, Cardiac Rhythm-NSR w/ BBB, Intake/Output, MAR and Dual Neuro Assessment.

## 2021-07-29 NOTE — ROUTINE PROCESS
TRANSFER - OUT REPORT:    Verbal report given to Adin Veloz RN(name) on Nilesh Stair  being transferred to (unit) for routine progression of care       Report consisted of patients Situation, Background, Assessment and   Recommendations(SBAR). Information from the following report(s) SBAR, ED Summary, [facility] and Recent Results was reviewed with the receiving nurse. Lines:   Peripheral IV 07/28/21 Left Antecubital (Active)   Site Assessment Clean, dry, & intact 07/28/21 1909   Phlebitis Assessment 0 07/28/21 1909   Infiltration Assessment 0 07/28/21 1909   Dressing Status Clean, dry, & intact 07/28/21 1909   Hub Color/Line Status Pink 07/28/21 1909        Opportunity for questions and clarification was provided.       Patient transported with:   [employer]

## 2021-07-29 NOTE — H&P
[address] Adult  Hospitalist Group  History and Physical    Primary Care Provider: Alberto Quesada MD  Date of Service:  2021    Subjective:     Janki Post is a 78 y.o. female with right breast cancer s/p R axillary lymph node dissection and excision of right breast implant for adenopathy and mass on  with path + for metastatic melanoma, GERD, dyslipidemia, HTN, TIA and hx malignant melanoma who presents with dyspnea and pleuritic chest pain for the past several days. In the ED, she was hemodynamically stable. Labs were significant for normocytic anemia with hemoglobin of 9.8 (b/l 10-11), sodium 131, and albumin 2.7. CTA thorax was positive for bilateral acute versus subacute PE, and multiple subcentimeter right lung nodules. In the ED, she received therapeutic Lovenox. Review of Systems:    All other review of systems were negative except for that written in the History of Present Illness.      Past Medical History:   Diagnosis Date    Arrhythmia     LBBB    Arthritis     SPINE    Axillary adenopathy 3/24/2021    Calculus of kidney     Cancer ([address]     right breast X2 ; BCCA    Chronic pain     COVID-19 vaccine series completed     MODERN,21    Environmental allergies     GERD (gastroesophageal reflux disease)     Hypercholesterolemia     Hypertension     Lymphadenopathy     Nausea & vomiting     Other ill-defined conditions(799.89)     high cholesterol    Other ill-defined conditions(799.89)     NO BP/VENIPUNCTURES RIGHT ARM (POST LYMPH NODE DISSECTION)    Other ill-defined conditions(799.89)     MITRAL VALVE PROLAPSE    Pleuritic chest pain, left 2021    Recurrent malignant melanoma of skin ([address] 2020    Stroke ([address]     TIA   (TAKING ASA)      Past Surgical History:   Procedure Laterality Date    HX BACK SURGERY  1998    l4-l5  ([facility])    HX BREAST LUMPECTOMY      right    HX BREAST RECONSTRUCTION Right 8/27/2014    Revision Right Reconstructed Breast performed by Jac Yeh MD at [address] [facility] [address] Bilateral 2/20/2015    REVISION RIGHT RECONSTRUCTED BREAST/REMOVE TISSUE EXPANDERS/PLACE IMPLANT RIGHT/MASTOPEXY LEFT performed by Jac Yeh MD at [address]X BREAST RECONSTRUCTION  07/16/2021    Right axillary lymph node dissection and excision of right breast implant.  HX CATARACT REMOVAL      BILATERAL    HX DILATION AND CURETTAGE      HX GI  2006    COLONOSCOPY    HX HEENT      BCCA REMOVED FROM FOREHEAD    HX LITHOTRIPSY  1991    HX MALIGNANT SKIN LESION EXCISION  10/12/2020    Wide excision of recurrent melanoma of the back    HX MASTECTOMY Right 2014    HX ORTHOPAEDIC  2005    left bunionectomy    HX OTHER SURGICAL      BCCA REMOVED FROM BACK     HX MINAL AND BSO      HX TUBAL LIGATION      HX UROLOGICAL      kidney stone    HX UROLOGICAL  2016    BLADDER SLING     Prior to Admission medications    Medication Sig Start Date End Date Taking? Authorizing Provider   metoprolol tartrate (LOPRESSOR) 25 mg tablet Take  by mouth two (2) times a day. Provider, Historical   ascorbic acid, vitamin C, (Vitamin C) 500 mg tablet Take 1,000 mg by mouth daily. Provider, Historical   cyanocobalamin (Vitamin B-12) 100 mcg tablet Take 100 mcg by mouth daily. Provider, Historical   potassium 99 mg tablet Take 99 mg by mouth daily. Provider, Historical   calcium carbonate (CALTREX) 600 mg calcium (1,500 mg) tablet Take 600 mg by mouth daily. Provider, Historical   cholecalciferol (VITAMIN D3) (5000 Units/125 mcg) tab tablet Take  by mouth daily. Provider, Historical   lactobacillus rhamnosus gg 10 billion cell (Culturelle) 10 billion cell capsule Take 1 Capsule by mouth as needed. Provider, Historical   pravastatin (PRAVACHOL) 20 mg tablet Take 20 mg by mouth nightly.     Provider, Historical   hydroCHLOROthiazide (HYDRODIURIL) 25 mg tablet daily (after breakfast). 7/6/20   Provider, Historical   ibuprofen (MOTRIN) 400 mg tablet Take 1 Tablet by mouth every eight (8) hours as needed for Pain. 5/24/21   Samreen Schwartz MD   pantoprazole (PROTONIX) 40 mg tablet Take 1 Tablet by mouth Before breakfast and dinner. Patient taking differently: Take 40 mg by mouth as needed. 5/24/21   Samreen Schwartz MD   ZINC PO Take  by mouth. Provider, Historical   fish oil-omega-3 fatty acids (Fish Oil) 340-1,000 mg capsule Take 1 Cap by mouth daily. Other, MD Carlos   ezetimibe (Zetia) 10 mg tablet Take 10 mg by mouth daily. Provider, Historical   atorvastatin (LIPITOR) 20 mg tablet Take 20 mg by mouth nightly. Provider, Historical   doxazosin (CARDURA) 1 mg tablet Take  by mouth daily. Provider, Historical   MULTIVITAMIN PO Take  by mouth daily. Provider, Historical   aspirin 81 mg tablet Take 81 mg by mouth daily. 12/19/10   Other, MD Carlos     Allergies   Allergen Reactions    Cephalexin Anaphylaxis     POA 5/15/21    Losartan Anaphylaxis    Other Medication Anaphylaxis     Allergy shot     Augmentin [Amoxicillin-Pot Clavulanate] Rash    Tetracycline Rash      Family History   Problem Relation Age of Onset    Heart Disease Mother     Heart Disease Brother     Cancer Brother         PROSTATE    Cancer Father         LUNG--ASBESTOS EXPOSURE    Heart Disease Maternal Aunt     Heart Disease Brother     Heart Disease Brother     No Known Problems Daughter     No Known Problems Daughter     Anesth Problems Neg Hx         SOCIAL HISTORY:  Patient resides at Home. Patient ambulates with walker, and wheelchair  Smoking history: none  Alcohol history: none    Objective:       Physical Exam:   Visit Vitals  /79   Pulse (!) 109   Temp 98.2 °F (36.8 °C)   Resp 18   SpO2 93%     General:  Alert, cooperative, no distress, appears stated age. Head:  Normocephalic, without obvious abnormality, atraumatic.    Eyes:  Conjunctivae/corneas clear.  EOMs intact. Nose: Nares normal. Septum midline. Throat: Lips, mucosa, and tongue normal.   Neck: Supple, symmetrical, trachea midline   Back:   Symmetric, no curvature. ROM normal.  R YOLANDA drain with serosanguinous fluid   Lungs:   Clear to auscultation bilaterally. Chest wall:  No tenderness or deformity. Heart:  Regular rate and rhythm, S1, S2 normal, no murmur, click, rub or gallop. Abdomen:   Soft, non-tender. Bowel sounds normal. No masses,  No organomegaly. Extremities: Extremities normal, atraumatic, no cyanosis or edema. Skin: Skin color, texture, turgor normal. No rashes or lesions. ECG:  NSR LAD, LBBB    Data Review: All diagnostic labs and studies have been reviewed. Assessment:     Active Problems:    * No active hospital problems.  *      Plan:     #Pulmonary Emboli  -Hemodynamically stable, no oxygen requirement, no right heart strain  -Full dose Lovenox    #Sepsis  -after admission, patient developed tachycardia and Tmax 100.8  -sepsis w/u initiated with bcx/ua/ucx/cxr, lactate, sepsis fluids, and abx with vanc, levaquin, and tobramycin 2/2 anaphylaxis with cephalosporin, and rash with pcn    #Hx R breast cancer   #Metastatic melanoma  -consult Dr. Antoinette Caro    #HTN  -continue hctz, cardura and metoprolol    #Dyslipidemia  -continue pravastatin and zetia    FEN: cardiac  dvt ppx: full dose lovenox  MPOA: Lilliam Peterson  Code: Full    FUNCTIONAL STATUS PRIOR TO HOSPITALIZATION Ambulatory with Use of Assistive Devices     Signed By: Nemo Soler MD     July 28, 2021

## 2021-07-30 NOTE — PROGRESS NOTES
Problem: Mobility Impaired (Adult and Pediatric)  Goal: *Acute Goals and Plan of Care (Insert Text)  Description: FUNCTIONAL STATUS PRIOR TO ADMISSION: Patient was modified independent using a walker for functional mobility. HOME SUPPORT PRIOR TO ADMISSION: The patient lived with spouse but did not require assist.    Physical Therapy Goals  Initiated 7/30/2021  1. Patient will move from supine to sit and sit to supine  and roll side to side in bed with minimal assistance/contact guard assist within 7 day(s). 2.  Patient will transfer from bed to chair and chair to bed with minimal assistance  using the least restrictive device within 7 day(s). 3.  Patient will perform sit to stand with minimal assistance  within 7 day(s). 4.  Patient will ambulate with minimal assistance/contact guard assist for 75 feet with the least restrictive device within 7 day(s). 5.  Patient will ascend/descend 3 stairs with 1 handrail(s) with minimal assistance/contact guard assist within 7 day(s). Outcome: Progressing Towards Goal   PHYSICAL THERAPY EVALUATION  Patient: Moncho Tiwari (39 y.o. female)  Date: 7/30/2021  Primary Diagnosis: Pulmonary emboli (HCC) [I26.99]        Precautions:   Fall      ASSESSMENT  Based on the objective data described below, the patient presents with decreased activity tolerance due to severe pain, 10/10, left sided upper back pain, elevated BP, tachycardic with minimal exertion, decreased strength, ROM RUE, balance, and impaired functional mobility well below her baseline. Pt states she ambulates with a walker at baseline. Pt required some encouragement to participate and agreeable. She required 2 person assistance to transfer supine to sit with initial posterior lean. Her balance improved however required occasional assistance. Pt stood with walker with strong posterior lean, side stepped toward [facility] and assisted back to bed with pt stating she feels \"faint\".   Her activity was limited due to severe pain, fatigue, and HR as high as 126 following standing briefly. Current Level of Function Impacting Discharge (mobility/balance): maximum assistance x 2 for bed mobility and sit to stand and side stepping with rolling walker and moderate x 2    Functional Outcome Measure: The patient scored Total Score: 4/28 on the Tinetti outcome measure which is indicative of high fall risk. Other factors to consider for discharge: well below baseline, high fall risk, recent surgery with readmission for PE, PMH     Patient will benefit from skilled therapy intervention to address the above noted impairments. PLAN :  Recommendations and Planned Interventions: bed mobility training, transfer training, gait training, and patient and family training/education      Frequency/Duration: Patient will be followed by physical therapy:  5 times a week to address goals. Recommendation for discharge: (in order for the patient to meet his/her long term goals)  Therapy up to 5 days/week in SNF setting pending progress    This discharge recommendation:  A follow-up discussion with the attending provider and/or case management is planned    IF patient discharges home will need the following DME: rolling walker, reports she owns a standard walker without wheels         SUBJECTIVE:   Patient stated I can tell you now that I am not going to be able to stand up.     OBJECTIVE DATA SUMMARY:   HISTORY:    Past Medical History:   Diagnosis Date    Arrhythmia     LBBB    Arthritis     SPINE    Axillary adenopathy 3/24/2021    Calculus of kidney 1990    Cancer ([facility] [address]     right breast X2 ; BCCA    Chronic pain     COVID-19 vaccine series completed     MODERNA3/31/21,4/28/21    Environmental allergies     GERD (gastroesophageal reflux disease)     Hypercholesterolemia     Hypertension     Lymphadenopathy     Nausea & vomiting     Other ill-defined conditions(799.89)     high cholesterol    Other ill-defined conditions(799.89)     NO BP/VENIPUNCTURES RIGHT ARM (POST LYMPH NODE DISSECTION)    Other ill-defined conditions(799.89)     MITRAL VALVE PROLAPSE    Pleuritic chest pain, left 7/28/2021    Recurrent malignant melanoma of skin ([facility] [address] 9/23/2020    Stroke ([facility] [address]     TIA 2009  (TAKING ASA)     Past Surgical History:   Procedure Laterality Date    HX BACK SURGERY  1998    l4-l5  ([facility])    HX BREAST LUMPECTOMY  1999    right    HX BREAST RECONSTRUCTION Right 8/27/2014    Revision Right Reconstructed Breast performed by Remington Palacios MD at [address] [place] [address] Bilateral 2/20/2015    REVISION RIGHT RECONSTRUCTED BREAST/REMOVE TISSUE EXPANDERS/PLACE IMPLANT RIGHT/MASTOPEXY LEFT performed by Remington Palacios MD at [place] [address]    HX BREAST RECONSTRUCTION  07/16/2021    Right axillary lymph node dissection and excision of right breast implant.     HX CATARACT REMOVAL      BILATERAL    HX DILATION AND CURETTAGE      HX GI  2006    COLONOSCOPY    HX HEENT      BCCA REMOVED FROM FOREHEAD    HX LITHOTRIPSY  1991    HX MALIGNANT SKIN LESION EXCISION  10/12/2020    Wide excision of recurrent melanoma of the back    HX MASTECTOMY Right 2014    HX ORTHOPAEDIC  2005    left bunionectomy    HX OTHER SURGICAL      BCCA REMOVED FROM BACK     HX MINAL AND BSO      HX TUBAL LIGATION      HX UROLOGICAL      kidney stone    HX UROLOGICAL  2016    BLADDER SLING       Personal factors and/or comorbidities impacting plan of care: recent surgery, PMH, fall risk    Home Situation  Home Environment: Private residence  # Steps to Enter: 3  Rails to Enter: Yes  Hand Rails : Bilateral  One/Two Story Residence: One story  Living Alone: No  Support Systems: Spouse/Significant Other/Partner, Family member(s)  Patient Expects to be Discharged to[de-identified] House  Current DME Used/Available at Home: Walker, rolling, Commode, bedside, Safety frame toliet, Shower chair  Tub or Shower Type: Shower    EXAMINATION/PRESENTATION/DECISION MAKING:   Critical Behavior:     Orientation Level: Oriented X4  Cognition: Follows commands, Appropriate for age attention/concentration  Safety/Judgement: Lack of insight into deficits       Skin:  dressing intact R under arm with drain     Range Of Motion:  AROM: Generally decreased, functional, RUE limited due to recent surgery and pain with minimal movement                       Strength:    Strength: Generally decreased, functional, RUE limited due to recent surgery and pain with minimal movement                    Tone & Sensation:   Tone: Normal              Sensation: Intact               Coordination:  Coordination: Within functional limits       Functional Mobility:  Bed Mobility:     Supine to Sit: Maximum assistance;Assist x2; Additional time; Adaptive equipment  Sit to Supine: Maximum assistance; Adaptive equipment; Additional time;Assist x2  Scooting: Maximum assistance;Assist x2  Transfers:  Sit to Stand: Moderate assistance;Assist x2; Additional time, side stepped to [facility] with rolling walker and moderate assist x 2  Stand to Sit: Minimum assistance;Assist x2; Additional time                       Balance:   Sitting: Impaired  Sitting - Static: Fair (occasional)  Sitting - Dynamic: Poor (constant support)  Standing: Impaired  Standing - Static: Constant support;Poor  Standing - Dynamic : Poor;Constant support    Functional Measure:  Tinetti test:    Sitting Balance: 1  Arises: 0  Attempts to Rise: 0  Immediate Standing Balance: 1  Standing Balance: 1  Nudged: 0  Eyes Closed: 0  Turn 360 Degrees - Continuous/Discontinuous: 0  Turn 360 Degrees - Steady/Unsteady: 0  Sitting Down: 1  Balance Score: 4 Balance total score  Indication of Gait: 0  R Step Length/Height: 0  L Step Length/Height: 0  R Foot Clearance: 0  L Foot Clearance: 0  Step Symmetry: 0  Step Continuity: 0  Path: 0  Trunk: 0  Walking Time: 0  Gait Score: 0 Gait total score  Total Score: 4/28 Overall total score         Tinetti Tool Score Risk of Falls  <19 = High Fall Risk  19-24 = Moderate Fall Risk  25-28 = Low Fall Risk  Tinetti ME. Performance-Oriented Assessment of Mobility Problems in Elderly Patients. [facility] 66; [id number]. (Scoring Description: PT Bulletin Feb. 10, 1993)    Older adults: Alla Melendez et al, 2009; n = 1000 [facility] elderly evaluated with ABC, BREN, ADL, and IADL)  · Mean BREN score for males aged 69-68 years = 26.21(3.40)  · Mean BREN score for females age 69-68 years = 25.16(4.30)  · Mean BREN score for males over 80 years = 23.29(6.02)  · Mean BREN score for females over 80 years = 17.20(8.32)        Physical Therapy Evaluation Charge Determination   History Examination Presentation Decision-Making   HIGH Complexity :3+ comorbidities / personal factors will impact the outcome/ POC  MEDIUM Complexity : 3 Standardized tests and measures addressing body structure, function, activity limitation and / or participation in recreation  MEDIUM Complexity : Evolving with changing characteristics  MEDIUM Complexity : FOTO score of 26-74      Based on the above components, the patient evaluation is determined to be of the following complexity level: MEDIUM    Pain Rating:  Verbal: 10  Location: left sided posterior upper back    Activity Tolerance:   Poor,severe pain, elevated BP, HR, fatigued quickly   Vitals:      07/30/21 1030 07/30/21 1036   BP:   122/76 (!) 150/78   BP 1 Location:   Left upper arm Left upper arm   BP Patient Position:   Supine Sitting   Pulse:   96 (!) 111   Temp:       Resp:       Height:       Weight:       SpO2:   94% 95%        After treatment patient left in no apparent distress:   Supine in bed and Call bell within reach    COMMUNICATION/EDUCATION:   The patients plan of care was discussed with: Registered nurse. Fall prevention education was provided and the patient/caregiver indicated understanding.  and Patient/family agree to work toward stated goals and plan of care.    Thank you for this referral.  Janet Albrecht   Time Calculation: 30 mins

## 2021-07-30 NOTE — TELEPHONE ENCOUNTER
Pt's  called and is concerned about his wife's status in the hospital right now. He is asking for a call back from Dr. Maxine Cage to help explain.

## 2021-07-30 NOTE — PROGRESS NOTES
Problem: Falls - Risk of  Goal: *Absence of Falls  Description: Document Damon Joshi Fall Risk and appropriate interventions in the flowsheet. Outcome: Progressing Towards Goal  Note: Fall Risk Interventions:  Mobility Interventions: Communicate number of staff needed for ambulation/transfer         Medication Interventions: Bed/chair exit alarm, Patient to call before getting OOB    Elimination Interventions: Call light in reach              Problem: Pressure Injury - Risk of  Goal: *Prevention of pressure injury  Description: Document Anmol Scale and appropriate interventions in the flowsheet.   Outcome: Progressing Towards Goal  Note: Pressure Injury Interventions:       Moisture Interventions: Check for incontinence Q2 hours and as needed    Activity Interventions: PT/OT evaluation    Mobility Interventions: PT/OT evaluation    Nutrition Interventions: Offer support with meals,snacks and hydration    Friction and Shear Interventions: Lift team/patient mobility team

## 2021-07-30 NOTE — PROGRESS NOTES
[facility] Adult  Hospitalist Group                                                                                          Hospitalist Progress Note  Chandra Shone, MD  Answering service: [phone] OR [phone] from in house phone        Date of Service:  2021  NAME:  Sandra Johansen  :  1940  MRN:  [id number]    This documentation was facilitated by a Voice Recognition software and may contain inadvertent typographical errors. Admission Summary:   Sandra Johansen is a 78 y.o. female with right breast cancer s/p R axillary lymph node dissection and excision of right breast implant for adenopathy and mass on  with path + for metastatic melanoma, GERD, dyslipidemia, HTN, TIA and hx malignant melanoma who presents with dyspnea and pleuritic chest pain for the past several days. Interval history / Subjective:   No acute overnight events. Complains of nausea today. Assessment & Plan:   Nausea and vomiting  --Antiemetics,antiacids. --Low threshold to image abdomen if symptoms persist.    Acute pulmonary emboli  --CT of the lung showed small burden of bilateral acute versus subacute pleural embolism, new multiple subcentimeter trach right lung nodules which may represent metastatic disease.  -Hemodynamically stable, no oxygen requirement, no right heart strain  -Continue Lovenox 1 mg/kg every 12 hourly. Lovenox is preferred anticoagulants in the setting of cancer (metastatic melanoma) however patient is not inclined to continue with injections, so patient will probably for be discharged on NOACs. -- Hematology/oncology consulted, recommended Xarlto on discharge. SIRS (fever and tachycardia) possible etiologies include acute PE, hypovolemia. No definitive source of infection.  -after admission, patient developed tachycardia and Tmax 100.8,no recurrence   -Procalcitonin level <0.05. Lactic acid 0.9. Blood culture in process,negative thus far. UA ,negative. Would like to obtain culture from right axillary drain.  -Patient was started on empiric antibiotics with vancomycin, Levaquin and tobramycin. DC tobramycin, continue with vancomycin Levaquin for now pending ongoing sepsis work-up. #Hx R breast cancer   #Metastatic melanoma  -Outpatient follow up ,may need CT PET scan     #HTN  -continue hctz, cardura and metoprolol     #Dyslipidemia  -continue pravastatin and zetia    #Hyponatremia likely due to hypovolemia: Improved with IV fluids. DVT prophylaxis: Full dose Lovenox  MPOA: Sebsatián Locke,   Code: Full     FUNCTIONAL STATUS PRIOR TO HOSPITALIZATION Ambulatory with Use of Assistive Devices     Hospital Problems  Date Reviewed: 7/28/2021        Codes Class Noted POA    Pulmonary emboli [facility]) ICD-10-CM: I26.99  ICD-9-CM: 415.19  7/28/2021 Unknown                Review of Systems:   A comprehensive review of systems was negative except for that written in the HPI. Vital Signs:    Last 24hrs VS reviewed since prior progress note. Most recent are:  Visit Vitals  /69   Pulse 69   Temp 98.5 °F (36.9 °C)   Resp 18   Ht 5' 3\" (1.6 m)   Wt 59.8 kg (131 lb 13.4 oz)   SpO2 94%   BMI 23.35 kg/m²         Intake/Output Summary (Last 24 hours) at 7/30/2021 1707  Last data filed at 7/30/2021 0739  Gross per 24 hour   Intake    Output 40 ml   Net -40 ml        Physical Examination:     I had a face to face encounter with this patient and independently examined them on7/30/2021 as outlined below:        Constitutional:  Alert and oriented x3. Not in distress. HEENT:  Atraumatic. Oral mucosa moist,. Non icteric sclera. No pallor. Resp:  CTA bilaterally. No wheezing/rhonchi/rales. No accessory muscle use   Chest Wall:  Right axillary drain in place draining serosanguineous fluid. CV:  Regular rhythm, normal rate, no murmurs, gallops, rubs    GI:  Soft, non distended, non tender.  normoactive bowel sounds, no hepatosplenomegaly    :  No CVA or suprapubic tenderness    Musculoskeletal:  Trace peripheral edema, warm, 2+ pulses throughout    Neurologic:  Mental status:AAOx3,   Cranial nerves II-XII : WNL  Motor exam:Moves all extremities symmetrically              Data Review:    Review and/or order of clinical lab test  Review and/or order of tests in the radiology section of [facility]  Review and/or order of tests in the medicine section of [facility]      Labs:     Recent Labs     07/29/21  0133 07/28/21  1639   WBC 5.9 5.9   HGB 9.1* 9.8*   HCT 27.4* 29.8*   * 463*     Recent Labs     07/29/21  0133 07/28/21  1639   * 131*   K 4.1 4.7    101   CO2 26 25   BUN 11 15   CREA 0.62 0.81   * 112*   CA 9.0 9.8     Recent Labs     07/28/21  1639   ALT 30   AP 73   TBILI 0.3   TP 7.6   ALB 2.7*   GLOB 4.9*     No results for input(s): INR, PTP, APTT, INREXT, INREXT in the last 72 hours. No results for input(s): FE, TIBC, PSAT, FERR in the last 72 hours. Lab Results   Component Value Date/Time    Folate >24.0 (H) 01/09/2010 03:15 AM      No results for input(s): PH, PCO2, PO2 in the last 72 hours.   Recent Labs     07/28/21  1639   TROIQ <0.05     Lab Results   Component Value Date/Time    Cholesterol, total 150 05/07/2010 07:39 AM    HDL Cholesterol 41 05/07/2010 07:39 AM    LDL, calculated 72.2 05/07/2010 07:39 AM    Triglyceride 184 05/07/2010 07:39 AM    CHOL/HDL Ratio 3.7 05/07/2010 07:39 AM     Lab Results   Component Value Date/Time    Glucose (POC) 158 (H) 05/15/2021 09:50 PM    Glucose (POC) 109 (H) 03/10/2021 02:12 PM     Lab Results   Component Value Date/Time    Color YELLOW/STRAW 07/29/2021 04:44 PM    Appearance CLEAR 07/29/2021 04:44 PM    Specific gravity 1.021 07/29/2021 04:44 PM    Specific gravity <1.005 01/21/2011 04:45 AM    pH (UA) 5.5 07/29/2021 04:44 PM    Protein Negative 07/29/2021 04:44 PM    Glucose Negative 07/29/2021 04:44 PM    Ketone Negative 07/29/2021 04:44 PM    Bilirubin Negative 07/29/2021 04:44 PM    Urobilinogen 0.2 07/29/2021 04:44 PM    Nitrites Negative 07/29/2021 04:44 PM    Leukocyte Esterase Negative 07/29/2021 04:44 PM    Epithelial cells FEW 07/29/2021 04:44 PM    Bacteria Negative 07/29/2021 04:44 PM    WBC 0-4 07/29/2021 04:44 PM    RBC 0-5 07/29/2021 04:44 PM         Medications Reviewed:     Current Facility-Administered Medications   Medication Dose Route Frequency    [START ON 7/31/2021] Vancomycin random 7/31 with AM labs   Other ONCE    ondansetron (ZOFRAN) injection 2 mg  2 mg IntraVENous Q4H PRN    pantoprazole (PROTONIX) tablet 40 mg  40 mg Oral ACB    calcium carbonate (TUMS) chewable tablet 200 mg [elemental]  200 mg Oral TID WITH MEALS    sodium chloride (NS) flush 5-10 mL  5-10 mL IntraVENous PRN    levoFLOXacin (LEVAQUIN) 750 mg in D5W IVPB  750 mg IntraVENous Q24H    Vancomycin dosing per pharmacy   Other Rx Dosing/Monitoring    vancomycin (VANCOCIN) 1250 mg in  ml infusion  1,250 mg IntraVENous Q24H    enoxaparin (LOVENOX) injection 60 mg  1 mg/kg SubCUTAneous Q12H    oxyCODONE-acetaminophen (PERCOCET) 5-325 mg per tablet 1 Tablet  1 Tablet Oral Q4H PRN    acetaminophen (TYLENOL) tablet 650 mg  650 mg Oral Q6H PRN    Or    acetaminophen (TYLENOL) suppository 650 mg  650 mg Rectal Q6H PRN    ezetimibe (ZETIA) tablet 10 mg  10 mg Oral DAILY    doxazosin (CARDURA) tablet 2 mg  2 mg Oral DAILY    hydroCHLOROthiazide (HYDRODIURIL) tablet 25 mg  25 mg Oral DAILY    metoprolol tartrate (LOPRESSOR) tablet 50 mg  50 mg Oral BID    aspirin delayed-release tablet 81 mg  81 mg Oral DAILY    pravastatin (PRAVACHOL) tablet 20 mg  20 mg Oral QHS     ______________________________________________________________________  EXPECTED LENGTH OF STAY: 4d 19h  ACTUAL LENGTH OF STAY:          2                 Israel Cr MD

## 2021-07-30 NOTE — PROGRESS NOTES
Transition of Care Plan   RUR:  13% low   Disposition: The disposition plan is home with  [address] F/U with PCP/Specialist     Transport:     Reason for Admission:  Pulmonary embolism                      RUR Score:      13% low               Plan for utilizing home health:    [address] [place] [address]       PCP: First and Last name:  Waleska Del Rosario MD     Name of Practice:    Are you a current patient: Yes/No:    Approximate date of last visit:    Can you participate in a virtual visit with your PCP:                     Current Advanced Directive/Advance Care Plan: Full Code      Healthcare Decision Maker:             Primary Decision Maker: Sixto Ellington - Spouse - [phone]                  Transition of Care Plan:                        CRM met with patient and patient's  bedside, introduced self, explained role, verified demographics, and offered assistance. Patient lives with her  in a home that has 6 steps to access via garage. The patient was A&OX4 and has a transporter, walker and bedside commode. The patient requires assistance with her ADL's/IADL's and her  provides that for her. The patient gets her prescriptions filled at [place] and has no barriers to obtaining them. [facility] accepted; placed on AVS.    Care Management Interventions  PCP Verified by CM: Yes  Palliative Care Criteria Met (RRAT>21 & CHF Dx)?: No  Mode of Transport at Discharge:  Other (see comment) ()  Transition of Care Consult (CM Consult): Discharge Planning  MyChart Signup: No  Discharge Durable Medical Equipment: No  Health Maintenance Reviewed: Yes  Physical Therapy Consult: Yes  Occupational Therapy Consult: No  Speech Therapy Consult: No  Current Support Network: Lives with Spouse  Confirm Follow Up Transport: Family  The Patient and/or Patient Representative was Provided with a Choice of Provider and Agrees with the Discharge Plan?: Yes  Freedom of Choice List was Provided with Basic Dialogue that Supports the Patient's Individualized Plan of Care/Goals, Treatment Preferences and Shares the Quality Data Associated with the Providers?: Yes  The [employer] Information Provided?: No  Discharge Location  Discharge Placement: Home with home health    Donney Schirmer, [address]

## 2021-07-31 NOTE — PROGRESS NOTES
Physician Progress Note      Michelle Higuera  CSN #:                  [id number]  :                       1940  ADMIT DATE:       2021 4:46 PM  [address] DATE:  RESPONDING  PROVIDER #:        Donis Adams MD          QUERY TEXT:    Dear attending,  Patient admitted with back pain s/p R axillary lymph node dissection and excision of right breast implant for adenopathy and mass on  ,and was found to have a pulmonary embolus. If possible, please document in progress notes and discharge summary:      The medical record reflects the following:  Risk Factors: s/p RIGHT axillary LN dissection on 21. Clinical Indicators: CTA chest- IMPRESSION  1. Small burden of bilateral acute versus subacute pulmonary embolism. 2. New multiple subcentimeter right lung nodules may represent metastatic  Disease  Per H&P- #Pulmonary Emboli  -Hemodynamically stable, no oxygen requirement, no right heart strain  -Full dose Lovenox  Treatment: Monitor labwork, vital signs, imaging, Lovenox 60 mg q12 hours subq    Thank you    Lexus Esquivel RN, BSN  Clinical documentation Improvement  [phone]  Options provided:  -- Pulmonary embolus as a postoperative complication  -- Pulmonary embolus related to other incidental risk factor (please specify) occurring following surgery and not a complication, Please document incidental risk factor. -- Other - I will add my own diagnosis  -- Disagree - Not applicable / Not valid  -- Disagree - Clinically unable to determine / Unknown  -- Refer to Clinical Documentation Reviewer    PROVIDER RESPONSE TEXT:    Pulmonary embolus is related to other incidental risk factor occurring following surgery and not a complication. Unable to determine causal relationship with surgery.  Patient's risk factor includes metastatic cancer, decreased mobility    Query created by: Lety Larsen on 2021 11:42 AM      Electronically signed by:  Donis Adams MD 2021 4:49 PM

## 2021-07-31 NOTE — PROGRESS NOTES
Pharmacist Note - Vancomycin Dosing  Therapy day 3  Indication: Sepsis  Current regimen: 1250 mg Q24    Recent Labs     07/31/21  0057 07/29/21  0133 07/28/21  1639   WBC 4.5 5.9 5.9   CREA 0.62 0.62 0.81   BUN 10 11 15     A random vancomycin level of 15.8 mcg/mL was obtained and from this level, the patient's AUC24 is calculated to be 480 with the current regimen. Goal target range AUC/MARIA -600      Plan: Continue current regimen. Pharmacy will continue to monitor this patient daily for changes in clinical status and renal function. *Random vancomycin levels are used to calculate AUC/MARIA LUZ, this level should not be interpreted as a trough. Vancomycin has been dosed using Bayesian kinetics software to target an AUC24:MARIA LUZ of 400-600, which provides adequate exposure for as assumed infection due to MRSA with an MARIA LUZ of 1 or less while reducing the risk of nephrotoxicity as seen with traditional trough based dosing goals.

## 2021-07-31 NOTE — PROGRESS NOTES
[address] [facility] Adult  Hospitalist Group                                                                                          Hospitalist Progress Note  Cherylene Majors, MD  Answering service: [phone] OR [phone] from in house phone        Date of Service:  2021  NAME:  Dionicio Wheeler  :  1940  MRN:  [id number]    This documentation was facilitated by a Voice Recognition software and may contain inadvertent typographical errors. Admission Summary:   Dionicio Wheeler is a 78 y.o. female with right breast cancer s/p R axillary lymph node dissection and excision of right breast implant for adenopathy and mass on  with path + for metastatic melanoma, GERD, dyslipidemia, HTN, TIA and hx malignant melanoma who presents with dyspnea and pleuritic chest pain for the past several days. Interval history / Subjective:   No acute overnight events. She still has some nausea. Assessment & Plan:   Nausea and vomiting  --Antiemetics,antiacids. --We will obtain CT A/P to rule out any acute intra-abdominal process. Acute pulmonary emboli  --CT of the lung showed small burden of bilateral acute versus subacute pleural embolism, new multiple subcentimeter trach right lung nodules which may represent metastatic disease.  -Hemodynamically stable, no oxygen requirement, no right heart strain  -Continue Lovenox 1 mg/kg every 12 hourly. Lovenox is preferred anticoagulants in the setting of cancer (metastatic melanoma) however patient is not inclined to continue with injections, so patient will probably for be discharged on NOACs. -- Hematology/oncology consulted, recommended Xarlto on discharge. SIRS (fever and tachycardia) possible etiologies include acute PE, hypovolemia. No definitive source of infection. Fever did not recur since .  -after admission, patient developed tachycardia and Tmax 100.8,no recurrence   -Procalcitonin level <0.05. Lactic acid 0.9.   Blood culture in process,negative thus far. UA ,negative. Would like to obtain culture from right axillary drain.  -Patient was started on empiric antibiotics with vancomycin, Levaquin and tobramycin. DC tobramycin, continue with vancomycin Levaquin. Infection is looking less likely and if the CT A/P is negative, will discontinue the antibiotics and watch. #Hx R breast cancer   #Metastatic melanoma  -Outpatient follow up ,may need CT PET scan     #HTN  -continue hctz, cardura and metoprolol     #Dyslipidemia  -continue pravastatin and zetia    #Hyponatremia likely due to hypovolemia: Improved with IV fluids. DVT prophylaxis: Full dose Lovenox  MPOA: Rose Marie Gandhi,   Code: Full     FUNCTIONAL STATUS PRIOR TO HOSPITALIZATION Ambulatory with Use of Assistive Devices     Hospital Problems  Date Reviewed: 7/28/2021        Codes Class Noted POA    Pulmonary emboli [facility]) ICD-10-CM: I26.99  ICD-9-CM: 415.19  7/28/2021 Unknown                Review of Systems:   A comprehensive review of systems was negative except for that written in the HPI. Vital Signs:    Last 24hrs VS reviewed since prior progress note. Most recent are:  Visit Vitals  BP (!) 159/81 (BP 1 Location: Left upper arm, BP Patient Position: At rest)   Pulse 78   Temp 98.2 °F (36.8 °C)   Resp 18   Ht 5' 3\" (1.6 m)   Wt 64.6 kg (142 lb 6.7 oz)   SpO2 93%   BMI 25.23 kg/m²         Intake/Output Summary (Last 24 hours) at 7/31/2021 1031  Last data filed at 7/31/2021 0744  Gross per 24 hour   Intake 670 ml   Output 25 ml   Net 645 ml        Physical Examination:     I had a face to face encounter with this patient and independently examined them on7/31/2021 as outlined below:        Constitutional:  Alert and oriented x3. Not in distress. HEENT:  Atraumatic. Oral mucosa moist,. Non icteric sclera. No pallor. Resp:  CTA bilaterally. No wheezing/rhonchi/rales.  No accessory muscle use   Chest Wall:  Right axillary drain in place draining serosanguineous fluid.   CV:  Regular rhythm, normal rate, no murmurs, gallops, rubs    GI:  Soft, non distended, non tender. normoactive bowel sounds, no hepatosplenomegaly    :  No CVA or suprapubic tenderness    Musculoskeletal:  Trace peripheral edema, warm, 2+ pulses throughout    Neurologic:  Mental status:AAOx3,   Cranial nerves II-XII : WNL  Motor exam:Moves all extremities symmetrically              Data Review:    Review and/or order of clinical lab test  Review and/or order of tests in the radiology section of CPT  Review and/or order of tests in the medicine section of CPT      Labs:     Recent Labs     07/31/21 0057 07/29/21 0133   WBC 4.5 5.9   HGB 8.8* 9.1*   HCT 27.6* 27.4*   * 446*     Recent Labs     07/31/21 0057 07/29/21 0133 07/28/21  1639   * 134* 131*   K 4.0 4.1 4.7    101 101   CO2 26 26 25   BUN 10 11 15   CREA 0.62 0.62 0.81   * 105* 112*   CA 9.0 9.0 9.8     Recent Labs     07/28/21  1639   ALT 30   AP 73   TBILI 0.3   TP 7.6   ALB 2.7*   GLOB 4.9*     No results for input(s): INR, PTP, APTT, INREXT, INREXT in the last 72 hours. No results for input(s): FE, TIBC, PSAT, FERR in the last 72 hours. Lab Results   Component Value Date/Time    Folate >24.0 (H) 01/09/2010 03:15 AM      No results for input(s): PH, PCO2, PO2 in the last 72 hours.   Recent Labs     07/28/21  1639   TROIQ <0.05     Lab Results   Component Value Date/Time    Cholesterol, total 150 05/07/2010 07:39 AM    HDL Cholesterol 41 05/07/2010 07:39 AM    LDL, calculated 72.2 05/07/2010 07:39 AM    Triglyceride 184 05/07/2010 07:39 AM    CHOL/HDL Ratio 3.7 05/07/2010 07:39 AM     Lab Results   Component Value Date/Time    Glucose (POC) 158 (H) 05/15/2021 09:50 PM    Glucose (POC) 109 (H) 03/10/2021 02:12 PM     Lab Results   Component Value Date/Time    Color YELLOW/STRAW 07/29/2021 04:44 PM    Appearance CLEAR 07/29/2021 04:44 PM    Specific gravity 1.021 07/29/2021 04:44 PM    Specific gravity <1.005 01/21/2011 04:45 AM    pH (UA) 5.5 07/29/2021 04:44 PM    Protein Negative 07/29/2021 04:44 PM    Glucose Negative 07/29/2021 04:44 PM    Ketone Negative 07/29/2021 04:44 PM    Bilirubin Negative 07/29/2021 04:44 PM    Urobilinogen 0.2 07/29/2021 04:44 PM    Nitrites Negative 07/29/2021 04:44 PM    Leukocyte Esterase Negative 07/29/2021 04:44 PM    Epithelial cells FEW 07/29/2021 04:44 PM    Bacteria Negative 07/29/2021 04:44 PM    WBC 0-4 07/29/2021 04:44 PM    RBC 0-5 07/29/2021 04:44 PM         Medications Reviewed:     Current Facility-Administered Medications   Medication Dose Route Frequency    ondansetron (ZOFRAN) injection 2 mg  2 mg IntraVENous Q4H PRN    pantoprazole (PROTONIX) tablet 40 mg  40 mg Oral ACB    calcium carbonate (TUMS) chewable tablet 200 mg [elemental]  200 mg Oral TID WITH MEALS    sodium chloride (NS) flush 5-10 mL  5-10 mL IntraVENous PRN    levoFLOXacin (LEVAQUIN) 750 mg in D5W IVPB  750 mg IntraVENous Q24H    Vancomycin dosing per pharmacy   Other Rx Dosing/Monitoring    vancomycin (VANCOCIN) 1250 mg in  ml infusion  1,250 mg IntraVENous Q24H    enoxaparin (LOVENOX) injection 60 mg  1 mg/kg SubCUTAneous Q12H    oxyCODONE-acetaminophen (PERCOCET) 5-325 mg per tablet 1 Tablet  1 Tablet Oral Q4H PRN    acetaminophen (TYLENOL) tablet 650 mg  650 mg Oral Q6H PRN    Or    acetaminophen (TYLENOL) suppository 650 mg  650 mg Rectal Q6H PRN    ezetimibe (ZETIA) tablet 10 mg  10 mg Oral DAILY    doxazosin (CARDURA) tablet 2 mg  2 mg Oral DAILY    hydroCHLOROthiazide (HYDRODIURIL) tablet 25 mg  25 mg Oral DAILY    metoprolol tartrate (LOPRESSOR) tablet 50 mg  50 mg Oral BID    aspirin delayed-release tablet 81 mg  81 mg Oral DAILY    pravastatin (PRAVACHOL) tablet 20 mg  20 mg Oral QHS     ______________________________________________________________________  EXPECTED LENGTH OF STAY: 4d 19h  ACTUAL LENGTH OF STAY:          3                 Сергей Thao MD

## 2021-07-31 NOTE — PROGRESS NOTES
Problem: Falls - Risk of  Goal: *Absence of Falls  Description: Document [place] Fall Risk and appropriate interventions in the flowsheet. Outcome: Progressing Towards Goal  Note: Fall Risk Interventions:  Mobility Interventions: Communicate number of staff needed for ambulation/transfer         Medication Interventions: Bed/chair exit alarm, Patient to call before getting OOB    Elimination Interventions: Call light in reach              Problem: Pressure Injury - Risk of  Goal: *Prevention of pressure injury  Description: Document Anmol Scale and appropriate interventions in the flowsheet.   Outcome: Progressing Towards Goal  Note: Pressure Injury Interventions:       Moisture Interventions: Check for incontinence Q2 hours and as needed    Activity Interventions: PT/OT evaluation    Mobility Interventions: PT/OT evaluation    Nutrition Interventions: Offer support with meals,snacks and hydration    Friction and Shear Interventions: Lift team/patient mobility team

## 2021-07-31 NOTE — PROGRESS NOTES
Problem: Falls - Risk of  Goal: *Absence of Falls  Description: Document Royer Vera Fall Risk and appropriate interventions in the flowsheet.   Outcome: Progressing Towards Goal  Note: Fall Risk Interventions:  Mobility Interventions: Bed/chair exit alarm         Medication Interventions: Bed/chair exit alarm, Patient to call before getting OOB    Elimination Interventions: Call light in reach

## 2021-08-01 NOTE — PROGRESS NOTES
Physician Progress Note      Agustin Randhawa  [facility] #:                  [id number]  :                       1940  ADMIT DATE:       2021 4:46 PM  [address] [place] DATE:  RESPONDING  PROVIDER #:        Gilford Gault MD          QUERY TEXT:    Dear attending,    Patient admitted with back pain s/p R axillary lymph node dissection and excision of right breast implant for adenopathy and mass on  ,and was found to have a pulmonary embolus. Noted documentation of sepsis in the H&P. If possible, please document in progress notes and discharge summary the source of sepsis:      The medical record reflects the following:  Risk Factors: s/p RIGHT axillary LN dissection on 21. Clinical Indicators: - WBC 5.9, CTA chest- IMPRESSION  1. Small burden of bilateral acute versus subacute pulmonary embolism. 2. New multiple subcentimeter right lung nodules may represent metastatic  disease. -WBC 5.9, lactic acid 0.9, CXR - IMPRESSION  No evidence of acute cardiopulmonary process. , 100.8  Per H&P- Sepsis  -after admission, patient developed tachycardia and Tmax 100.8  -sepsis w/u initiated with bcx/ua/ucx/cxr, lactate, sepsis fluids, and abx with vanc, levaquin, and tobramycin 2/2 anaphylaxis with cephalosporin, and rash with pcn  Treatment: Monitor labwork, vital signs, imaging, IV Levaquin 750 mg qd, IV vancocin per pharmacy    Thank you    Lalito Callahan RN, BSN  Clinical documentation Improvement  [phone]  Options provided:  -- Sepsis present as evidenced by, Please document evidence. -- Sepsis was ruled out after study  -- Other - I will add my own diagnosis  -- Disagree - Not applicable / Not valid  -- Disagree - Clinically unable to determine / Unknown  -- Refer to Clinical Documentation Reviewer    PROVIDER RESPONSE TEXT:    Sepsis was ruled out after study.     Query created by: Ashley Easton on 2021 11:23 AM      Electronically signed by:  Gilford Gault MD 2021 9: 27 AM

## 2021-08-01 NOTE — PROGRESS NOTES
[address] [facility] Adult  Hospitalist Group                                                                                          Hospitalist Progress Note  Mercedes Mckinney MD  Answering service: [phone] OR [phone] from in house phone        Date of Service:  2021  NAME:  Sherry Cox  :  1940  MRN:  [id number]    This documentation was facilitated by a Voice Recognition software and may contain inadvertent typographical errors. Admission Summary:   Sherry Cox is a 78 y.o. female with right breast cancer s/p R axillary lymph node dissection and excision of right breast implant for adenopathy and mass on  with path + for metastatic melanoma, GERD, dyslipidemia, HTN, TIA and hx malignant melanoma who presents with dyspnea and pleuritic chest pain for the past several days. Interval history / Subjective:   No acute overnight events. She still has some nausea. Assessment & Plan:   Nausea and vomiting  --Antiemetics,antiacids. --We will obtain CT A/P to rule out any acute intra-abdominal process. Acute pulmonary emboli  --CT of the lung showed small burden of bilateral acute versus subacute pleural embolism, new multiple subcentimeter trach right lung nodules which may represent metastatic disease.  -Hemodynamically stable, no oxygen requirement, no right heart strain  -Continue Lovenox 1 mg/kg every 12 hourly. Lovenox is preferred anticoagulants in the setting of cancer (metastatic melanoma) however patient is not inclined to continue with injections, so patient will probably for be discharged on NOACs. -- Hematology/oncology consulted, recommended Xarlto on discharge. SIRS (fever and tachycardia) possible etiologies include acute PE, hypovolemia. No definitive source of infection. Fever did not recur since . No infection/sepsis.   Antibiotics discontinued on   -after admission, patient developed tachycardia and Tmax 100.8,no recurrence -Procalcitonin level <0.05. Lactic acid 0.9. Blood culture in process,negative thus far. UA ,negative. Would like to obtain culture from right axillary drain.  -Patient was started on empiric antibiotics with vancomycin, Levaquin and tobramycin. DC tobramycin, continue with vancomycin Levaquin. Infection is looking less likely  --CT abdomen pelvis no intra-abdominal evidence of infection. #Hx R breast cancer   #Metastatic melanoma  -Outpatient follow up ,may need CT PET scan     #HTN  -continue hctz, cardura and metoprolol     #Dyslipidemia  -continue pravastatin and zetia    #Hyponatremia likely due to hypovolemia: Improved with IV fluids. DVT prophylaxis: Full dose Lovenox  MPOA: Aleksander Mdeeiros,   Code: Full  Disposition: She is very weak, she may need rehab. FUNCTIONAL STATUS PRIOR TO HOSPITALIZATION Ambulatory with Use of Assistive Devices     Hospital Problems  Date Reviewed: 7/28/2021        Codes Class Noted POA    Pulmonary emboli [facility]) ICD-10-CM: I26.99  ICD-9-CM: 415.19  7/28/2021 Unknown                Review of Systems:   A comprehensive review of systems was negative except for that written in the HPI. Vital Signs:    Last 24hrs VS reviewed since prior progress note. Most recent are:  Visit Vitals  BP 98/64 (BP 1 Location: Left lower arm, BP Patient Position: At rest)   Pulse 82   Temp 98.7 °F (37.1 °C)   Resp 18   Ht 5' 3\" (1.6 m)   Wt 64.6 kg (142 lb 6.7 oz)   SpO2 91%   BMI 25.23 kg/m²         Intake/Output Summary (Last 24 hours) at 8/1/2021 1821  Last data filed at 7/31/2021 2145  Gross per 24 hour   Intake 520 ml   Output 20 ml   Net 500 ml        Physical Examination:     I had a face to face encounter with this patient and independently examined them on8/1/2021 as outlined below:        Constitutional:  Alert and oriented x3. Not in distress. HEENT:  Atraumatic. Oral mucosa moist,. Non icteric sclera. No pallor. Resp:  CTA bilaterally. No wheezing/rhonchi/rales. No accessory muscle use   Chest Wall:  Right axillary drain in place draining serosanguineous fluid. CV:  Regular rhythm, normal rate, no murmurs, gallops, rubs    GI:  Soft, non distended, non tender. normoactive bowel sounds, no hepatosplenomegaly    :  No CVA or suprapubic tenderness    Musculoskeletal:  Trace peripheral edema, warm, 2+ pulses throughout    Neurologic:  Mental status:AAOx3,   Cranial nerves II-XII : WNL  Motor exam:Moves all extremities symmetrically              Data Review:    Review and/or order of clinical lab test  Review and/or order of tests in the radiology section of CPT  Review and/or order of tests in the medicine section of CPT      Labs:     Recent Labs     07/31/21 0057   WBC 4.5   HGB 8.8*   HCT 27.6*   *     Recent Labs     07/31/21 0057   *   K 4.0      CO2 26   BUN 10   CREA 0.62   *   CA 9.0     No results for input(s): ALT, AP, TBIL, TBILI, TP, ALB, GLOB, GGT, AML, LPSE in the last 72 hours. No lab exists for component: SGOT, GPT, AMYP, HLPSE  No results for input(s): INR, PTP, APTT, INREXT, INREXT in the last 72 hours. No results for input(s): FE, TIBC, PSAT, FERR in the last 72 hours. Lab Results   Component Value Date/Time    Folate >24.0 (H) 01/09/2010 03:15 AM      No results for input(s): PH, PCO2, PO2 in the last 72 hours. No results for input(s): CPK, CKNDX, TROIQ in the last 72 hours.     No lab exists for component: CPKMB  Lab Results   Component Value Date/Time    Cholesterol, total 150 05/07/2010 07:39 AM    HDL Cholesterol 41 05/07/2010 07:39 AM    LDL, calculated 72.2 05/07/2010 07:39 AM    Triglyceride 184 05/07/2010 07:39 AM    CHOL/HDL Ratio 3.7 05/07/2010 07:39 AM     Lab Results   Component Value Date/Time    Glucose (POC) 158 (H) 05/15/2021 09:50 PM    Glucose (POC) 109 (H) 03/10/2021 02:12 PM     Lab Results   Component Value Date/Time    Color YELLOW/STRAW 07/29/2021 04:44 PM    Appearance CLEAR 07/29/2021 04:44 PM Specific gravity 1.021 07/29/2021 04:44 PM    Specific gravity <1.005 01/21/2011 04:45 AM    pH (UA) 5.5 07/29/2021 04:44 PM    Protein Negative 07/29/2021 04:44 PM    Glucose Negative 07/29/2021 04:44 PM    Ketone Negative 07/29/2021 04:44 PM    Bilirubin Negative 07/29/2021 04:44 PM    Urobilinogen 0.2 07/29/2021 04:44 PM    Nitrites Negative 07/29/2021 04:44 PM    Leukocyte Esterase Negative 07/29/2021 04:44 PM    Epithelial cells FEW 07/29/2021 04:44 PM    Bacteria Negative 07/29/2021 04:44 PM    WBC 0-4 07/29/2021 04:44 PM    RBC 0-5 07/29/2021 04:44 PM         Medications Reviewed:     Current Facility-Administered Medications   Medication Dose Route Frequency    ondansetron (ZOFRAN) injection 2 mg  2 mg IntraVENous Q4H PRN    pantoprazole (PROTONIX) tablet 40 mg  40 mg Oral ACB    calcium carbonate (TUMS) chewable tablet 200 mg [elemental]  200 mg Oral TID WITH MEALS    sodium chloride (NS) flush 5-10 mL  5-10 mL IntraVENous PRN    enoxaparin (LOVENOX) injection 60 mg  1 mg/kg SubCUTAneous Q12H    oxyCODONE-acetaminophen (PERCOCET) 5-325 mg per tablet 1 Tablet  1 Tablet Oral Q4H PRN    acetaminophen (TYLENOL) tablet 650 mg  650 mg Oral Q6H PRN    Or    acetaminophen (TYLENOL) suppository 650 mg  650 mg Rectal Q6H PRN    ezetimibe (ZETIA) tablet 10 mg  10 mg Oral DAILY    doxazosin (CARDURA) tablet 2 mg  2 mg Oral DAILY    hydroCHLOROthiazide (HYDRODIURIL) tablet 25 mg  25 mg Oral DAILY    metoprolol tartrate (LOPRESSOR) tablet 50 mg  50 mg Oral BID    aspirin delayed-release tablet 81 mg  81 mg Oral DAILY    pravastatin (PRAVACHOL) tablet 20 mg  20 mg Oral QHS     ______________________________________________________________________  EXPECTED LENGTH OF STAY: 4d 19h  ACTUAL LENGTH OF STAY:          4                 Ronaldo Beyer MD

## 2021-08-01 NOTE — PROGRESS NOTES
Problem: Falls - Risk of  Goal: *Absence of Falls  Description: Document Kelly Ragland Fall Risk and appropriate interventions in the flowsheet. Outcome: Progressing Towards Goal  Note: Fall Risk Interventions:  Mobility Interventions: Bed/chair exit alarm         Medication Interventions: Bed/chair exit alarm, Patient to call before getting OOB    Elimination Interventions: Call light in reach              Problem: Pressure Injury - Risk of  Goal: *Prevention of pressure injury  Description: Document Anmol Scale and appropriate interventions in the flowsheet.   Outcome: Progressing Towards Goal  Note: Pressure Injury Interventions:       Moisture Interventions: Check for incontinence Q2 hours and as needed    Activity Interventions: PT/OT evaluation    Mobility Interventions: Assess need for specialty bed    Nutrition Interventions: Offer support with meals,snacks and hydration    Friction and Shear Interventions: Lift team/patient mobility team

## 2021-08-01 NOTE — PROGRESS NOTES
Orthopedic Spine Nursing Note      Patient Name: Moncho Tiwari    : 1940               MRN: [id number]    Admit Diagnosis: Pulmonary emboli ([facility] [address] [I26.99]    Surgery: * No surgery found *             Patient refused morning lab draws.       Lines:   Peripheral IV 21 Left Antecubital (Active)   Site Assessment Clean, dry, & intact 21   Phlebitis Assessment 0 21   Infiltration Assessment 0 21   Dressing Status Clean, dry, & intact 21   Dressing Type Transparent 21   Hub Color/Line Status Capped 21   Action Taken Open ports on tubing capped 21   Alcohol Cap Used Yes 21       Signed by: Leon Shepard RN, BSN, [address] [place] [address] Drive                                            2021 at 2:01 AM

## 2021-08-01 NOTE — PROGRESS NOTES
ANDRE:  1. RUR-14%  2. Return home with [address], information placed on AVS.  3. General Surgery following.        CM noted in [employer] that [facility] accepted pending orders, cm uploaded resumption of PT/OT orders and updated the referral.      Robbin Willis, [facility]

## 2021-08-01 NOTE — PROGRESS NOTES
Problem: Falls - Risk of  Goal: *Absence of Falls  Description: Document Sony Rai Fall Risk and appropriate interventions in the flowsheet. Outcome: Progressing Towards Goal  Note: Fall Risk Interventions:  Mobility Interventions: Communicate number of staff needed for ambulation/transfer         Medication Interventions: Evaluate medications/consider consulting pharmacy    Elimination Interventions: Call light in reach              Problem: Pressure Injury - Risk of  Goal: *Prevention of pressure injury  Description: Document Anmol Scale and appropriate interventions in the flowsheet.   Outcome: Progressing Towards Goal  Note: Pressure Injury Interventions:       Moisture Interventions: Check for incontinence Q2 hours and as needed    Activity Interventions: PT/OT evaluation    Mobility Interventions: Pressure redistribution bed/mattress (bed type)    Nutrition Interventions: Offer support with meals,snacks and hydration    Friction and Shear Interventions: Minimize layers                Problem: Discharge Planning  Goal: *Discharge to safe environment  Outcome: Progressing Towards Goal

## 2021-08-02 NOTE — PROGRESS NOTES
[address] [facility] Adult  Hospitalist Group                                                                                          Hospitalist Progress Note  Tracy Matamoros MD  Answering service: [phone] OR [phone] from in house phone        Date of Service:  2021  NAME:  Miriam Cuello  :  1940  MRN:  [id number]    This documentation was facilitated by a Voice Recognition software and may contain inadvertent typographical errors. Admission Summary:   Miriam Cuello is a 78 y.o. female with right breast cancer s/p R axillary lymph node dissection and excision of right breast implant for adenopathy and mass on  with path + for metastatic melanoma, GERD, dyslipidemia, HTN, TIA and hx malignant melanoma who presents with dyspnea and pleuritic chest pain for the past several days. Interval history / Subjective:   No acute overnight events. Patient has no complaints. Nausea and vomiting improved. A temperature of 101 recorded from this morning, patient denies fever or chills. No cough or chest pain. Denied diarrhea. Assessment & Plan:   Nausea and vomiting  --Antiemetics,antiacids. --We will obtain CT A/P to rule out any acute intra-abdominal process. Acute pulmonary emboli  --CT of the lung showed small burden of bilateral acute versus subacute pleural embolism, new multiple subcentimeter trach right lung nodules which may represent metastatic disease.  -Hemodynamically stable, no oxygen requirement, no right heart strain  -Continue Lovenox 1 mg/kg every 12 hourly. Lovenox is preferred anticoagulants in the setting of cancer (metastatic melanoma) however patient is not inclined to continue with injections, so patient will probably for be discharged on NOACs. -- Hematology/oncology consulted, recommended Xarlto on discharge. SIRS (fever and tachycardia) possible etiologies include acute PE, hypovolemia. No definitive source of infection.   Fever did not recur since 7/29. No infection/sepsis. Antibiotics discontinued on 8/1  -after admission, patient developed tachycardia and Tmax 100.8,no recurrence   -Procalcitonin level <0.05. Lactic acid 0.9. Blood culture in process,negative thus far. UA ,negative. Would like to obtain culture from right axillary drain.  -Patient was started on empiric antibiotics with vancomycin, Levaquin and tobramycin. DC tobramycin, continue with vancomycin Levaquin. Infection is looking less likely  --CT abdomen pelvis no intra-abdominal evidence of infection. --Temp of 101 this a.m., no source of infection, she was just taken off antibiotics after sepsis work-up was negative. Monitor septic work-up as needed     #Hx R breast cancer   #Metastatic melanoma  -Outpatient follow up ,may need CT PET scan     #HTN  -continue hctz, cardura and metoprolol     #Dyslipidemia  -continue pravastatin and zetia    #Hyponatremia likely due to hypovolemia: Improved with IV fluids. DVT prophylaxis: Full dose Lovenox  MPOA: Ethelene Masker,   Code: Full  Disposition: She is very weak, she needs SNF rehab     FUNCTIONAL STATUS PRIOR TO HOSPITALIZATION Ambulatory with Use of [address] Problems  Date Reviewed: 7/28/2021        Codes Class Noted POA    Pulmonary emboli [facility]) ICD-10-CM: I26.99  ICD-9-CM: 415.19  7/28/2021 Unknown                Review of Systems:   A comprehensive review of systems was negative except for that written in the HPI. Vital Signs:    Last 24hrs VS reviewed since prior progress note.  Most recent are:  Visit Vitals  /64 (BP 1 Location: Left arm, BP Patient Position: Sitting)   Pulse 73   Temp 97.5 °F (36.4 °C)   Resp 18   Ht 5' 3\" (1.6 m)   Wt 64.7 kg (142 lb 11.2 oz)   SpO2 96%   BMI 25.28 kg/m²         Intake/Output Summary (Last 24 hours) at 8/2/2021 1657  Last data filed at 8/1/2021 2334  Gross per 24 hour   Intake 300 ml   Output 30 ml   Net 270 ml        Physical Examination:     I had a face to face encounter with this patient and independently examined them on8/2/2021 as outlined below:        Constitutional:  Alert and oriented x3. Not in distress. HEENT:  Atraumatic. Oral mucosa moist,. Non icteric sclera. No pallor. Resp:  CTA bilaterally. No wheezing/rhonchi/rales. No accessory muscle use   Chest Wall:  Right axillary drain in place draining serosanguineous fluid. CV:  Regular rhythm, normal rate, no murmurs, gallops, rubs    GI:  Soft, non distended, non tender. normoactive bowel sounds, no hepatosplenomegaly    :  No CVA or suprapubic tenderness    Musculoskeletal:  Trace peripheral edema, warm, 2+ pulses throughout    Neurologic:  Mental status:AAOx3,   Cranial nerves II-XII : WNL  Motor exam:Moves all extremities symmetrically              Data Review:    Review and/or order of clinical lab test  Review and/or order of tests in the radiology section of CPT  Review and/or order of tests in the medicine section of CPT      Labs:     Recent Labs     08/02/21 0127 07/31/21  0057   WBC 8.3 4.5   HGB 8.9* 8.8*   HCT 26.2* 27.6*    429*     Recent Labs     08/02/21 0127 07/31/21 0057   * 135*   K 4.0 4.0   CL 97 103   CO2 30 26   BUN 12 10   CREA 0.74 0.62   * 101*   CA 9.0 9.0     Recent Labs     08/02/21 0127   ALT 24   AP 61   TBILI 0.4   TP 6.5   ALB 2.3*   GLOB 4.2*     No results for input(s): INR, PTP, APTT, INREXT, INREXT in the last 72 hours. No results for input(s): FE, TIBC, PSAT, FERR in the last 72 hours. Lab Results   Component Value Date/Time    Folate >24.0 (H) 01/09/2010 03:15 AM      No results for input(s): PH, PCO2, PO2 in the last 72 hours. No results for input(s): CPK, CKNDX, TROIQ in the last 72 hours.     No lab exists for component: CPKMB  Lab Results   Component Value Date/Time    Cholesterol, total 150 05/07/2010 07:39 AM    HDL Cholesterol 41 05/07/2010 07:39 AM    LDL, calculated 72.2 05/07/2010 07:39 AM    Triglyceride 184 05/07/2010 07:39 AM    CHOL/HDL Ratio 3.7 05/07/2010 07:39 AM     Lab Results   Component Value Date/Time    Glucose (POC) 158 (H) 05/15/2021 09:50 PM    Glucose (POC) 109 (H) 03/10/2021 02:12 PM     Lab Results   Component Value Date/Time    Color YELLOW/STRAW 07/29/2021 04:44 PM    Appearance CLEAR 07/29/2021 04:44 PM    Specific gravity 1.021 07/29/2021 04:44 PM    Specific gravity <1.005 01/21/2011 04:45 AM    pH (UA) 5.5 07/29/2021 04:44 PM    Protein Negative 07/29/2021 04:44 PM    Glucose Negative 07/29/2021 04:44 PM    Ketone Negative 07/29/2021 04:44 PM    Bilirubin Negative 07/29/2021 04:44 PM    Urobilinogen 0.2 07/29/2021 04:44 PM    Nitrites Negative 07/29/2021 04:44 PM    Leukocyte Esterase Negative 07/29/2021 04:44 PM    Epithelial cells FEW 07/29/2021 04:44 PM    Bacteria Negative 07/29/2021 04:44 PM    WBC 0-4 07/29/2021 04:44 PM    RBC 0-5 07/29/2021 04:44 PM         Medications Reviewed:     Current Facility-Administered Medications   Medication Dose Route Frequency    ondansetron (ZOFRAN) injection 2 mg  2 mg IntraVENous Q4H PRN    pantoprazole (PROTONIX) tablet 40 mg  40 mg Oral ACB    calcium carbonate (TUMS) chewable tablet 200 mg [elemental]  200 mg Oral TID WITH MEALS    sodium chloride (NS) flush 5-10 mL  5-10 mL IntraVENous PRN    enoxaparin (LOVENOX) injection 60 mg  1 mg/kg SubCUTAneous Q12H    oxyCODONE-acetaminophen (PERCOCET) 5-325 mg per tablet 1 Tablet  1 Tablet Oral Q4H PRN    acetaminophen (TYLENOL) tablet 650 mg  650 mg Oral Q6H PRN    Or    acetaminophen (TYLENOL) suppository 650 mg  650 mg Rectal Q6H PRN    ezetimibe (ZETIA) tablet 10 mg  10 mg Oral DAILY    doxazosin (CARDURA) tablet 2 mg  2 mg Oral DAILY    [Held by provider] hydroCHLOROthiazide (HYDRODIURIL) tablet 25 mg  25 mg Oral DAILY    metoprolol tartrate (LOPRESSOR) tablet 50 mg  50 mg Oral BID    aspirin delayed-release tablet 81 mg  81 mg Oral DAILY    pravastatin (PRAVACHOL) tablet 20 mg  20 mg Oral QHS     ______________________________________________________________________  EXPECTED LENGTH OF STAY: 4d 2h  ACTUAL LENGTH OF STAY:          5                 Harika Loving MD

## 2021-08-02 NOTE — PROGRESS NOTES
Problem: Falls - Risk of  Goal: *Absence of Falls  Description: Document [employer] Fall Risk and appropriate interventions in the flowsheet. Outcome: Progressing Towards Goal  Note: Fall Risk Interventions:  Mobility Interventions: PT Consult for mobility concerns, PT Consult for assist device competence, Patient to call before getting OOB, Utilize walker, cane, or other assistive device         Medication Interventions: Patient to call before getting OOB, Bed/chair exit alarm    Elimination Interventions: Call light in reach, Patient to call for help with toileting needs              Problem: Pressure Injury - Risk of  Goal: *Prevention of pressure injury  Description: Document Anmol Scale and appropriate interventions in the flowsheet.   Outcome: Progressing Towards Goal  Note: Pressure Injury Interventions:       Moisture Interventions: Internal/External urinary devices    Activity Interventions: PT/OT evaluation    Mobility Interventions: PT/OT evaluation    Nutrition Interventions: Offer support with meals,snacks and hydration    Friction and Shear Interventions: Minimize layers, Lift team/patient mobility team                Problem: Discharge Planning  Goal: *Discharge to safe environment  Outcome: Progressing Towards Goal

## 2021-08-02 NOTE — PROGRESS NOTES
Problem: Mobility Impaired (Adult and Pediatric)  Goal: *Acute Goals and Plan of Care (Insert Text)  Description: FUNCTIONAL STATUS PRIOR TO ADMISSION: Patient was modified independent using a walker for functional mobility. HOME SUPPORT PRIOR TO ADMISSION: The patient lived with spouse but did not require assist.    Physical Therapy Goals  Initiated 7/30/2021  1. Patient will move from supine to sit and sit to supine  and roll side to side in bed with minimal assistance/contact guard assist within 7 day(s). 2.  Patient will transfer from bed to chair and chair to bed with minimal assistance  using the least restrictive device within 7 day(s). 3.  Patient will perform sit to stand with minimal assistance  within 7 day(s). 4.  Patient will ambulate with minimal assistance/contact guard assist for 75 feet with the least restrictive device within 7 day(s). 5.  Patient will ascend/descend 3 stairs with 1 handrail(s) with minimal assistance/contact guard assist within 7 day(s). Outcome: Progressing Towards Goal   PHYSICAL THERAPY TREATMENT  Patient: Nohemi Bonds (65 y.o. female)  Date: 8/2/2021  Diagnosis: Pulmonary emboli (HCC) [I26.99] <principal problem not specified>       Precautions: Fall  Chart, physical therapy assessment, plan of care and goals were reviewed. ASSESSMENT  Patient continues with skilled PT services and is slowly progressing towards goals. Pt presents with fatigue and decreased activity tolerance, decreased strength, decreased ROM RUE due to recent surgery, decreased balance, and impaired functional mobility well below her baseline. Pt had no complaints of left sided back pain today. Pt requires 2 person assistance for bed mobility, transfers, and bed to chair transfer with rolling walker. Pt has excessive posterior lean in standing and shuffled her feet to bedside chair slowly with rolling walker support. Pt remained up in chair at session end.      Current Level of Function Impacting Discharge (mobility/balance): bed mobility maximum assist x 2, sit to stand with moderate assist x 2, bed to chair with rolling walker with moderate assist x 2    Other factors to consider for discharge: fall risk, well below her baseline, recent surgery and readmission with PE          PLAN :  Patient continues to benefit from skilled intervention to address the above impairments. Continue treatment per established plan of care. to address goals. Recommendation for discharge: (in order for the patient to meet his/her long term goals)  Therapy up to 5 days/week in SNF setting    This discharge recommendation:  Has been made in collaboration with the attending provider and/or case management    IF patient discharges home will need the following DME: to be determined (TBD) at rehab       SUBJECTIVE:   Patient agreeable to work with therapy. Pt only complained of discomfort from [address] when sitting. Purewick removed. OBJECTIVE DATA SUMMARY:   Critical Behavior:  Neurologic State: Alert  Orientation Level: Oriented X4  Cognition: Follows commands  Safety/Judgement: Lack of insight into deficits  Functional Mobility Training:  Bed Mobility:     Supine to Sit: Assist x2; Additional time; Adaptive equipment;Maximum assistance     Scooting: Maximum assistance;Assist x1        Transfers:  Sit to Stand: Moderate assistance;Assist x2;Adaptive equipment; Additional time  Stand to Sit: Minimum assistance;Assist x2;Adaptive equipment; Additional time        Bed to Chair: Moderate assistance;Assist x2; Additional time; Adaptive equipment, rolling walker                    Balance:  Sitting: Impaired  Sitting - Static: Fair (occasional)  Sitting - Dynamic: Poor (constant support)  Standing: Impaired  Standing - Static: Constant support;Poor  Standing - Dynamic : Poor;Constant support            Pain Rating:  Denies pain except for discomfort from purewick while sitting.      Activity Tolerance:   Fair, fatigues quickly     After treatment patient left in no apparent distress:   Sitting in chair and Caregiver / family present    COMMUNICATION/COLLABORATION:   The patients plan of care was discussed with: Registered nurse.      Janet Parra   Time Calculation: 15 mins

## 2021-08-02 NOTE — PROGRESS NOTES
Problem: Self Care Deficits Care Plan (Adult)  Goal: *Acute Goals and Plan of Care (Insert Text)  Description:   FUNCTIONAL STATUS PRIOR TO ADMISSION: Patient was modified independent using a rolling walker for functional mobility. Recent rehab at SNF(approx one month ago)    HOME SUPPORT: The patient lived with . Occupational Therapy Goals  Initiated 8/2/2021  1. Patient will perform UB bathing and dressing with supervision/set-up within 7 day(s). 2.  Patient will perform lower body dressing with AD with moderate assistance  within 7 day(s). 3.  Patient will perform sit <> stand to participate with standing ADL tasks with minimal assistance within 7 day(s). 4.  Patient will perform toilet transfers with moderate assistance at RW level to [facility] within 7 day(s). 5.  Patient will perform all aspects of toileting with moderate assistance  within 7 day(s). 6.  Patient will participate in upper extremity therapeutic exercise/activities through comfortable AROM with supervision/set-up for 8 minutes within 7 day(s). Outcome: Not Met   OCCUPATIONAL THERAPY EVALUATION  Patient: Miriam Cuello (78 y.o. female)  Date: 8/2/2021  Primary Diagnosis: Pulmonary emboli [facility]) [I26.99]        Precautions:  Fall    ASSESSMENT  Based on the objective data described below, the patient presents with severe impairments in activity tolerance, mobility, generalized strength, standing balance and pain impacting her ability to complete ADL tasks. Hx R axillary lymph node dissection and excision of right breast implant for adenopathy and mass on 7/19 with dx of malignant melanoma. She has drain. Received in chair with  present, finishing lunch at mod indep level. She reports x2 assist at [facility] to chair which is below her baseline where she was mod indep at RW level at home. Sit > stand with max A with pushing from chair then pulling up on RW to achieve erect posture secondary to posterior lean.   Standing < 30 seconds prior to reports of L lateral back pain. Patient desire to sit up in chair, ed on calling out for assistance with returning to bed and to call prior to fatigue. Current Level of Function Impacting Discharge (ADLs/self-care): seated grooming set up, LB care total A, self care transfer x2(per PT)    Functional Outcome Measure: The patient scored 25/100 on the Barthel Index outcome measure which is indicative of severe impairment with ADL tasks. Other factors to consider for discharge: Patient is presenting below baseline. She was overall mod indep with ADL tasks prior to surgery on 7/19/21. She reports additional assistance at home post operative.  is unable to provide physical assistance of x2 support that is required at this time. Recommend SNF. She reports having been to SNF recently(last month, did not elaborate on why). Patient will benefit from skilled therapy intervention to address the above noted impairments. PLAN :  Recommendations and Planned Interventions: self care training, functional mobility training, therapeutic exercise, balance training, therapeutic activities, endurance activities, patient education, home safety training, and family training/education    Frequency/Duration: Patient will be followed by occupational therapy 5 times a week to address goals. Recommendation for discharge: (in order for the patient to meet his/her long term goals)  Therapy up to 5 days/week in SNF setting    This discharge recommendation:  Has been made in collaboration with the attending provider and/or case management    IF patient discharges home will need the following DME: TBD SNF       SUBJECTIVE:   Patient stated Rtam Rema can try to do it but I want to stay in the chair after.     OBJECTIVE DATA SUMMARY:   HISTORY:   Past Medical History:   Diagnosis Date    Arrhythmia     LBBB    Arthritis     SPINE    Axillary adenopathy 3/24/2021    Calculus of kidney 1990    Cancer ([facility] [address] right breast X2 ; BCCA    Chronic pain     COVID-19 vaccine series completed     MODERNA3/31/21,4/28/21    Environmental allergies     GERD (gastroesophageal reflux disease)     Hypercholesterolemia     Hypertension     Lymphadenopathy     Nausea & vomiting     Other ill-defined conditions(799.89)     high cholesterol    Other ill-defined conditions(799.89)     NO BP/VENIPUNCTURES RIGHT ARM (POST LYMPH NODE DISSECTION)    Other ill-defined conditions(799.89)     MITRAL VALVE PROLAPSE    Pleuritic chest pain, left 7/28/2021    Recurrent malignant melanoma of skin ([facility] [address] 9/23/2020    Stroke ([facility] [address]     TIA 2009  (TAKING ASA)     Past Surgical History:   Procedure Laterality Date    HX BACK SURGERY  1998    l4-l5  ([facility])    HX BREAST LUMPECTOMY  1999    right    HX BREAST RECONSTRUCTION Right 8/27/2014    Revision Right Reconstructed Breast performed by Tess Vicente MD at [address] [place] [address] Bilateral 2/20/2015    REVISION RIGHT RECONSTRUCTED BREAST/REMOVE TISSUE EXPANDERS/PLACE IMPLANT RIGHT/MASTOPEXY LEFT performed by Tess Vicente MD at [place] [address]    HX BREAST RECONSTRUCTION  07/16/2021    Right axillary lymph node dissection and excision of right breast implant.     HX CATARACT REMOVAL      BILATERAL    HX DILATION AND CURETTAGE      HX GI  2006    COLONOSCOPY    HX HEENT      BCCA REMOVED FROM FOREHEAD    HX LITHOTRIPSY  1991    HX MALIGNANT SKIN LESION EXCISION  10/12/2020    Wide excision of recurrent melanoma of the back    HX MASTECTOMY Right 2014    HX ORTHOPAEDIC  2005    left bunionectomy    HX OTHER SURGICAL      BCCA REMOVED FROM BACK     HX MINAL AND BSO      HX TUBAL LIGATION      HX UROLOGICAL      kidney stone    HX UROLOGICAL  2016    BLADDER SLING       Expanded or extensive additional review of patient history:     Home Situation  Home Environment: Private residence  # Steps to Enter: 3  Rails to Enter: Yes  [employer] : Bilateral  One/Two Story Residence: One story  Living Alone: No  Support Systems: Spouse/Significant Other/Partner, Family member(s)  Patient Expects to be Discharged to[de-identified] House  Current DME Used/Available at Home: Walker, rolling, Commode, bedside, Safety frame toliet, Shower chair  Tub or Shower Type: Shower    Hand dominance: Right    EXAMINATION OF PERFORMANCE DEFICITS:  Cognitive/Behavioral Status:  Neurologic State: Alert  Orientation Level: Oriented X4  Cognition: Follows commands             Vision/Perceptual:                                     Range of Motion:  RUE impaired shoulder flexion/abduction post operatively, functional  LUE WDL                Strength:  LUE WDL  RUE impaired shoulder                   Coordination:     Fine Motor Skills-Upper: Left Intact; Right Intact    Gross Motor Skills-Upper: Left Intact; Right Intact    Tone & Sensation:  BUE WDL         Balance:  Sitting: Impaired  Sitting - Static: Fair (occasional)  Sitting - Dynamic: Poor (constant support)  Standing: Impaired  Standing - Static: Constant support;Poor  Standing - Dynamic : Poor;Constant support    Functional Mobility and Transfers for ADLs:  Bed Mobility:  Supine to Sit: Assist x2; Additional time; Adaptive equipment;Maximum assistance  Scooting: Maximum assistance;Assist x1    Transfers:  Sit to Stand: Maximum assistance  Stand to Sit: Moderate assistance (assist for safe descent)  Bed to Chair: Moderate assistance;Assist x2; Additional time; Adaptive equipment    ADL Assessment:  Feeding: Setup    Oral Facial Hygiene/Grooming: Setup;Supervision (seated only)    Bathing: Maximum assistance    Upper Body Dressing: Minimum assistance    Lower Body Dressing: Maximum assistance; Total assistance    Toileting: Maximum assistance; Total assistance                ADL Intervention and task modifications:  Feeding  Utensil Management: Modified independent  Food to Mouth: Modified independent  Drink to Mouth: Modified independent    Patient instructed and indicated understanding the benefits of maintaining activity tolerance, functional mobility, and independence with self care tasks during acute stay  to ensure safe return home and to baseline. Encouraged patient to increase frequency and duration OOB, be out of bed for all meals, perform daily ADLs (as approved by RN/MD regarding bathing etc), and performing functional mobility to/from bathroom. Provided education through multi-modal cues for RW safety including proper positioning, hand placement,  and safety. Patient able to perform sit <> stand with max assistance. Fair understanding of RW use and safety. Provided education with patient on fall prevention for hospital and at home. This includes not getting OOB/chair/toilet without staff assistance, good lighting, good footwear, and recommended AD use. Patient with fair understanding. Functional Measure:  Barthel Index:    Bathin  Bladder: 5  Bowels: 5  Groomin  Dressin  Feedin  Mobility: 0  Stairs: 0  Toilet Use: 0  Transfer (Bed to Chair and Back): 5  Total: 25/100        The Barthel ADL Index: Guidelines  1. The index should be used as a record of what a patient does, not as a record of what a patient could do. 2. The main aim is to establish degree of independence from any help, physical or verbal, however minor and for whatever reason. 3. The need for supervision renders the patient not independent. 4. A patient's performance should be established using the best available evidence. Asking the patient, friends/relatives and nurses are the usual sources, but direct observation and common sense are also important. However direct testing is not needed. 5. Usually the patient's performance over the preceding 24-48 hours is important, but occasionally longer periods will be relevant. 6. Middle categories imply that the patient supplies over 50 per cent of the effort.   7. Use of aids to be independent is allowed. Lisette Garcia., Barthel, D.W. (1111). Functional evaluation: the Barthel Index. [address] [facility] (14)2. LORI Villaseñor, John Burks., Anthony Mills, Teo Garcia, [address] (1999). Measuring the change indisability after inpatient rehabilitation; comparison of the responsiveness of the Barthel Index and Functional [place] Measure. Journal of Neurology, Neurosurgery, and Psychiatry, 66(4), 835-721. CARLOS Cornejo, FAROOQ Simons, & Shadi Jackson M.A. (2004.) Assessment of post-stroke quality of life in cost-effectiveness studies: The usefulness of the Barthel Index and the EuroQoL-5D. Quality of Life Research, 15, 291-56         Occupational Therapy Evaluation Charge Determination   History Examination Decision-Making   LOW Complexity : Brief history review  LOW Complexity : 1-3 performance deficits relating to physical, cognitive , or psychosocial skils that result in activity limitations and / or participation restrictions  LOW Complexity : No comorbidities that affect functional and no verbal or physical assistance needed to complete eval tasks       Based on the above components, the patient evaluation is determined to be of the following complexity level: LOW   Pain Rating:  L lateral back moderate pain with standing,pain medication available, patient plans to take    Activity Tolerance:   Fair    After treatment patient left in no apparent distress:    Sitting in chair, Call bell within reach, and Caregiver / family present    COMMUNICATION/EDUCATION:   The patients plan of care was discussed with: Physical therapist and Registered nurse. Home safety education was provided and the patient/caregiver indicated understanding. and Patient/family agree to work toward stated goals and plan of care. This patients plan of care is appropriate for delegation to \[facility]\"".     Thank you for this referral.  Chandler Lester OT  Time Calculation: 15 mins

## 2021-08-02 NOTE — PROGRESS NOTES
ANDRE:    RUR 14%    Disposition: PT recommending SNF. Patient lives at home with her  and they agree that she will need rehab before returning home. Referral sent to [facility] via [employer]. Awaiting PT/ OT notes for insurance auth. [facility] [employer]. accepted patient. Patient asked CM to send a referral to [facility]. Referral sent via [employer]    3:00-Insurance auth started with [facility]. CM spoke to Vallerie Cart at [phone]. Reference number is [id number].   CM faxed clinicals to [phone]    Transportation: [facility]    Primary Contact: Florian Ellington(spouse) [phone]    Follow up: PCP/Specialist    Chuckie Pierce RN/CRM  [phone]

## 2021-08-03 NOTE — PROGRESS NOTES
Problem: Falls - Risk of  Goal: *Absence of Falls  Description: Document Corrine Delgado Fall Risk and appropriate interventions in the flowsheet.   Outcome: Progressing Towards Goal  Note: Fall Risk Interventions:  Mobility Interventions: PT Consult for mobility concerns         Medication Interventions: Evaluate medications/consider consulting pharmacy    Elimination Interventions: Call light in reach

## 2021-08-03 NOTE — PROGRESS NOTES
Problem: Mobility Impaired (Adult and Pediatric)  Goal: *Acute Goals and Plan of Care (Insert Text)  Description: FUNCTIONAL STATUS PRIOR TO ADMISSION: Patient was modified independent using a walker for functional mobility. HOME SUPPORT PRIOR TO ADMISSION: The patient lived with spouse but did not require assist.    Physical Therapy Goals  Initiated 7/30/2021  1. Patient will move from supine to sit and sit to supine  and roll side to side in bed with minimal assistance/contact guard assist within 7 day(s). 2.  Patient will transfer from bed to chair and chair to bed with minimal assistance  using the least restrictive device within 7 day(s). 3.  Patient will perform sit to stand with minimal assistance  within 7 day(s). 4.  Patient will ambulate with minimal assistance/contact guard assist for 75 feet with the least restrictive device within 7 day(s). 5.  Patient will ascend/descend 3 stairs with 1 handrail(s) with minimal assistance/contact guard assist within 7 day(s). Outcome: Progressing Towards Goal  PHYSICAL THERAPY TREATMENT  Patient: Sandra Johansen (76 y.o. female)  Date: 8/3/2021  Diagnosis: Pulmonary emboli (HCC) [I26.99] <principal problem not specified>       Precautions: Fall  Chart, physical therapy assessment, plan of care and goals were reviewed. ASSESSMENT  Patient continues with skilled PT services and is progressing towards goals. She is received sitting EOB eating lunch w/ OT present. Pt is mobilizing better today than in previous session. She was able to stand from bed w/ Min Ax2 to RW. She was able to side step to chair at bedside and then take small, shuffled steps fwd/back from chair w/ CGAx2. She remained OOB in chair to finish lunch. CAll bell in reach and instructed to call for help/NSG using call bell. At this time, feel pt is working towards tolerating 3hrs of skilled PT and would benefit from IPR at d/c. PTA, pt was Mod I w/AD.      Current Level of Function Impacting Discharge (mobility/balance): Min Ax2 for sit<>stand and for short gait w/ RW. Other factors to consider for discharge: mobility below baseline         PLAN :  Patient continues to benefit from skilled intervention to address the above impairments. Continue treatment per established plan of care. to address goals. Recommendation for discharge: (in order for the patient to meet his/her long term goals)  Therapy 3 hours per day 5-7 days per week    This discharge recommendation:  Has been made in collaboration with the attending provider and/or case management    IF patient discharges home will need the following DME: patient owns DME required for discharge       SUBJECTIVE:   Patient stated Haydee Roman was wondering if that could be done.  (referring to warming her lunch in microwave). OBJECTIVE DATA SUMMARY:   Critical Behavior:  Neurologic State: Alert  Orientation Level: Oriented X4  Cognition: Appropriate for age attention/concentration  Safety/Judgement: Fall prevention, Home safety  Functional Mobility Training:  Bed Mobility:     Supine to Sit:  (received sitting EOB w/ OT  )  Sit to Supine:  (remained OOB in chair)           Transfers:  Sit to Stand: Minimum assistance;Assist x2  Stand to Sit: Contact guard assistance;Minimum assistance;Assist x2        Bed to Chair: Minimum assistance;Assist x2; Additional time; Adaptive equipment (assist to maneuver rolling walker)                    Balance:  Sitting: Impaired  Sitting - Static: Fair (occasional)  Standing: Impaired; With support  Standing - Static: Constant support; Fair  Standing - Dynamic : Not tested  Ambulation/Gait Training:  Distance (ft): 3 Feet (ft)  Assistive Device: Gait belt;Walker, rolling           Gait Abnormalities: Decreased step clearance; Step to gait        Base of Support: Center of gravity altered;Narrowed     Speed/Denae: Slow  Step Length: Left shortened;Right shortened                   Therapeutic Exercises:   Scap retraction x10     Pain Rating:  No verbal c/o pain      Activity Tolerance: WNL    After treatment patient left in no apparent distress:   Supine in bed and Call bell within reach    COMMUNICATION/COLLABORATION:   The patients plan of care was discussed with: Occupational therapist, Registered nurse, and Case management.      Stephanie Lloyd PTA   Time Calculation: 21 mins

## 2021-08-03 NOTE — PROGRESS NOTES
Faculty or Preceptor Review of Student Work    8/3/2021  - Shift times - 0730 to 1300    The student documentation of patient care for Rhae Peels has been reviewed and approved. All medications have been administered under the direct supervision of the faculty or preceptor.     Roberto Marshall

## 2021-08-03 NOTE — PROGRESS NOTES
[address] [facility] Adult  Hospitalist Group                                                                                          Hospitalist Progress Note  Dipti Hallman MD  Answering service: [phone] OR [phone] from in house phone        Date of Service:  8/3/2021  NAME:  Becky Downey  :  1940  MRN:  [id number]    This documentation was facilitated by a Voice Recognition software and may contain inadvertent typographical errors. Admission Summary:   Becky Downey is a 78 y.o. female with right breast cancer s/p R axillary lymph node dissection and excision of right breast implant for adenopathy and mass on  with path + for metastatic melanoma, GERD, dyslipidemia, HTN, TIA and hx malignant melanoma who presents with dyspnea and pleuritic chest pain for the past several days. Interval history / Subjective:   No acute overnight events. Weak otherwise continues to feel better. No fever the last 24 hours. Her  present at the bedside, updated and questions answered. Assessment & Plan:   Nausea and vomiting, suspect gastritis, resolved. --Antiemetics,antiacids. --CT A/P unremarkable for any acute processes. Acute pulmonary emboli  --CT of the lung showed small burden of bilateral acute versus subacute pleural embolism, new multiple subcentimeter trach right lung nodules which may represent metastatic disease.  -Hemodynamically stable, no oxygen requirement, no right heart strain  -Continue Lovenox 1 mg/kg every 12 hourly. Lovenox is preferred anticoagulants in the setting of cancer (metastatic melanoma) however patient is not inclined to continue with injections, so patient will probably for be discharged on NOACs. -- Hematology/oncology consulted, recommended Xarlto on discharge. SIRS (fever and tachycardia) possible etiologies include acute PE, hypovolemia. No definitive source of infection. Fever did not recur since . No infection/sepsis. Antibiotics discontinued on 8/1  -after admission, patient developed tachycardia and Tmax 100.8,no recurrence   -Procalcitonin level <0.05. Lactic acid 0.9. Blood culture in process,negative thus far. UA ,negative. Would like to obtain culture from right axillary drain.  -Patient was started on empiric antibiotics with vancomycin, Levaquin and tobramycin. DC tobramycin, continue with vancomycin Levaquin. Infection is looking less likely  --CT abdomen pelvis no intra-abdominal evidence of infection. --Temp of 101 a.m. of 8/2, no recurrence. Continue to monitor. #Hx R breast cancer   #Metastatic melanoma  -Outpatient follow up ,may need CT PET scan     #HTN  -continue hctz, cardura and metoprolol     #Dyslipidemia  -continue pravastatin and zetia    #Hyponatremia likely due to hypovolemia: Improved with IV fluids. DVT prophylaxis: Full dose Lovenox  MPOA: Ethelene Masker,   Code: Full  Disposition: SNF versus IPR     FUNCTIONAL STATUS PRIOR TO HOSPITALIZATION Ambulatory with Use of Assistive Devices     Hospital Problems  Date Reviewed: 7/28/2021        Codes Class Noted POA    Pulmonary emboli [facility]) ICD-10-CM: I26.99  ICD-9-CM: 415.19  7/28/2021 Unknown                Review of Systems:   A comprehensive review of systems was negative except for that written in the HPI. Vital Signs:    Last 24hrs VS reviewed since prior progress note. Most recent are:  Visit Vitals  /74   Pulse 79   Temp 98.6 °F (37 °C)   Resp 17   Ht 5' 3\" (1.6 m)   Wt 64.7 kg (142 lb 11.2 oz)   SpO2 95%   BMI 25.28 kg/m²       No intake or output data in the 24 hours ending 08/03/21 8563     Physical Examination:     I had a face to face encounter with this patient and independently examined them on8/3/2021 as outlined below:        Constitutional:  Alert and oriented x3. Not in distress. HEENT:  Atraumatic. Oral mucosa moist,. Non icteric sclera. No pallor. Resp:  CTA bilaterally. No wheezing/rhonchi/rales.  No accessory muscle use   Chest Wall:  Right axillary drain in place draining serosanguineous fluid. CV:  Regular rhythm, normal rate, no murmurs, gallops, rubs    GI:  Soft, non distended, non tender. normoactive bowel sounds, no hepatosplenomegaly    :  No CVA or suprapubic tenderness    Musculoskeletal:  Trace peripheral edema, warm, 2+ pulses throughout    Neurologic:  Mental status:AAOx3,   Cranial nerves II-XII : WNL  Motor exam:Moves all extremities symmetrically              Data Review:    Review and/or order of clinical lab test  Review and/or order of tests in the radiology section of CPT  Review and/or order of tests in the medicine section of CPT      Labs:     Recent Labs     08/02/21  0127   WBC 8.3   HGB 8.9*   HCT 26.2*        Recent Labs     08/02/21  0127   *   K 4.0   CL 97   CO2 30   BUN 12   CREA 0.74   *   CA 9.0     Recent Labs     08/02/21  0127   ALT 24   AP 61   TBILI 0.4   TP 6.5   ALB 2.3*   GLOB 4.2*     No results for input(s): INR, PTP, APTT, INREXT, INREXT in the last 72 hours. No results for input(s): FE, TIBC, PSAT, FERR in the last 72 hours. Lab Results   Component Value Date/Time    Folate >24.0 (H) 01/09/2010 03:15 AM      No results for input(s): PH, PCO2, PO2 in the last 72 hours. No results for input(s): CPK, CKNDX, TROIQ in the last 72 hours.     No lab exists for component: CPKMB  Lab Results   Component Value Date/Time    Cholesterol, total 150 05/07/2010 07:39 AM    HDL Cholesterol 41 05/07/2010 07:39 AM    LDL, calculated 72.2 05/07/2010 07:39 AM    Triglyceride 184 05/07/2010 07:39 AM    CHOL/HDL Ratio 3.7 05/07/2010 07:39 AM     Lab Results   Component Value Date/Time    Glucose (POC) 158 (H) 05/15/2021 09:50 PM    Glucose (POC) 109 (H) 03/10/2021 02:12 PM     Lab Results   Component Value Date/Time    Color YELLOW/STRAW 07/29/2021 04:44 PM    Appearance CLEAR 07/29/2021 04:44 PM    Specific gravity 1.021 07/29/2021 04:44 PM    Specific gravity <1.005 01/21/2011 04:45 AM    pH (UA) 5.5 07/29/2021 04:44 PM    Protein Negative 07/29/2021 04:44 PM    Glucose Negative 07/29/2021 04:44 PM    Ketone Negative 07/29/2021 04:44 PM    Bilirubin Negative 07/29/2021 04:44 PM    Urobilinogen 0.2 07/29/2021 04:44 PM    Nitrites Negative 07/29/2021 04:44 PM    Leukocyte Esterase Negative 07/29/2021 04:44 PM    Epithelial cells FEW 07/29/2021 04:44 PM    Bacteria Negative 07/29/2021 04:44 PM    WBC 0-4 07/29/2021 04:44 PM    RBC 0-5 07/29/2021 04:44 PM         Medications Reviewed:     Current Facility-Administered Medications   Medication Dose Route Frequency    ondansetron (ZOFRAN) injection 2 mg  2 mg IntraVENous Q4H PRN    pantoprazole (PROTONIX) tablet 40 mg  40 mg Oral ACB    calcium carbonate (TUMS) chewable tablet 200 mg [elemental]  200 mg Oral TID WITH MEALS    sodium chloride (NS) flush 5-10 mL  5-10 mL IntraVENous PRN    enoxaparin (LOVENOX) injection 60 mg  1 mg/kg SubCUTAneous Q12H    oxyCODONE-acetaminophen (PERCOCET) 5-325 mg per tablet 1 Tablet  1 Tablet Oral Q4H PRN    acetaminophen (TYLENOL) tablet 650 mg  650 mg Oral Q6H PRN    Or    acetaminophen (TYLENOL) suppository 650 mg  650 mg Rectal Q6H PRN    ezetimibe (ZETIA) tablet 10 mg  10 mg Oral DAILY    doxazosin (CARDURA) tablet 2 mg  2 mg Oral DAILY    [Held by provider] hydroCHLOROthiazide (HYDRODIURIL) tablet 25 mg  25 mg Oral DAILY    metoprolol tartrate (LOPRESSOR) tablet 50 mg  50 mg Oral BID    aspirin delayed-release tablet 81 mg  81 mg Oral DAILY    pravastatin (PRAVACHOL) tablet 20 mg  20 mg Oral QHS     ______________________________________________________________________  EXPECTED LENGTH OF STAY: 4d 2h  ACTUAL LENGTH OF STAY:          6                 Sneha Amaya MD

## 2021-08-03 NOTE — PROGRESS NOTES
Spiritual Care Partner Volunteer visited patient in [facility] on 8/3/21. Documented by:   Chaplain Corona MDiv, MACE  [phone] PRAY [phone])

## 2021-08-03 NOTE — PROGRESS NOTES
Problem: Self Care Deficits Care Plan (Adult)  Goal: *Acute Goals and Plan of Care (Insert Text)  Description:   FUNCTIONAL STATUS PRIOR TO ADMISSION: Patient was modified independent using a rolling walker for functional mobility. Recent rehab at SNF(approx one month ago)    HOME SUPPORT: The patient lived with . Occupational Therapy Goals  Initiated 8/2/2021  1. Patient will perform UB bathing and dressing with supervision/set-up within 7 day(s). 2.  Patient will perform lower body dressing with AD with moderate assistance  within 7 day(s). 3.  Patient will perform sit <> stand to participate with standing ADL tasks with minimal assistance within 7 day(s). 4.  Patient will perform toilet transfers with moderate assistance at  level to [facility] within 7 day(s). 5.  Patient will perform all aspects of toileting with moderate assistance  within 7 day(s). 6.  Patient will participate in upper extremity therapeutic exercise/activities through comfortable AROM with supervision/set-up for 8 minutes within 7 day(s). Outcome: Progressing Towards Goal    OCCUPATIONAL THERAPY TREATMENT  Patient: Brant Patel (75 y.o. female)  Date: 8/3/2021  Diagnosis: Pulmonary emboli (HCC) [I26.99] <principal problem not specified>       Precautions: Fall  Chart, occupational therapy assessment, plan of care, and goals were reviewed. ASSESSMENT  Patient continues with skilled OT services and is progressing towards goals. Patient making good progress, able to sit edge of bed on air mattress posing increased balance challenge for ~15 minutes with CGA. Able to stand with min assist of 2 and pivot to chair with min assist of 2 using rolling walker. Instructed on skin protection and increased toileting and now that able to transfer better to call for assist to bedside commode.       Current Level of Function Impacting Discharge (ADLs): max assist LE ADL's, toileting, min assist of 2 for functional transfers, mod assist of 1 for sup to sit    Other factors to consider for discharge: feel she would benefit from rehab         PLAN :  Patient continues to benefit from skilled intervention to address the above impairments. Continue treatment per established plan of care to address goals. Recommend with staff: intentional toileting, pur-wic with decreased effectiveness    Recommend next OT session: sitting and standing ADLs, balance tasks    Recommendation for discharge: (in order for the patient to meet his/her long term goals)  Therapy 3 hours per day 5-7 days per week    This discharge recommendation:  Has been made in collaboration with the attending provider and/or case management    IF patient discharges home will need the following DME:        SUBJECTIVE:   Patient stated I could do a whole lot more.     OBJECTIVE DATA SUMMARY:   Cognitive/Behavioral Status:  Neurologic State: Alert  Orientation Level: Oriented X4  Cognition: Appropriate for age attention/concentration  Perception: Appears intact  Perseveration: No perseveration noted  Safety/Judgement: Fall prevention;Home safety    Functional Mobility and Transfers for ADLs:  Bed Mobility:  Supine to Sit: Moderate assistance;Assist x1;Additional time    Transfers:  Sit to Stand: Minimum assistance;Assist x2; Additional time (elevated bed)  Functional Transfers  Toilet Transfer : Minimum assistance;Assist x2;Adaptive equipment; Additional time (stand pivot to bedside commode)  Bed to Chair: Minimum assistance;Assist x2; Additional time; Adaptive equipment (assist to maneuver rolling walker)    Balance:  Sitting: Impaired  Sitting - Static: Fair (occasional) (improved with time)  Standing: Impaired; Without support  Standing - Static: Constant support; Fair  Standing - Dynamic : Not tested    ADL Intervention:   Eating: facilitated sitting edge of bed and placed pillows under feet for additional support, initially required cues to maintain arousal for eating, however improved with increased time    Upper Body Dressing Assistance  Dressing Assistance: Minimum assistance; Moderate assistance (seated edge of bed)  Hospital Gown: Minimum  assistance; Moderate assistance    Lower Body Dressing Assistance  Dressing Assistance: Maximum assistance  Socks: Maximum assistance  Leg Crossed Method Used: Yes  Position Performed: Seated edge of bed (air mattress impacting balance further)    Toileting  Toileting Assistance: Total assistance(dependent)  Bladder Hygiene: Total assistance (dependent) (incontinent on arrival)  Clothing Management: Total assistance (dependent)  Pur-wic in place on arrival and patient sleeping, upon moving in bed noted to have wet brief and dara, able to roll left with min assist and right with mod assist, instructed in bridge to assist with clothing management    Instructed in use of incentive spirometer    Cognitive Retraining  Safety/Judgement: Fall prevention;Home safety    Therapeutic Exercises:   Instructed in bridge exercise in supine and positioning of LE's in bridge and maintaining    Pain:  Left side of back, noted to be due for pain med, assisted to inform RN and meds given    Activity Tolerance:   Good and requires rest breaks    After treatment patient left in no apparent distress:   Sitting in chair and Call bell within reach    COMMUNICATION/COLLABORATION:   The patients plan of care was discussed with: Physical therapy assistant, Registered nurse, and Case management.      PETER Cueto/L  Time Calculation: 49 mins

## 2021-08-04 NOTE — PROGRESS NOTES
Problem: Falls - Risk of  Goal: *Absence of Falls  Description: Document [place] Fall Risk and appropriate interventions in the flowsheet. Outcome: Progressing Towards Goal  Note: Fall Risk Interventions:  Mobility Interventions: Communicate number of staff needed for ambulation/transfer         Medication Interventions: Evaluate medications/consider consulting pharmacy    Elimination Interventions: Call light in reach              Problem: Pressure Injury - Risk of  Goal: *Prevention of pressure injury  Description: Document Anmol Scale and appropriate interventions in the flowsheet.   Outcome: Progressing Towards Goal  Note: Pressure Injury Interventions:  Sensory Interventions: Float heels    Moisture Interventions: Check for incontinence Q2 hours and as needed    Activity Interventions: PT/OT evaluation    Mobility Interventions: Float heels, PT/OT evaluation    Nutrition Interventions: Offer support with meals,snacks and hydration    Friction and Shear Interventions: Minimize layers, Lift team/patient mobility team

## 2021-08-04 NOTE — PROGRESS NOTES
[facility]                                                                                          Hospitalist Progress Note  Antoni Liao MD  Answering service: [phone] OR [phone] from in house phone        Date of Service:  2021  NAME:  Nilesh Johns  :  1940  MRN:  [id number]    This documentation was facilitated by a Voice Recognition software and may contain inadvertent typographical errors. Admission Summary:   Nilesh Johns is a 78 y.o. female with right breast cancer s/p R axillary lymph node dissection and excision of right breast implant for adenopathy and mass on  with path + for metastatic melanoma, GERD, dyslipidemia, HTN, TIA and hx malignant melanoma who presents with dyspnea and pleuritic chest pain for the past several days. Interval history / Subjective:   No acute overnight events. She had low-grade fever this morning. Patient has no complaints. Her  at the bedside, updated. Assessment & Plan:   Nausea and vomiting, suspect gastritis, resolved. --Antiemetics,antiacids. --CT A/P unremarkable for any acute processes. Acute pulmonary emboli  --CT of the lung showed small burden of bilateral acute versus subacute pleural embolism, new multiple subcentimeter trach right lung nodules which may represent metastatic disease.  -Hemodynamically stable, no oxygen requirement, no right heart strain  -Continue Lovenox 1 mg/kg every 12 hourly. Lovenox is preferred anticoagulants in the setting of cancer (metastatic melanoma) however patient is not inclined to continue with injections, so patient will probably for be discharged on NOACs. -- Hematology/oncology consulted, recommended Xarlto on discharge. SIRS (fever and tachycardia) possible etiologies include acute PE, hypovolemia. No definitive source of infection. Fever did not recur since . No infection/sepsis.   Antibiotics discontinued on 8/1  -after admission, patient developed tachycardia and Tmax 100.8,no recurrence   -Procalcitonin level <0.05. Lactic acid 0.9. Blood culture in process,negative thus far. UA ,negative. Would like to obtain culture from right axillary drain.  -Patient was started on empiric antibiotics with vancomycin, Levaquin and tobramycin. DC tobramycin, continue with vancomycin Levaquin. Infection is looking less likely  --CT abdomen pelvis no intra-abdominal evidence of infection. --Temp of 101 a.m. of 8/2, no recurrence. Continue to monitor. --Low-grade fever again on 8/4, sending some infectious work-up including pro Rik, culture from right axillarybreast drain. #Hx R breast cancer   #Metastatic melanoma  -Outpatient follow up ,may need CT PET scan    #Right axillary drain present PTA. She recently had right axillary lymph node node dissection and right breast implant explantation by Dr. Maxine Cage. --Drain does not look purulent. Sending culture as she is spiking low-grade intermittent fever     #HTN  -continue hctz, cardura and metoprolol     #Dyslipidemia  -continue pravastatin and zetia    #Hyponatremia likely due to hypovolemia: Improved with IV fluids. DVT prophylaxis: Full dose Lovenox  MPOA: Leon Morillo,   Code: Full  Disposition: SNF versus IPR     FUNCTIONAL STATUS PRIOR TO HOSPITALIZATION Ambulatory with Use of Assistive Devices     Hospital Problems  Date Reviewed: 7/28/2021        Codes Class Noted POA    Pulmonary emboli [facility]) ICD-10-CM: I26.99  ICD-9-CM: 415.19  7/28/2021 Unknown                Review of Systems:   A comprehensive review of systems was negative except for that written in the HPI. Vital Signs:    Last 24hrs VS reviewed since prior progress note.  Most recent are:  Visit Vitals  /77 (BP 1 Location: Left lower arm, BP Patient Position: At rest)   Pulse 81   Temp 99.1 °F (37.3 °C)   Resp 18   Ht 5' 3\" (1.6 m)   Wt 64.7 kg (142 lb 11.2 oz)   SpO2 95%   BMI 25.28 kg/m²         Intake/Output Summary (Last 24 hours) at 8/4/2021 1900  Last data filed at 8/3/2021 2320  Gross per 24 hour   Intake    Output 50 ml   Net -50 ml        Physical Examination:     I had a face to face encounter with this patient and independently examined them on8/4/2021 as outlined below:        Constitutional:  Alert and oriented x3. Not in distress. HEENT:  Atraumatic. Oral mucosa moist,. Non icteric sclera. No pallor. Resp:  CTA bilaterally. No wheezing/rhonchi/rales. No accessory muscle use   Chest Wall:  Right axillary drain in place draining serosanguineous fluid. CV:  Regular rhythm, normal rate, no murmurs, gallops, rubs    GI:  Soft, non distended, non tender. normoactive bowel sounds, no hepatosplenomegaly    :  No CVA or suprapubic tenderness    Musculoskeletal:  Trace peripheral edema, warm, 2+ pulses throughout    Neurologic:  Mental status:AAOx3,   Cranial nerves II-XII : WNL  Motor exam:Moves all extremities symmetrically              Data Review:    Review and/or order of clinical lab test  Review and/or order of tests in the radiology section of CPT  Review and/or order of tests in the medicine section of CPT      Labs:     Recent Labs     08/04/21  1250 08/02/21  0127   WBC 6.5 8.3   HGB 8.4* 8.9*   HCT 25.2* 26.2*    365     Recent Labs     08/04/21  1250 08/02/21  0127   * 131*   K 4.5 4.0   CL 98 97   CO2 29 30   BUN 13 12   CREA 0.62 0.74   * 113*   CA 8.9 9.0     Recent Labs     08/02/21  0127   ALT 24   AP 61   TBILI 0.4   TP 6.5   ALB 2.3*   GLOB 4.2*     No results for input(s): INR, PTP, APTT, INREXT, INREXT in the last 72 hours. No results for input(s): FE, TIBC, PSAT, FERR in the last 72 hours. Lab Results   Component Value Date/Time    Folate >24.0 (H) 01/09/2010 03:15 AM      No results for input(s): PH, PCO2, PO2 in the last 72 hours. No results for input(s): CPK, CKNDX, TROIQ in the last 72 hours.     No lab exists for component: CPKMB  Lab Results   Component Value Date/Time    Cholesterol, total 150 05/07/2010 07:39 AM    HDL Cholesterol 41 05/07/2010 07:39 AM    LDL, calculated 72.2 05/07/2010 07:39 AM    Triglyceride 184 05/07/2010 07:39 AM    CHOL/HDL Ratio 3.7 05/07/2010 07:39 AM     Lab Results   Component Value Date/Time    Glucose (POC) 158 (H) 05/15/2021 09:50 PM    Glucose (POC) 109 (H) 03/10/2021 02:12 PM     Lab Results   Component Value Date/Time    Color YELLOW/STRAW 07/29/2021 04:44 PM    Appearance CLEAR 07/29/2021 04:44 PM    Specific gravity 1.021 07/29/2021 04:44 PM    Specific gravity <1.005 01/21/2011 04:45 AM    pH (UA) 5.5 07/29/2021 04:44 PM    Protein Negative 07/29/2021 04:44 PM    Glucose Negative 07/29/2021 04:44 PM    Ketone Negative 07/29/2021 04:44 PM    Bilirubin Negative 07/29/2021 04:44 PM    Urobilinogen 0.2 07/29/2021 04:44 PM    Nitrites Negative 07/29/2021 04:44 PM    Leukocyte Esterase Negative 07/29/2021 04:44 PM    Epithelial cells FEW 07/29/2021 04:44 PM    Bacteria Negative 07/29/2021 04:44 PM    WBC 0-4 07/29/2021 04:44 PM    RBC 0-5 07/29/2021 04:44 PM         Medications Reviewed:     Current Facility-Administered Medications   Medication Dose Route Frequency    ondansetron (ZOFRAN) injection 2 mg  2 mg IntraVENous Q4H PRN    pantoprazole (PROTONIX) tablet 40 mg  40 mg Oral ACB    calcium carbonate (TUMS) chewable tablet 200 mg [elemental]  200 mg Oral TID WITH MEALS    sodium chloride (NS) flush 5-10 mL  5-10 mL IntraVENous PRN    enoxaparin (LOVENOX) injection 60 mg  1 mg/kg SubCUTAneous Q12H    oxyCODONE-acetaminophen (PERCOCET) 5-325 mg per tablet 1 Tablet  1 Tablet Oral Q4H PRN    acetaminophen (TYLENOL) tablet 650 mg  650 mg Oral Q6H PRN    Or    acetaminophen (TYLENOL) suppository 650 mg  650 mg Rectal Q6H PRN    ezetimibe (ZETIA) tablet 10 mg  10 mg Oral DAILY    doxazosin (CARDURA) tablet 2 mg  2 mg Oral DAILY    [Held by provider] hydroCHLOROthiazide (HYDRODIURIL) tablet 25 mg  25 mg Oral DAILY    metoprolol tartrate (LOPRESSOR) tablet 50 mg  50 mg Oral BID    aspirin delayed-release tablet 81 mg  81 mg Oral DAILY    pravastatin (PRAVACHOL) tablet 20 mg  20 mg Oral QHS     ______________________________________________________________________  EXPECTED LENGTH OF STAY: 4d 2h  ACTUAL LENGTH OF STAY:          7                 Alexis Shin MD

## 2021-08-04 NOTE — PROGRESS NOTES
Problem: Mobility Impaired (Adult and Pediatric)  Goal: *Acute Goals and Plan of Care (Insert Text)  Description: FUNCTIONAL STATUS PRIOR TO ADMISSION: Patient was modified independent using a walker for functional mobility. HOME SUPPORT PRIOR TO ADMISSION: The patient lived with spouse but did not require assist.    Physical Therapy Goals  Initiated 7/30/2021  1. Patient will move from supine to sit and sit to supine  and roll side to side in bed with minimal assistance/contact guard assist within 7 day(s). 2.  Patient will transfer from bed to chair and chair to bed with minimal assistance  using the least restrictive device within 7 day(s). 3.  Patient will perform sit to stand with minimal assistance  within 7 day(s). 4.  Patient will ambulate with minimal assistance/contact guard assist for 75 feet with the least restrictive device within 7 day(s). 5.  Patient will ascend/descend 3 stairs with 1 handrail(s) with minimal assistance/contact guard assist within 7 day(s). Outcome: Progressing Towards Goal   PHYSICAL THERAPY TREATMENT  Patient: Moncho Tiwari (89 y.o. female)  Date: 8/4/2021  Diagnosis: Pulmonary emboli (HCC) [I26.99] <principal problem not specified>       Precautions: Fall  Chart, physical therapy assessment, plan of care and goals were reviewed. ASSESSMENT  Patient continues with skilled PT services and is progressing towards goals. She demonstrates the need for decreased assistance to transition supine to sit provided VC and HOB elevated. She is provided Max A for initial scooting but is able to scoot forward herself once closer to EOB. Patient demonstrates fair sitting balance EOB provided one UE support. She performs sit<>stand with Mod A demonstrating altered COG with increased posterior lean and trunk flexion. Patient is able to stand pivot transfer with Mod A demonstrating decreased bilateral step length.      Current Level of Function Impacting Discharge (mobility/balance): Min- Mod A     Other factors to consider for discharge: medical stability, decreased balance, increased need for assistance, PLOF          PLAN :  Patient continues to benefit from skilled intervention to address the above impairments. Continue treatment per established plan of care. to address goals. Recommendation for discharge: (in order for the patient to meet his/her long term goals)  Therapy 3 hours per day 5-7 days per week    This discharge recommendation:  Has been made in collaboration with the attending provider and/or case management    IF patient discharges home will need the following DME: to be determined (TBD)       SUBJECTIVE:   Patient stated you cant touch under this arm.     OBJECTIVE DATA SUMMARY:   Critical Behavior:  Neurologic State: Alert  Orientation Level: Oriented X4  Cognition: Appropriate for age attention/concentration  Safety/Judgement: Fall prevention, Home safety  Functional Mobility Training:  Bed Mobility:     Supine to Sit: Moderate assistance;Bed Modified     Scooting: Moderate assistance        Transfers:  Sit to Stand: Moderate assistance  Stand to Sit: Minimum assistance        Bed to Chair: Moderate assistance                    Balance:  Sitting: With support; Impaired  Sitting - Static: Good (unsupported)  Sitting - Dynamic: Fair (occasional)  Standing: Impaired; With support  Standing - Static: Constant support; Fair  Standing - Dynamic : Constant support; Fair  Ambulation/Gait Training:                                                        Stairs: Therapeutic Exercises:     Pain Rating:      Activity Tolerance:   Good    After treatment patient left in no apparent distress:   Sitting in chair, Call bell within reach, and Caregiver / family present    COMMUNICATION/COLLABORATION:   The patients plan of care was discussed with: Registered nurse.      Samia Anglin, PT   Time Calculation: 29 mins

## 2021-08-04 NOTE — PROGRESS NOTES
Spiritual Care Assessment/Progress Note  [facility]      NAME: Edel Munson      MRN: [id number]  AGE: 78 y.o. SEX: female  Worship Affiliation: Jehovah's witness   Language: English     8/4/2021     Total Time (in minutes): 8     Spiritual Assessment begun in [facility] 6W1 [facility]TH[facility] through conversation with:         []Patient        [] Family    [] Friend(s)        Reason for Consult: Initial/Spiritual assessment, patient floor     Spiritual beliefs: (Please include comment if needed)     [] Identifies with a yudelka tradition:         [] Supported by a yudelka community:            [] Claims no spiritual orientation:           [] Seeking spiritual identity:                [] Adheres to an individual form of spirituality:           [x] Not able to assess:                           Identified resources for coping:      [] Prayer                               [] Music                  [] Guided Imagery     [] Family/friends                 [] Pet visits     [] Devotional reading                         [x] Unknown     [] Other:                                             Interventions offered during this visit: (See comments for more details)    Patient Interventions: Spiritual care volunteer support           Plan of Care:     [] Support spiritual and/or cultural needs    [] Support AMD and/or advance care planning process      [] Support grieving process   [] Coordinate Rites and/or Rituals    [] Coordination with community clergy   [] No spiritual needs identified at this time   [] Detailed Plan of Care below (See Comments)  [] Make referral to Music Therapy  [] Make referral to Pet Therapy     [] Make referral to Addiction services  [] Make referral to [facility]  [] Make referral to Spiritual Care Partner  [] No future visits requested        [x] Follow up upon further referrals     Comments:  visit for initial spiritual assessment. Patient appears to be resting, door closed for privacy.   Did not wish to disturb so she can continue to rest.  Please contact spiritual care for further referral or consult. Rev.  Wilber Dimas MDiv, [facility], [facility]   paging service: [phone])

## 2021-08-05 NOTE — PROGRESS NOTES
ANDRE:    RUR 14%    Disposition: TBD- Therapy recommending IPR. [employer] denied [facility] IPR today. Peer to Peer can be done before 5pm tomorrow. The number to call is [phone], Reference #[id number]    CM notified MD via [address] [place] [address].     Transportation: [facility]    Primary Contact: Leona Ellington(spouse)[phone]    Ayanna Blanchard RN/CRM  [phone]

## 2021-08-05 NOTE — PROGRESS NOTES
NUTRITION     Nutrition screening completed for length of stay. Pt was admitted on 7/28 with a PE. She is on a regular diet. The patient's chart was reviewed and nutrition assessment is not indicated at this time. Plan to see patient for rescreen as indicated. Thank you. Wt Readings from Last 15 Encounters:   08/02/21 64.7 kg (142 lb 11.2 oz)   07/28/21 56.7 kg (125 lb)   07/16/21 62.1 kg (136 lb 14.5 oz)   07/09/21 62.1 kg (136 lb 14.5 oz)   06/23/21 60.3 kg (133 lb)   05/21/21 71.2 kg (156 lb 15.5 oz)   03/24/21 63 kg (139 lb)   03/10/21 61.2 kg (135 lb)   11/04/20 60.3 kg (133 lb)   10/23/20 61.2 kg (135 lb)   10/21/20 61.7 kg (136 lb)   10/12/20 61.7 kg (136 lb 0.4 oz)   09/23/20 61.9 kg (136 lb 6.4 oz)   07/14/20 62.1 kg (137 lb)   11/17/19 62.1 kg (137 lb)     Meal Intake:   Patient Vitals for the past 168 hrs:   % Diet Eaten   08/01/21 1855 1 - 25%   07/31/21 1948 1 - 25%     Supplement Intake:  No data found.     Kenia Penaloza RD, CSP  Contact via [employer]

## 2021-08-05 NOTE — PROGRESS NOTES
0442 pt am meds held due to pt complaining of nausea. PRN med given.     1100 CMP sent to lab    1141 pt  is at bedside concerned that wife appears to be more sleepy than normal. Attending paged per pts  request.

## 2021-08-05 NOTE — PROGRESS NOTES
[facility] Adult  Hospitalist Group                                                                                          Hospitalist Progress Note  Melissa Cox MD  Answering service: [phone] OR [phone] from in house phone        Date of Service:  2021  NAME:  Orvis Aschoff  :  1940  MRN:  [id number]    This documentation was facilitated by a Voice Recognition software and may contain inadvertent typographical errors. Admission Summary:   Orvis Aschoff is a 78 y.o. female with right breast cancer s/p R axillary lymph node dissection and excision of right breast implant for adenopathy and mass on  with path + for metastatic melanoma, GERD, dyslipidemia, HTN, TIA and hx malignant melanoma who presents with dyspnea and pleuritic chest pain for the past several days. Interval history / Subjective:   2021 :    Comfortable, eating    No new events   Agreeable to IPR , as recommended by PT    Will f/u with [facility] on discharge for her melanoma   Ermelinda Flatten as below        Assessment & Plan:   Nausea and vomiting, suspect gastritis, resolved. --Antiemetics,antiacids. --CT A/P unremarkable for any acute processes. Acute pulmonary emboli  --CT of the lung showed small burden of bilateral acute versus subacute pleural embolism, new multiple subcentimeter trach right lung nodules which may represent metastatic disease.  -Hemodynamically stable, no oxygen requirement, no right heart strain  -Continue Lovenox 1 mg/kg every 12 hourly. Lovenox is preferred anticoagulants in the setting of cancer (metastatic melanoma) however patient is not inclined to continue with injections, so patient will probably for be discharged on NOACs. -- Hematology/oncology consulted, recommended Xarlto on discharge. SIRS (fever and tachycardia) possible etiologies include acute PE, hypovolemia. No definitive source of infection. Fever did not recur since . No infection/sepsis. Antibiotics discontinued on 8/1  -after admission, patient developed tachycardia and Tmax 100.8,no recurrence   -Procalcitonin level <0.05. Lactic acid 0.9. Blood culture in process,negative thus far. UA ,negative. Would like to obtain culture from right axillary drain.  -Patient was started on empiric antibiotics with vancomycin, Levaquin and tobramycin. DC tobramycin, continue with vancomycin Levaquin. Infection is looking less likely  --CT abdomen pelvis no intra-abdominal evidence of infection. --Temp of 101 a.m. of 8/2, no recurrence. Continue to monitor. --Low-grade fever again on 8/4, sending some infectious work-up including pro Rik, culture from right axillarybreast drain. #Hx R breast cancer   #Metastatic melanoma  -Outpatient follow up ,may need CT PET scan    #Right axillary drain present PTA. She recently had right axillary lymph node node dissection and right breast implant explantation by Dr. Corry Norton. --Drain does not look purulent. Sending culture as she is spiking low-grade intermittent fever  - stable on exam 8/5/2021      #HTN  -continue hctz, cardura and metoprolol  BP Readings from Last 1 Encounters:   08/05/21 (!) 144/80         #Dyslipidemia  -continue pravastatin and zetia    #Hyponatremia likely due to hypovolemia: Improved with IV fluids. - last 130 S Na. 8/5/2021       DVT prophylaxis: Full dose Lovenox  MPOA: Lilliam Peterson,   Code: Full  Disposition: SNF versus IPR     FUNCTIONAL STATUS PRIOR TO HOSPITALIZATION Ambulatory with Use of [address] Problems  Date Reviewed: 7/28/2021        Codes Class Noted POA    Pulmonary emboli [facility]) ICD-10-CM: I26.99  ICD-9-CM: 415.19  7/28/2021 Unknown                After personally interviewing pt at bedside the following is noted on 8/5/2021 :    Review of Systems   Constitutional: Negative. Cardiovascular: Negative. Gastrointestinal: Negative. All other systems reviewed and are negative. I had a face to face encounter with patient on 8/5/2021 at bedside for the following physical exam:     PHYSICAL EXAM:    Visit Vitals  BP (!) 144/80 (BP 1 Location: Left upper arm, BP Patient Position: Lying)   Pulse (!) 117   Temp (!) 101.2 °F (38.4 °C)   Resp 18   Ht 5' 3\" (1.6 m)   Wt 64.7 kg (142 lb 11.2 oz)   SpO2 93%   BMI 25.28 kg/m²          Physical Exam  Constitutional:       General: She is not in acute distress. Appearance: Normal appearance. She is not ill-appearing. HENT:      Head: Normocephalic and atraumatic. Right Ear: External ear normal.      Left Ear: External ear normal.      Nose: Nose normal.      Mouth/Throat:      Mouth: Mucous membranes are moist.      Pharynx: Oropharynx is clear. Eyes:      Extraocular Movements: Extraocular movements intact. Conjunctiva/sclera: Conjunctivae normal.      Pupils: Pupils are equal, round, and reactive to light. Cardiovascular:      Rate and Rhythm: Normal rate and regular rhythm. Heart sounds: No murmur heard. No gallop. Pulmonary:      Effort: Pulmonary effort is normal.      Breath sounds: Normal breath sounds. Abdominal:      General: Abdomen is flat. Bowel sounds are normal.      Palpations: Abdomen is soft. Musculoskeletal:         General: No swelling or deformity. Cervical back: Normal range of motion and neck supple. Skin:     Coloration: Skin is not jaundiced or pale. Neurological:      General: No focal deficit present. Mental Status: She is alert and oriented to person, place, and time. Psychiatric:         Mood and Affect: Mood normal.         Thought Content:  Thought content normal.                           Data Review:    Review and/or order of clinical lab test  Review and/or order of tests in the radiology section of CPT  Review and/or order of tests in the medicine section of CPT      Labs:     Recent Labs     08/04/21  1250   WBC 6.5   HGB 8.4*   HCT 25.2*        Recent Labs 08/05/21  0423 08/04/21  1250   * 132*   K 4.0 4.5   CL 97 98   CO2 28 29   BUN 12 13   CREA 0.65 0.62   * 112*   CA 8.9 8.9     No results for input(s): ALT, AP, TBIL, TBILI, TP, ALB, GLOB, GGT, AML, LPSE in the last 72 hours. No lab exists for component: SGOT, GPT, AMYP, HLPSE  No results for input(s): INR, PTP, APTT, INREXT, INREXT in the last 72 hours. No results for input(s): FE, TIBC, PSAT, FERR in the last 72 hours. Lab Results   Component Value Date/Time    Folate >24.0 (H) 01/09/2010 03:15 AM      No results for input(s): PH, PCO2, PO2 in the last 72 hours. No results for input(s): CPK, CKNDX, TROIQ in the last 72 hours.     No lab exists for component: CPKMB  Lab Results   Component Value Date/Time    Cholesterol, total 150 05/07/2010 07:39 AM    HDL Cholesterol 41 05/07/2010 07:39 AM    LDL, calculated 72.2 05/07/2010 07:39 AM    Triglyceride 184 05/07/2010 07:39 AM    CHOL/HDL Ratio 3.7 05/07/2010 07:39 AM     Lab Results   Component Value Date/Time    Glucose (POC) 158 (H) 05/15/2021 09:50 PM    Glucose (POC) 109 (H) 03/10/2021 02:12 PM     Lab Results   Component Value Date/Time    Color YELLOW/STRAW 07/29/2021 04:44 PM    Appearance CLEAR 07/29/2021 04:44 PM    Specific gravity 1.021 07/29/2021 04:44 PM    Specific gravity <1.005 01/21/2011 04:45 AM    pH (UA) 5.5 07/29/2021 04:44 PM    Protein Negative 07/29/2021 04:44 PM    Glucose Negative 07/29/2021 04:44 PM    Ketone Negative 07/29/2021 04:44 PM    Bilirubin Negative 07/29/2021 04:44 PM    Urobilinogen 0.2 07/29/2021 04:44 PM    Nitrites Negative 07/29/2021 04:44 PM    Leukocyte Esterase Negative 07/29/2021 04:44 PM    Epithelial cells FEW 07/29/2021 04:44 PM    Bacteria Negative 07/29/2021 04:44 PM    WBC 0-4 07/29/2021 04:44 PM    RBC 0-5 07/29/2021 04:44 PM         Medications Reviewed:     Current Facility-Administered Medications   Medication Dose Route Frequency    ondansetron (ZOFRAN) injection 2 mg  2 mg IntraVENous Q4H PRN    pantoprazole (PROTONIX) tablet 40 mg  40 mg Oral ACB    calcium carbonate (TUMS) chewable tablet 200 mg [elemental]  200 mg Oral TID WITH MEALS    sodium chloride (NS) flush 5-10 mL  5-10 mL IntraVENous PRN    enoxaparin (LOVENOX) injection 60 mg  1 mg/kg SubCUTAneous Q12H    oxyCODONE-acetaminophen (PERCOCET) 5-325 mg per tablet 1 Tablet  1 Tablet Oral Q4H PRN    acetaminophen (TYLENOL) tablet 650 mg  650 mg Oral Q6H PRN    Or    acetaminophen (TYLENOL) suppository 650 mg  650 mg Rectal Q6H PRN    ezetimibe (ZETIA) tablet 10 mg  10 mg Oral DAILY    doxazosin (CARDURA) tablet 2 mg  2 mg Oral DAILY    [Held by provider] hydroCHLOROthiazide (HYDRODIURIL) tablet 25 mg  25 mg Oral DAILY    metoprolol tartrate (LOPRESSOR) tablet 50 mg  50 mg Oral BID    aspirin delayed-release tablet 81 mg  81 mg Oral DAILY    pravastatin (PRAVACHOL) tablet 20 mg  20 mg Oral QHS     ______________________________________________________________________  EXPECTED LENGTH OF STAY: 4d 2h  ACTUAL LENGTH OF STAY:          8                 Maru Pride MD

## 2021-08-05 NOTE — PROGRESS NOTES
Problem: Mobility Impaired (Adult and Pediatric)  Goal: *Acute Goals and Plan of Care (Insert Text)  Description: FUNCTIONAL STATUS PRIOR TO ADMISSION: Patient was modified independent using a walker for functional mobility. HOME SUPPORT PRIOR TO ADMISSION: The patient lived with spouse but did not require assist.    Physical Therapy Goals  Initiated 7/30/2021  1. Patient will move from supine to sit and sit to supine  and roll side to side in bed with minimal assistance/contact guard assist within 7 day(s). 2.  Patient will transfer from bed to chair and chair to bed with minimal assistance  using the least restrictive device within 7 day(s). 3.  Patient will perform sit to stand with minimal assistance  within 7 day(s). 4.  Patient will ambulate with minimal assistance/contact guard assist for 75 feet with the least restrictive device within 7 day(s). 5.  Patient will ascend/descend 3 stairs with 1 handrail(s) with minimal assistance/contact guard assist within 7 day(s). Outcome: Progressing Towards Goal   PHYSICAL THERAPY TREATMENT  Patient: Violette Guzman (84 y.o. female)  Date: 8/5/2021  Diagnosis: Pulmonary emboli (HCC) [I26.99] <principal problem not specified>       Precautions: Fall  Chart, physical therapy assessment, plan of care and goals were reviewed. ASSESSMENT  Patient continues with skilled PT services and is progressing towards goals. She appears more lethargic and weak today requiring increased assistance for all functional mobility. Patient demonstrates a significant R lean today requiring VC, tactile cues and physical assistance to achieve midline. She is unable to maintain midline today with Max A. Patient performs transfers with increased assistance requiring support for increased posterior lean, assistance for RW management, and assistance with weight shifting in order to increase BRENT and balance. She is left in bedside chair with all needs met.  Pillows are positioned in order to improve R lateral lean. Patient also demonstrate increased difficulty maintain her head and neck in extension requiring VC to look up. Prior to admission patient was mod I with a RW. She currently demonstrates the inability to perform functional mobility without assistance including supine to sit. Once in sitting patient is requiring increased assistance in order to achieve balance. She continues to report increased pain and swelling in the R UE limiting her ability to use it. Patient is unable to transfer at this time without at least Min A x2. She demonstrates decreased standing balance and requires assistance to negotiate an assistive device she is familiar with. Current Level of Function Impacting Discharge (mobility/balance): Mod Ax2     Other factors to consider for discharge: medical stability, PLOF, increased need for assistance, increased risk for falls         PLAN :  Patient continues to benefit from skilled intervention to address the above impairments. Continue treatment per established plan of care. to address goals. Recommendation for discharge: (in order for the patient to meet his/her long term goals)  Therapy 3 hours per day 5-7 days per week    This discharge recommendation:  Has been made in collaboration with the attending provider and/or case management    IF patient discharges home will need the following DME: hospital bed, belinda lift, wheelchair        SUBJECTIVE:   Patient stated Micheline Lockwood just don't have the energy to pick it up.  -- in reference to her R UE     OBJECTIVE DATA SUMMARY:   Critical Behavior:  Neurologic State: Eyes open to voice  Orientation Level: Oriented to situation, Oriented to place, Oriented to person, Disoriented to time  Cognition: Follows commands  Safety/Judgement: Fall prevention, Home safety  Functional Mobility Training:  Bed Mobility:     Supine to Sit: Bed Modified; Moderate assistance     Scooting: Maximum assistance        Transfers:  Sit to Stand: Minimum assistance;Assist x2  Stand to Sit: Minimum assistance;Assist x2        Bed to Chair: Moderate assistance;Assist x2                    Balance:  Sitting: Impaired; With support  Sitting - Static: Poor (constant support)  Sitting - Dynamic: Poor (constant support)  Standing: Impaired; With support  Standing - Static: Constant support; Fair  Standing - Dynamic : Constant support;Poor  Ambulation/Gait Training:  Distance (ft): 8 Feet (ft)  Assistive Device: Gait belt;Walker, rolling           Gait Abnormalities: Decreased step clearance; Step to gait        Base of Support: Center of gravity altered;Narrowed     Speed/Denae: Slow;Shuffled  Step Length: Right shortened;Left shortened                    Stairs: Therapeutic Exercises:     Pain Rating:      Activity Tolerance:   Fair    After treatment patient left in no apparent distress:   Sitting in chair and Call bell within reach    COMMUNICATION/COLLABORATION:   The patients plan of care was discussed with: Occupational therapist and Registered nurse.      Pedro Mejia, PT   Time Calculation: 30 mins

## 2021-08-05 NOTE — PROGRESS NOTES
Problem: Self Care Deficits Care Plan (Adult)  Goal: *Acute Goals and Plan of Care (Insert Text)  Description:   FUNCTIONAL STATUS PRIOR TO ADMISSION: Patient was modified independent using a rolling walker for functional mobility. Recent rehab at SNF(approx one month ago)    HOME SUPPORT: The patient lived with . Occupational Therapy Goals  Initiated 8/2/2021  1. Patient will perform UB bathing and dressing with supervision/set-up within 7 day(s). 2.  Patient will perform lower body dressing with AD with moderate assistance  within 7 day(s). 3.  Patient will perform sit <> stand to participate with standing ADL tasks with minimal assistance within 7 day(s). 4.  Patient will perform toilet transfers with moderate assistance at  level to [facility] within 7 day(s). 5.  Patient will perform all aspects of toileting with moderate assistance  within 7 day(s). 6.  Patient will participate in upper extremity therapeutic exercise/activities through comfortable AROM with supervision/set-up for 8 minutes within 7 day(s). Outcome: Progressing Towards Goal   OCCUPATIONAL THERAPY TREATMENT  Patient: Elba Benoit (18 y.o. female)  Date: 8/5/2021  Diagnosis: Pulmonary emboli ([facility] [address] [I26.99] <principal problem not specified>       Precautions: Fall  Chart, occupational therapy assessment, plan of care, and goals were reviewed. ASSESSMENT  Patient continues with skilled OT services and is progressing towards goals. Patient tolerated session fair, received supine in bed, amenable to therapy session. Patient with decreased balance, coordination, and ability to complete ADL/transfers today without significant assistance. Throughout session with significant R lateral lean requiring max verbal/tactile cues to return to midline posture. Completed transfers with increased assistance d/t lateral and posterior lean, requiring assist with RW maipulation and side stepping to bedside commode and then bedside chair. Patient attempted to void on [facility] however unable, don/doffed brief with total A. Prior to admission patient was mod I with ADL/IADL and currently is presenting far below baseline. Patient will require rehab upon discharge to ensure safe return to PLOF as patient is high fall risk and risk for further debility at this time. Left in bedside chair, with all needs in reach and in NAD. Will continue to benefit from OT services and progress as able. Current Level of Function Impacting Discharge (ADLs): up to Max A ADL    Other factors to consider for discharge: significant assist for transfers and ADL today         PLAN :  Patient continues to benefit from skilled intervention to address the above impairments. Continue treatment per established plan of care to address goals. Recommend with staff: OOB 3x daily for meals, functional mobility to bathroom    Recommend next OT session: OOB ADL    Recommendation for discharge: (in order for the patient to meet his/her long term goals)  Therapy 3 hours per day 5-7 days per week vs SNF    This discharge recommendation:  Has been made in collaboration with the attending provider and/or case management    IF patient discharges home will need the following DME: TBD pending progress       SUBJECTIVE:   Patient stated it's just hard today.     OBJECTIVE DATA SUMMARY:   Cognitive/Behavioral Status:  Neurologic State: Eyes open to voice  Orientation Level: Oriented to situation;Oriented to place;Oriented to person;Disoriented to time  Cognition: Follows commands             Functional Mobility and Transfers for ADLs:  Bed Mobility:  Supine to Sit: Bed Modified; Moderate assistance  Scooting: Maximum assistance    Transfers:  Sit to Stand: Minimum assistance;Assist x2  Functional Transfers  Toilet Transfer : Maximum assistance;Assist x2  Bed to Chair: Moderate assistance;Assist x2    Balance:  Sitting: Impaired; With support  Sitting - Static: Poor (constant support)  Sitting - Dynamic: Poor (constant support)  Standing: Impaired; With support  Standing - Static: Constant support; Fair  Standing - Dynamic : Constant support;Poor    ADL Intervention:                           Lower Body Dressing Assistance  Protective Undergarmet: Maximum assistance  Position Performed: Standing  Cues: Verbal cues provided;Physical assistance    Toileting  Toileting Assistance: Maximum assistance  Bladder Hygiene: Maximum assistance  Clothing Management: Maximum assistance  Cues: Verbal cues provided;Physical assistance for pants up           Pain:  Pain in R arm with movement, did not quantify    Activity Tolerance:   tolerates ADLs without rest breaks and requires rest breaks    After treatment patient left in no apparent distress:   Sitting in chair and Call bell within reach    COMMUNICATION/COLLABORATION:   The patients plan of care was discussed with: Physical therapist, Registered nurse, and Case management.      Nicole Morin OT  Time Calculation: 30 mins

## 2021-08-06 NOTE — PROGRESS NOTES
Physical Therapy Note    PT Tx deferred. Patient with 102.1 fever this AM. Will reassess patient for medical stability this afternoon.      Pedro BURROWST

## 2021-08-06 NOTE — PROGRESS NOTES
ANDRE:    RUR 15%    Disposition: SNF rehab-[employer] denied IPR. Referral sent to [facility]. New [employer] auth started with [facility]. CM called [phone] and spoke to [facility]. Clinicals faxed to [phone]. Reference number [id number].     Transportation: Hospitals in [place]    Primary contact: Gi Soto [phone]    Follow up: PCP/Specialist    Maninder Guadalupe RN/CRM  [phone]

## 2021-08-06 NOTE — PROGRESS NOTES
Sorry to see her not thriving. Her YOLANDA drainage is not infected and is also not unexpected for the surgery she had.

## 2021-08-06 NOTE — PROGRESS NOTES
Occupational Therapy:    Chart reviewed, per latest vitals patient with 102.1 fever, will defer at this time and continue to follow up as medically able.       Mayank Jain MS, OTR/L

## 2021-08-06 NOTE — PROGRESS NOTES
[address] [facility] Adult  Hospitalist Group                                                                                          Hospitalist Progress Note  Meryle Lower, MD  Answering service: [phone] OR [phone] from in house phone        Date of Service:  2021  NAME:  Tanika Wallace  :  1940  MRN:  [id number]    This documentation was facilitated by a Voice Recognition software and may contain inadvertent typographical errors. Admission Summary:   Tanika Wallace is a 78 y.o. female with right breast cancer s/p R axillary lymph node dissection and excision of right breast implant for adenopathy and mass on  with path + for metastatic melanoma, GERD, dyslipidemia, HTN, TIA and hx malignant melanoma who presents with dyspnea and pleuritic chest pain for the past several days. Interval history / Subjective:   2021 :    Per , he is having to feed pt   Pt is very weak   For cXR and blood cult as t max 102>    Pt is not toxic however, last wbc only 6  Recently but  F/u lab for am         Assessment & Plan:   Nausea and vomiting, suspect gastritis, resolved. --Antiemetics,antiacids. --CT A/P unremarkable for any acute processes. Acute pulmonary emboli  --CT of the lung showed small burden of bilateral acute versus subacute pleural embolism, new multiple subcentimeter trach right lung nodules which may represent metastatic disease.  -Hemodynamically stable, no oxygen requirement, no right heart strain  -Continue Lovenox 1 mg/kg every 12 hourly. Lovenox is preferred anticoagulants in the setting of cancer (metastatic melanoma) however patient is not inclined to continue with injections, so patient will probably for be discharged on NOACs. -- Hematology/oncology consulted, recommended Xarlto on discharge. SIRS (fever and tachycardia) possible etiologies include acute PE, hypovolemia. No definitive source of infection. Fever did not recur since . No infection/sepsis. Antibiotics discontinued on 8/1  -after admission, patient developed tachycardia and Tmax 100.8,no recurrence   -Procalcitonin level <0.05. Lactic acid 0.9. Blood culture in process,negative thus far. UA ,negative. Would like to obtain culture from right axillary drain.  -Patient was started on empiric antibiotics with vancomycin, Levaquin and tobramycin. DC tobramycin, continue with vancomycin Levaquin. Infection is looking less likely  --CT abdomen pelvis no intra-abdominal evidence of infection. --Temp of 101 a.m. of 8/2, no recurrence. Continue to monitor. --Low-grade fever again on 8/4, sending some infectious work-up including pro Rik, culture from right axillarybreast drain. - t max 8/6/2021 : 102. For cxr and blood cult      #Hx R breast cancer   #Metastatic melanoma  -Outpatient follow up ,may need CT PET scan    #Right axillary drain present PTA. She recently had right axillary lymph node node dissection and right breast implant explantation by Dr. Manjinder Landrum. --Drain does not look purulent. Sending culture as she is spiking low-grade intermittent fever  - stable on exam 8/5/2021  Fever concerning 8/6/2021       #HTN  -continue hctz, cardura and metoprolol  BP Readings from Last 1 Encounters:   08/06/21 (!) 146/67         #Dyslipidemia  -continue pravastatin and zetia    #Hyponatremia likely due to hypovolemia: Improved with IV fluids. - last 130 S Na. 8/6/2021       DVT prophylaxis: Full dose Lovenox  MPOA: Tova Lull,   Code: Full  Disposition: SNF versus IPR     FUNCTIONAL STATUS PRIOR TO HOSPITALIZATION Ambulatory with Use of [address] Problems  Date Reviewed: 7/28/2021        Codes Class Noted POA    Pulmonary emboli [facility]) ICD-10-CM: I26.99  ICD-9-CM: 415.19  7/28/2021 Unknown                After personally interviewing pt at bedside the following is noted on 8/6/2021 :    Review of Systems   Constitutional: Negative.     Cardiovascular: Negative. Gastrointestinal: Negative. All other systems reviewed and are negative. I had a face to face encounter with patient on 8/6/2021 at bedside for the following physical exam:     PHYSICAL EXAM:    Visit Vitals  BP (!) 146/67 (BP 1 Location: Left upper arm, BP Patient Position: At rest)   Pulse 88   Temp (!) 102.1 °F (38.9 °C)   Resp 20   Ht 5' 3\" (1.6 m)   Wt 64.7 kg (142 lb 11.2 oz)   SpO2 91%   BMI 25.28 kg/m²          Physical Exam  Vitals reviewed. Constitutional:       General: She is not in acute distress. Appearance: Normal appearance. She is not ill-appearing. HENT:      Head: Normocephalic and atraumatic. Right Ear: External ear normal.      Left Ear: External ear normal.      Nose: Nose normal.      Mouth/Throat:      Mouth: Mucous membranes are moist.      Pharynx: Oropharynx is clear. Eyes:      Extraocular Movements: Extraocular movements intact. Conjunctiva/sclera: Conjunctivae normal.      Pupils: Pupils are equal, round, and reactive to light. Cardiovascular:      Rate and Rhythm: Normal rate and regular rhythm. Heart sounds: No murmur heard. No gallop. Pulmonary:      Effort: Pulmonary effort is normal.      Breath sounds: Normal breath sounds. Abdominal:      General: Abdomen is flat. Bowel sounds are normal.      Palpations: Abdomen is soft. Musculoskeletal:         General: No swelling or deformity. Cervical back: Normal range of motion and neck supple. Skin:     Coloration: Skin is not jaundiced or pale. Neurological:      General: No focal deficit present. Mental Status: She is alert and oriented to person, place, and time. Motor: Weakness present.    Psychiatric:         Mood and Affect: Mood normal.         Behavior: Behavior normal.                           Data Review:    Review and/or order of clinical lab test  Review and/or order of tests in the radiology section of CPT  Review and/or order of tests in the medicine section of [facility]      Labs:     Recent Labs     08/04/21  1250   WBC 6.5   HGB 8.4*   HCT 25.2*        Recent Labs     08/05/21  0423 08/04/21  1250   * 132*   K 4.0 4.5   CL 97 98   CO2 28 29   BUN 12 13   CREA 0.65 0.62   * 112*   CA 8.9 8.9     No results for input(s): ALT, AP, TBIL, TBILI, TP, ALB, GLOB, GGT, AML, LPSE in the last 72 hours. No lab exists for component: SGOT, GPT, AMYP, HLPSE  No results for input(s): INR, PTP, APTT, INREXT, INREXT in the last 72 hours. No results for input(s): FE, TIBC, PSAT, FERR in the last 72 hours. Lab Results   Component Value Date/Time    Folate >24.0 (H) 01/09/2010 03:15 AM      No results for input(s): PH, PCO2, PO2 in the last 72 hours. No results for input(s): CPK, CKNDX, TROIQ in the last 72 hours.     No lab exists for component: CPKMB  Lab Results   Component Value Date/Time    Cholesterol, total 150 05/07/2010 07:39 AM    HDL Cholesterol 41 05/07/2010 07:39 AM    LDL, calculated 72.2 05/07/2010 07:39 AM    Triglyceride 184 05/07/2010 07:39 AM    CHOL/HDL Ratio 3.7 05/07/2010 07:39 AM     Lab Results   Component Value Date/Time    Glucose (POC) 171 (H) 08/05/2021 12:18 PM    Glucose (POC) 158 (H) 05/15/2021 09:50 PM    Glucose (POC) 109 (H) 03/10/2021 02:12 PM     Lab Results   Component Value Date/Time    Color YELLOW/STRAW 07/29/2021 04:44 PM    Appearance CLEAR 07/29/2021 04:44 PM    Specific gravity 1.021 07/29/2021 04:44 PM    Specific gravity <1.005 01/21/2011 04:45 AM    pH (UA) 5.5 07/29/2021 04:44 PM    Protein Negative 07/29/2021 04:44 PM    Glucose Negative 07/29/2021 04:44 PM    Ketone Negative 07/29/2021 04:44 PM    Bilirubin Negative 07/29/2021 04:44 PM    Urobilinogen 0.2 07/29/2021 04:44 PM    Nitrites Negative 07/29/2021 04:44 PM    Leukocyte Esterase Negative 07/29/2021 04:44 PM    Epithelial cells FEW 07/29/2021 04:44 PM    Bacteria Negative 07/29/2021 04:44 PM    WBC 0-4 07/29/2021 04:44 PM    RBC 0-5 07/29/2021 04:44 PM         Medications Reviewed:     Current Facility-Administered Medications   Medication Dose Route Frequency    ondansetron (ZOFRAN) injection 2 mg  2 mg IntraVENous Q4H PRN    pantoprazole (PROTONIX) tablet 40 mg  40 mg Oral ACB    calcium carbonate (TUMS) chewable tablet 200 mg [elemental]  200 mg Oral TID WITH MEALS    sodium chloride (NS) flush 5-10 mL  5-10 mL IntraVENous PRN    enoxaparin (LOVENOX) injection 60 mg  1 mg/kg SubCUTAneous Q12H    oxyCODONE-acetaminophen (PERCOCET) 5-325 mg per tablet 1 Tablet  1 Tablet Oral Q4H PRN    acetaminophen (TYLENOL) tablet 650 mg  650 mg Oral Q6H PRN    Or    acetaminophen (TYLENOL) suppository 650 mg  650 mg Rectal Q6H PRN    ezetimibe (ZETIA) tablet 10 mg  10 mg Oral DAILY    doxazosin (CARDURA) tablet 2 mg  2 mg Oral DAILY    [Held by provider] hydroCHLOROthiazide (HYDRODIURIL) tablet 25 mg  25 mg Oral DAILY    metoprolol tartrate (LOPRESSOR) tablet 50 mg  50 mg Oral BID    aspirin delayed-release tablet 81 mg  81 mg Oral DAILY    pravastatin (PRAVACHOL) tablet 20 mg  20 mg Oral QHS     ______________________________________________________________________  EXPECTED LENGTH OF STAY: 4d 2h  ACTUAL LENGTH OF STAY:          9                 Micky Eli MD

## 2021-08-06 NOTE — PROGRESS NOTES
Problem: Falls - Risk of  Goal: *Absence of Falls  Description: Document Jaelyn Fajardo Fall Risk and appropriate interventions in the flowsheet. Outcome: Progressing Towards Goal  Note: Fall Risk Interventions:  Mobility Interventions: Communicate number of staff needed for ambulation/transfer         Medication Interventions: Evaluate medications/consider consulting pharmacy    Elimination Interventions: Toileting schedule/hourly rounds              Problem: Pressure Injury - Risk of  Goal: *Prevention of pressure injury  Description: Document Anmol Scale and appropriate interventions in the flowsheet.   Outcome: Progressing Towards Goal  Note: Pressure Injury Interventions:  Sensory Interventions: Assess changes in LOC    Moisture Interventions: Check for incontinence Q2 hours and as needed    Activity Interventions: PT/OT evaluation    Mobility Interventions: PT/OT evaluation    Nutrition Interventions: Offer support with meals,snacks and hydration    Friction and Shear Interventions: HOB 30 degrees or less, Lift sheet, Minimize layers

## 2021-08-07 NOTE — PROGRESS NOTES
Problem: Falls - Risk of  Goal: *Absence of Falls  Description: Document Kelly Ragland Fall Risk and appropriate interventions in the flowsheet. Outcome: Progressing Towards Goal  Note: Fall Risk Interventions:  Mobility Interventions: Communicate number of staff needed for ambulation/transfer         Medication Interventions: Evaluate medications/consider consulting pharmacy    Elimination Interventions: Toileting schedule/hourly rounds              Problem: Pressure Injury - Risk of  Goal: *Prevention of pressure injury  Description: Document Anmol Scale and appropriate interventions in the flowsheet.   Outcome: Progressing Towards Goal  Note: Pressure Injury Interventions:  Sensory Interventions: Assess changes in LOC    Moisture Interventions: Check for incontinence Q2 hours and as needed    Activity Interventions: PT/OT evaluation    Mobility Interventions: PT/OT evaluation    Nutrition Interventions: Offer support with meals,snacks and hydration    Friction and Shear Interventions: Apply protective barrier, creams and emollients

## 2021-08-07 NOTE — PROGRESS NOTES
Problem: Falls - Risk of  Goal: *Absence of Falls  Description: Document Kathia Faustin Fall Risk and appropriate interventions in the flowsheet. Outcome: Progressing Towards Goal  Note: Fall Risk Interventions:  Mobility Interventions: Communicate number of staff needed for ambulation/transfer         Medication Interventions: Evaluate medications/consider consulting pharmacy    Elimination Interventions: Toileting schedule/hourly rounds              Problem: Pressure Injury - Risk of  Goal: *Prevention of pressure injury  Description: Document Anmol Scale and appropriate interventions in the flowsheet.   Outcome: Progressing Towards Goal  Note: Pressure Injury Interventions:  Sensory Interventions: Assess changes in LOC    Moisture Interventions: Check for incontinence Q2 hours and as needed    Activity Interventions: PT/OT evaluation    Mobility Interventions: PT/OT evaluation    Nutrition Interventions: Offer support with meals,snacks and hydration    Friction and Shear Interventions: HOB 30 degrees or less, Lift sheet, Minimize layers

## 2021-08-07 NOTE — PROGRESS NOTES
1915: Spoke to hospitalist [employer]. Performed a bladder scan on patient. Scan showed 464 ml. Straight catheter performed and output was 400 ml. Urinalysis sent to lab per hospitalist.    1925: Patient comfortable. Pressure and pain in belly subsided.

## 2021-08-07 NOTE — PROGRESS NOTES
[address] [facility] Adult  Hospitalist Group                                                                                          Hospitalist Progress Note  Geena Erwin MD  Answering service: [phone] or [phone] from in house phone        Date of Service:  2021  NAME:  Norma Apple  :  1940  MRN:  [id number]    This documentation was facilitated by a Voice Recognition software and may contain inadvertent typographical errors. Admission Summary:   Norma Apple is a 78 y.o. female with right breast cancer s/p R axillary lymph node dissection and excision of right breast implant for adenopathy and mass on  with path + for metastatic melanoma, GERD, dyslipidemia, HTN, TIA and hx malignant melanoma who presents with dyspnea and pleuritic chest pain for the past several days. Interval history / Subjective:   2021 :    No new issues.  For SNF placement   Needs total assist for all ADL's not an IPR candidate   Goals of care need researching in light of above - will arrange family meeting; discuss with onc of record on Monday         Assessment & Plan:   Nausea and vomiting, suspect gastritis, resolved. --Antiemetics,antiacids. --CT A/P unremarkable for any acute processes. Acute pulmonary emboli  --CT of the lung showed small burden of bilateral acute versus subacute pleural embolism, new multiple subcentimeter trach right lung nodules which may represent metastatic disease.  -Hemodynamically stable, no oxygen requirement, no right heart strain  -Continue Lovenox 1 mg/kg every 12 hourly. Lovenox is preferred anticoagulants in the setting of cancer (metastatic melanoma) however patient is not inclined to continue with injections, so patient will probably for be discharged on NOACs. -- Hematology/oncology consulted, recommended Xarlto on discharge. SIRS (fever and tachycardia) possible etiologies include acute PE, hypovolemia.   No definitive source of infection. Fever did not recur since 7/29. No infection/sepsis. Antibiotics discontinued on 8/1  -after admission, patient developed tachycardia and Tmax 100.8,no recurrence   -Procalcitonin level <0.05. Lactic acid 0.9. Blood culture in process,negative thus far. UA ,negative. Would like to obtain culture from right axillary drain.  -Patient was started on empiric antibiotics with vancomycin, Levaquin and tobramycin. DC tobramycin, continue with vancomycin Levaquin. Infection is looking less likely  --CT abdomen pelvis no intra-abdominal evidence of infection. --Temp of 101 a.m. of 8/2, no recurrence. Continue to monitor. --Low-grade fever again on 8/4, sending some infectious work-up including pro Rik, culture from right axillarybreast drain. - t max 8/7/2021 : 102. For cxr and blood cult      #Hx R breast cancer   #Metastatic melanoma  -Outpatient follow up ,may need CT PET scan    #Right axillary drain present PTA. She recently had right axillary lymph node node dissection and right breast implant explantation by Dr. Leonora Cisneros. --Drain does not look purulent. Sending culture as she is spiking low-grade intermittent fever  - stable on exam 8/5/2021  Fever concerning 8/6/2021       #HTN  -continue hctz, cardura and metoprolol  BP Readings from Last 1 Encounters:   08/07/21 116/70         #Dyslipidemia  -continue pravastatin and zetia    #Hyponatremia likely due to hypovolemia: Improved with IV fluids.    - last 130 S Na. 8/7/2021       DVT prophylaxis: Full dose Lovenox  MPOA: Omaira Ring,   Code: Full  Disposition: SNF versus IPR     FUNCTIONAL STATUS PRIOR TO HOSPITALIZATION Ambulatory with Use of [address] [place] 349 Problems  Date Reviewed: 7/28/2021        Codes Class Noted POA    Pulmonary emboli [facility]) ICD-10-CM: I26.99  ICD-9-CM: 415.19  7/28/2021 Unknown                After personally interviewing pt at bedside the following is noted on 8/7/2021 :    Review of Systems Constitutional: Negative. Cardiovascular: Negative. Gastrointestinal: Negative. Psychiatric/Behavioral: Negative. But appears depressed   All other systems reviewed and are negative. I had a face to face encounter with patient on 8/7/2021 at bedside for the following physical exam:     PHYSICAL EXAM:    Visit Vitals  /70 (BP 1 Location: Left upper arm, BP Patient Position: At rest)   Pulse 76   Temp 98 °F (36.7 °C)   Resp 16   Ht 5' 3\" (1.6 m)   Wt 64.7 kg (142 lb 11.2 oz)   SpO2 93%   BMI 25.28 kg/m²          Physical Exam  Vitals reviewed. Constitutional:       General: She is not in acute distress. Appearance: Normal appearance. She is not ill-appearing. HENT:      Head: Normocephalic and atraumatic. Right Ear: External ear normal.      Left Ear: External ear normal.      Nose: Nose normal.      Mouth/Throat:      Mouth: Mucous membranes are moist.      Pharynx: Oropharynx is clear. Eyes:      Extraocular Movements: Extraocular movements intact. Conjunctiva/sclera: Conjunctivae normal.      Pupils: Pupils are equal, round, and reactive to light. Cardiovascular:      Rate and Rhythm: Normal rate and regular rhythm. Heart sounds: No murmur heard. No gallop. Pulmonary:      Effort: Pulmonary effort is normal.      Breath sounds: Normal breath sounds. Abdominal:      General: Abdomen is flat. Bowel sounds are normal.      Palpations: Abdomen is soft. Musculoskeletal:         General: No swelling or deformity. Cervical back: Normal range of motion and neck supple. Skin:     Coloration: Skin is not jaundiced or pale. Neurological:      General: No focal deficit present. Mental Status: She is alert and oriented to person, place, and time. Motor: Weakness present.    Psychiatric:         Behavior: Behavior normal.      Comments: Seems depressed, pt denies                            Data Review:    Review and/or order of clinical lab test  Review and/or order of tests in the radiology section of CPT  Review and/or order of tests in the medicine section of CPT      Labs:     Recent Labs     08/07/21  0233   WBC 9.0   HGB 8.2*   HCT 24.2*        Recent Labs     08/07/21 0233 08/05/21  0423   * 130*   K 4.2 4.0   CL 96* 97   CO2 27 28   BUN 17 12   CREA 0.56 0.65   * 159*   CA 8.7 8.9     Recent Labs     08/07/21  0233   ALT 30   AP 66   TBILI 0.4   TP 6.4   ALB 2.0*   GLOB 4.4*     No results for input(s): INR, PTP, APTT, INREXT, INREXT in the last 72 hours. No results for input(s): FE, TIBC, PSAT, FERR in the last 72 hours. Lab Results   Component Value Date/Time    Folate >24.0 (H) 01/09/2010 03:15 AM      No results for input(s): PH, PCO2, PO2 in the last 72 hours. No results for input(s): CPK, CKNDX, TROIQ in the last 72 hours.     No lab exists for component: CPKMB  Lab Results   Component Value Date/Time    Cholesterol, total 150 05/07/2010 07:39 AM    HDL Cholesterol 41 05/07/2010 07:39 AM    LDL, calculated 72.2 05/07/2010 07:39 AM    Triglyceride 184 05/07/2010 07:39 AM    CHOL/HDL Ratio 3.7 05/07/2010 07:39 AM     Lab Results   Component Value Date/Time    Glucose (POC) 171 (H) 08/05/2021 12:18 PM    Glucose (POC) 158 (H) 05/15/2021 09:50 PM    Glucose (POC) 109 (H) 03/10/2021 02:12 PM     Lab Results   Component Value Date/Time    Color YELLOW/STRAW 07/29/2021 04:44 PM    Appearance CLEAR 07/29/2021 04:44 PM    Specific gravity 1.021 07/29/2021 04:44 PM    Specific gravity <1.005 01/21/2011 04:45 AM    pH (UA) 5.5 07/29/2021 04:44 PM    Protein Negative 07/29/2021 04:44 PM    Glucose Negative 07/29/2021 04:44 PM    Ketone Negative 07/29/2021 04:44 PM    Bilirubin Negative 07/29/2021 04:44 PM    Urobilinogen 0.2 07/29/2021 04:44 PM    Nitrites Negative 07/29/2021 04:44 PM    Leukocyte Esterase Negative 07/29/2021 04:44 PM    Epithelial cells FEW 07/29/2021 04:44 PM    Bacteria Negative 07/29/2021 04:44 PM WBC 0-4 07/29/2021 04:44 PM    RBC 0-5 07/29/2021 04:44 PM         Medications Reviewed:     Current Facility-Administered Medications   Medication Dose Route Frequency    ondansetron (ZOFRAN) injection 2 mg  2 mg IntraVENous Q4H PRN    pantoprazole (PROTONIX) tablet 40 mg  40 mg Oral ACB    calcium carbonate (TUMS) chewable tablet 200 mg [elemental]  200 mg Oral TID WITH MEALS    sodium chloride (NS) flush 5-10 mL  5-10 mL IntraVENous PRN    enoxaparin (LOVENOX) injection 60 mg  1 mg/kg SubCUTAneous Q12H    oxyCODONE-acetaminophen (PERCOCET) 5-325 mg per tablet 1 Tablet  1 Tablet Oral Q4H PRN    acetaminophen (TYLENOL) tablet 650 mg  650 mg Oral Q6H PRN    Or    acetaminophen (TYLENOL) suppository 650 mg  650 mg Rectal Q6H PRN    ezetimibe (ZETIA) tablet 10 mg  10 mg Oral DAILY    doxazosin (CARDURA) tablet 2 mg  2 mg Oral DAILY    [Held by provider] hydroCHLOROthiazide (HYDRODIURIL) tablet 25 mg  25 mg Oral DAILY    metoprolol tartrate (LOPRESSOR) tablet 50 mg  50 mg Oral BID    aspirin delayed-release tablet 81 mg  81 mg Oral DAILY    pravastatin (PRAVACHOL) tablet 20 mg  20 mg Oral QHS     ______________________________________________________________________  EXPECTED LENGTH OF STAY: 4d 2h  ACTUAL LENGTH OF STAY:          10                 Tay Candelaria MD

## 2021-08-08 NOTE — PROGRESS NOTES
Problem: Falls - Risk of  Goal: *Absence of Falls  Description: Document Marcia Canton Fall Risk and appropriate interventions in the flowsheet. Outcome: Progressing Towards Goal  Note: Fall Risk Interventions:  Mobility Interventions: Communicate number of staff needed for ambulation/transfer         Medication Interventions: Evaluate medications/consider consulting pharmacy    Elimination Interventions: Toileting schedule/hourly rounds              Problem: Pressure Injury - Risk of  Goal: *Prevention of pressure injury  Description: Document Anmol Scale and appropriate interventions in the flowsheet.   Outcome: Progressing Towards Goal  Note: Pressure Injury Interventions:  Sensory Interventions: Assess changes in LOC    Moisture Interventions: Check for incontinence Q2 hours and as needed    Activity Interventions: PT/OT evaluation    Mobility Interventions: PT/OT evaluation    Nutrition Interventions: Offer support with meals,snacks and hydration    Friction and Shear Interventions: Apply protective barrier, creams and emollients

## 2021-08-08 NOTE — PROGRESS NOTES
[address] [facility] Adult  Hospitalist Group                                                                                          Hospitalist Progress Note  Fausto Mckay MD  Answering service: [phone] OR [phone] from in house phone        Date of Service:  2021  NAME:  Sandra Johansen  :  1940  MRN:  [id number]    This documentation was facilitated by a Voice Recognition software and may contain inadvertent typographical errors. Admission Summary:   Sandra Johansen is a 78 y.o. female with right breast cancer s/p R axillary lymph node dissection and excision of right breast implant for adenopathy and mass on  with path + for metastatic melanoma, GERD, dyslipidemia, HTN, TIA and hx malignant melanoma who presents with dyspnea and pleuritic chest pain for the past several days. Interval history / Subjective:   2021 :   Vaginal irritation = diflucan/lotrisone cr  Cont to be very weak  Will discuss goals of care with primary oncologist on       Assessment & Plan:   Nausea and vomiting, suspect gastritis, resolved. --Antiemetics,antiacids. --CT A/P unremarkable for any acute processes. - better     Acute pulmonary emboli  --CT of the lung showed small burden of bilateral acute versus subacute pleural embolism, new multiple subcentimeter trach right lung nodules which may represent metastatic disease.  -Hemodynamically stable, no oxygen requirement, no right heart strain  -Continue Lovenox 1 mg/kg every 12 hourly. Lovenox is preferred anticoagulants in the setting of cancer (metastatic melanoma) however patient is not inclined to continue with injections, so patient will probably for be discharged on NOACs. -- Hematology/oncology consulted, recommended Xarlto on discharge. SIRS (fever and tachycardia) possible etiologies include acute PE, hypovolemia. No definitive source of infection. Fever did not recur since . No infection/sepsis. Antibiotics discontinued on 8/1  -after admission, patient developed tachycardia and Tmax 100.8,no recurrence   -Procalcitonin level <0.05. Lactic acid 0.9. Blood culture in process,negative thus far. UA ,negative. Would like to obtain culture from right axillary drain.  -Patient was started on empiric antibiotics with vancomycin, Levaquin and tobramycin. DC tobramycin, continue with vancomycin Levaquin. Infection is looking less likely  --CT abdomen pelvis no intra-abdominal evidence of infection. --Temp of 101 a.m. of 8/2, no recurrence. Continue to monitor. --Low-grade fever again on 8/4, sending some infectious work-up including pro Rik, culture from right axillarybreast drain. - t max 8/8/2021 : 99.0. For cxr and blood cult  - neg form 8/6 where temp was 102. #Hx R breast cancer   #Metastatic melanoma  -Outpatient follow up ,may need CT PET scan    #Right axillary drain present PTA. She recently had right axillary lymph node node dissection and right breast implant explantation by Dr. Maia Obrien. --Drain does not look purulent. Sending culture as she is spiking low-grade intermittent fever  - stable on exam 8/5/2021  Fever concerning 8/6/2021       #HTN  -continue hctz, cardura and metoprolol  BP Readings from Last 1 Encounters:   08/08/21 (!) 146/66         #Dyslipidemia  -continue pravastatin and zetia    #Hyponatremia likely due to hypovolemia: Improved with IV fluids. - last 130 S Na. 8/8/2021       DVT prophylaxis: Full dose Lovenox  MPOA: Oskar Cobian,   Code: Full  Disposition: SNF versus IPR     FUNCTIONAL STATUS PRIOR TO HOSPITALIZATION Ambulatory with Use of [address] [place] 349 Problems  Date Reviewed: 7/28/2021        Codes Class Noted POA    Pulmonary emboli [facility]) ICD-10-CM: I26.99  ICD-9-CM: 415.19  7/28/2021 Unknown                After personally interviewing pt at bedside the following is noted on 8/8/2021 :    Review of Systems   Constitutional: Negative. Cardiovascular: Negative. Gastrointestinal: Negative. Psychiatric/Behavioral: Negative. But appears depressed   All other systems reviewed and are negative. I had a face to face encounter with patient on 8/8/2021 at bedside for the following physical exam:     PHYSICAL EXAM:    Visit Vitals  BP (!) 146/66   Pulse 69   Temp 98.7 °F (37.1 °C)   Resp 18   Ht 5' 3\" (1.6 m)   Wt 64.7 kg (142 lb 11.2 oz)   SpO2 93%   BMI 25.28 kg/m²          Physical Exam  Vitals reviewed. Constitutional:       General: She is not in acute distress. Appearance: She is not ill-appearing. Comments: Debilitated, and poor in pt activity    HENT:      Head: Normocephalic and atraumatic. Right Ear: External ear normal.      Left Ear: External ear normal.      Nose: Nose normal.      Mouth/Throat:      Mouth: Mucous membranes are dry. Pharynx: Oropharynx is clear. Eyes:      Extraocular Movements: Extraocular movements intact. Conjunctiva/sclera: Conjunctivae normal.      Pupils: Pupils are equal, round, and reactive to light. Cardiovascular:      Rate and Rhythm: Normal rate and regular rhythm. Heart sounds: No murmur heard. No gallop. Pulmonary:      Effort: Pulmonary effort is normal.      Breath sounds: Normal breath sounds. Abdominal:      General: Abdomen is flat. Bowel sounds are normal.      Palpations: Abdomen is soft. Musculoskeletal:         General: No swelling or deformity. Cervical back: Normal range of motion and neck supple. Skin:     Coloration: Skin is not jaundiced or pale. Neurological:      General: No focal deficit present. Mental Status: She is alert and oriented to person, place, and time. Motor: Weakness present.    Psychiatric:         Behavior: Behavior normal.      Comments: Seems depressed, pt denies                            Data Review:    Review and/or order of clinical lab test  Review and/or order of tests in the radiology section of CPT  Review and/or order of tests in the medicine section of CPT      Labs:     Recent Labs     08/07/21 0233   WBC 9.0   HGB 8.2*   HCT 24.2*        Recent Labs     08/07/21 0233   *   K 4.2   CL 96*   CO2 27   BUN 17   CREA 0.56   *   CA 8.7     Recent Labs     08/07/21 0233   ALT 30   AP 66   TBILI 0.4   TP 6.4   ALB 2.0*   GLOB 4.4*     No results for input(s): INR, PTP, APTT, INREXT, INREXT in the last 72 hours. No results for input(s): FE, TIBC, PSAT, FERR in the last 72 hours. Lab Results   Component Value Date/Time    Folate >24.0 (H) 01/09/2010 03:15 AM      No results for input(s): PH, PCO2, PO2 in the last 72 hours. No results for input(s): CPK, CKNDX, TROIQ in the last 72 hours.     No lab exists for component: CPKMB  Lab Results   Component Value Date/Time    Cholesterol, total 150 05/07/2010 07:39 AM    HDL Cholesterol 41 05/07/2010 07:39 AM    LDL, calculated 72.2 05/07/2010 07:39 AM    Triglyceride 184 05/07/2010 07:39 AM    CHOL/HDL Ratio 3.7 05/07/2010 07:39 AM     Lab Results   Component Value Date/Time    Glucose (POC) 171 (H) 08/05/2021 12:18 PM    Glucose (POC) 158 (H) 05/15/2021 09:50 PM    Glucose (POC) 109 (H) 03/10/2021 02:12 PM     Lab Results   Component Value Date/Time    Color YELLOW/STRAW 08/07/2021 07:23 PM    Appearance CLEAR 08/07/2021 07:23 PM    Specific gravity 1.017 08/07/2021 07:23 PM    Specific gravity <1.005 01/21/2011 04:45 AM    pH (UA) 5.0 08/07/2021 07:23 PM    Protein TRACE (A) 08/07/2021 07:23 PM    Glucose Negative 08/07/2021 07:23 PM    Ketone Negative 08/07/2021 07:23 PM    Bilirubin Negative 08/07/2021 07:23 PM    Urobilinogen 0.2 08/07/2021 07:23 PM    Nitrites Negative 08/07/2021 07:23 PM    Leukocyte Esterase Negative 08/07/2021 07:23 PM    Epithelial cells FEW 08/07/2021 07:23 PM    Bacteria Negative 08/07/2021 07:23 PM    WBC 0-4 08/07/2021 07:23 PM    RBC 0-5 08/07/2021 07:23 PM         Medications Reviewed:     Current Facility-Administered Medications   Medication Dose Route Frequency    fluconazole (DIFLUCAN) tablet 200 mg  200 mg Oral DAILY    clotrimazole-betamethasone (LOTRISONE) 1-0.05 % cream   Topical BID    ondansetron (ZOFRAN) injection 2 mg  2 mg IntraVENous Q4H PRN    pantoprazole (PROTONIX) tablet 40 mg  40 mg Oral ACB    calcium carbonate (TUMS) chewable tablet 200 mg [elemental]  200 mg Oral TID WITH MEALS    sodium chloride (NS) flush 5-10 mL  5-10 mL IntraVENous PRN    enoxaparin (LOVENOX) injection 60 mg  1 mg/kg SubCUTAneous Q12H    oxyCODONE-acetaminophen (PERCOCET) 5-325 mg per tablet 1 Tablet  1 Tablet Oral Q4H PRN    acetaminophen (TYLENOL) tablet 650 mg  650 mg Oral Q6H PRN    Or    acetaminophen (TYLENOL) suppository 650 mg  650 mg Rectal Q6H PRN    ezetimibe (ZETIA) tablet 10 mg  10 mg Oral DAILY    doxazosin (CARDURA) tablet 2 mg  2 mg Oral DAILY    [Held by provider] hydroCHLOROthiazide (HYDRODIURIL) tablet 25 mg  25 mg Oral DAILY    metoprolol tartrate (LOPRESSOR) tablet 50 mg  50 mg Oral BID    aspirin delayed-release tablet 81 mg  81 mg Oral DAILY    pravastatin (PRAVACHOL) tablet 20 mg  20 mg Oral QHS     ______________________________________________________________________  EXPECTED LENGTH OF STAY: 4d 2h  ACTUAL LENGTH OF STAY:          11                 Micky Eli MD

## 2021-08-08 NOTE — PROGRESS NOTES
Problem: Falls - Risk of  Goal: *Absence of Falls  Description: Document Sony Rai Fall Risk and appropriate interventions in the flowsheet. Outcome: Progressing Towards Goal  Note: Fall Risk Interventions:  Mobility Interventions: Communicate number of staff needed for ambulation/transfer         Medication Interventions: Evaluate medications/consider consulting pharmacy    Elimination Interventions: Patient to call for help with toileting needs              Problem: Pressure Injury - Risk of  Goal: *Prevention of pressure injury  Description: Document Anmol Scale and appropriate interventions in the flowsheet.   Outcome: Progressing Towards Goal  Note: Pressure Injury Interventions:  Sensory Interventions: Assess changes in LOC    Moisture Interventions: Check for incontinence Q2 hours and as needed, Apply protective barrier, creams and emollients    Activity Interventions: Pressure redistribution bed/mattress(bed type)    Mobility Interventions: Pressure redistribution bed/mattress (bed type)    Nutrition Interventions: Offer support with meals,snacks and hydration    Friction and Shear Interventions: Apply protective barrier, creams and emollients

## 2021-08-09 NOTE — PROGRESS NOTES
[facility] Adult  Hospitalist Group                                                                                          Hospitalist Progress Note  Morgan Higgins MD  Answering service: [phone] OR [phone] from in house phone        Date of Service:  2021  NAME:  Cecilio Vernon  :  1940  MRN:  [id number]    This documentation was facilitated by a Voice Recognition software and may contain inadvertent typographical errors. Admission Summary:   Cecilio Vernon is a 78 y.o. female with right breast cancer s/p R axillary lymph node dissection and excision of right breast implant for adenopathy and mass on  with path + for metastatic melanoma, GERD, dyslipidemia, HTN, TIA and hx malignant melanoma who presents with dyspnea and pleuritic chest pain for the past several days. Interval history / Subjective:   2021 :   Poor functional status   As noted by DR. Wagoner: \"Sorry to see her not thriving\" note   For goals of care conference with  later today     12:57 PM ; have entered a ACP note        Assessment & Plan:   Nausea and vomiting, suspect gastritis, resolved. --Antiemetics,antiacids. --CT A/P unremarkable for any acute processes. - better     Acute pulmonary emboli  --CT of the lung showed small burden of bilateral acute versus subacute pleural embolism, new multiple subcentimeter trach right lung nodules which may represent metastatic disease.  -Hemodynamically stable, no oxygen requirement, no right heart strain  -Continue Lovenox 1 mg/kg every 12 hourly. Lovenox is preferred anticoagulants in the setting of cancer (metastatic melanoma) however patient is not inclined to continue with injections, so patient will probably for be discharged on NOACs. -- Hematology/oncology consulted, recommended Xarlto on discharge. SIRS (fever and tachycardia) possible etiologies include acute PE, hypovolemia. No definitive source of infection.   Fever did not recur since 7/29. No infection/sepsis. Antibiotics discontinued on 8/1  -after admission, patient developed tachycardia and Tmax 100.8,no recurrence   -Procalcitonin level <0.05. Lactic acid 0.9. Blood culture in process,negative thus far. UA ,negative. Would like to obtain culture from right axillary drain.  -Patient was started on empiric antibiotics with vancomycin, Levaquin and tobramycin. DC tobramycin, continue with vancomycin Levaquin. Infection is looking less likely  --CT abdomen pelvis no intra-abdominal evidence of infection. --Temp of 101 a.m. of 8/2, no recurrence. Continue to monitor. --Low-grade fever again on 8/4, sending some infectious work-up including pro Rik, culture from right axillarybreast drain. - t max 8/9/2021 : 99.0. For cxr and blood cult  - neg form 8/6 where temp was 102. ; t max 8/9/2021 : 99.6      #Hx R breast cancer   #Metastatic melanoma  -Outpatient follow up ,may need CT PET scan    #Right axillary drain present PTA. She recently had right axillary lymph node node dissection and right breast implant explantation by Dr. Maia Obrien. --Drain does not look purulent. Sending culture as she is spiking low-grade intermittent fever  - stable on exam 8/5/2021  Fever concerning 8/6/2021       #HTN  -continue hctz, cardura and metoprolol  BP Readings from Last 1 Encounters:   08/09/21 (!) 168/74         #Dyslipidemia  -continue pravastatin and zetia    #Hyponatremia likely due to hypovolemia: Improved with IV fluids.    - last S Na 129 8/7        DVT prophylaxis: Full dose Lovenox  MPOA: Oskar Cobian,   Code: Full  Disposition: SNF versus IPR     FUNCTIONAL STATUS PRIOR TO HOSPITALIZATION Ambulatory with Use of [address] Problems  Date Reviewed: 7/28/2021        Codes Class Noted POA    Pulmonary emboli [facility]) ICD-10-CM: I26.99  ICD-9-CM: 415.19  7/28/2021 Unknown                After personally interviewing pt at bedside the following is noted on 8/9/2021 :    Review of Systems   Constitutional: Positive for malaise/fatigue. Negative for fever. Respiratory: Negative. Cardiovascular: Negative. Gastrointestinal: Negative. Neurological: Positive for weakness. Psychiatric/Behavioral: Negative. But appears depressed   All other systems reviewed and are negative. I had a face to face encounter with patient on 8/9/2021 at bedside for the following physical exam:     PHYSICAL EXAM:    Visit Vitals  BP (!) 168/74   Pulse 76   Temp 99.6 °F (37.6 °C)   Resp 18   Ht 5' 3\" (1.6 m)   Wt 64.7 kg (142 lb 11.2 oz)   SpO2 92%   BMI 25.28 kg/m²          Physical Exam  Vitals reviewed. Constitutional:       General: She is not in acute distress. Appearance: She is not ill-appearing. Comments: Debilitated, and poor in pt activity    HENT:      Head: Normocephalic and atraumatic. Right Ear: External ear normal.      Left Ear: External ear normal.      Nose: Nose normal.      Mouth/Throat:      Mouth: Mucous membranes are dry. Pharynx: Oropharynx is clear. Eyes:      Extraocular Movements: Extraocular movements intact. Conjunctiva/sclera: Conjunctivae normal.      Pupils: Pupils are equal, round, and reactive to light. Cardiovascular:      Rate and Rhythm: Normal rate and regular rhythm. Heart sounds: No murmur heard. No gallop. Pulmonary:      Effort: Pulmonary effort is normal.      Breath sounds: Normal breath sounds. Abdominal:      General: Abdomen is flat. Bowel sounds are normal.      Palpations: Abdomen is soft. Musculoskeletal:         General: No swelling or deformity. Cervical back: Normal range of motion and neck supple. Skin:     Coloration: Skin is not jaundiced or pale. Neurological:      General: No focal deficit present. Mental Status: She is alert and oriented to person, place, and time. Motor: Weakness present.    Psychiatric:         Mood and Affect: Mood normal. Behavior: Behavior normal.      Comments: Poor understanding of current status ; ask that I discuss w                             Data Review:    Review and/or order of clinical lab test  Review and/or order of tests in the radiology section of CPT  Review and/or order of tests in the medicine section of CPT      Labs:     Recent Labs     08/07/21 0233   WBC 9.0   HGB 8.2*   HCT 24.2*        Recent Labs     08/07/21 0233   *   K 4.2   CL 96*   CO2 27   BUN 17   CREA 0.56   *   CA 8.7     Recent Labs     08/07/21 0233   ALT 30   AP 66   TBILI 0.4   TP 6.4   ALB 2.0*   GLOB 4.4*     No results for input(s): INR, PTP, APTT, INREXT, INREXT in the last 72 hours. No results for input(s): FE, TIBC, PSAT, FERR in the last 72 hours. Lab Results   Component Value Date/Time    Folate >24.0 (H) 01/09/2010 03:15 AM      No results for input(s): PH, PCO2, PO2 in the last 72 hours. No results for input(s): CPK, CKNDX, TROIQ in the last 72 hours.     No lab exists for component: CPKMB  Lab Results   Component Value Date/Time    Cholesterol, total 150 05/07/2010 07:39 AM    HDL Cholesterol 41 05/07/2010 07:39 AM    LDL, calculated 72.2 05/07/2010 07:39 AM    Triglyceride 184 05/07/2010 07:39 AM    CHOL/HDL Ratio 3.7 05/07/2010 07:39 AM     Lab Results   Component Value Date/Time    Glucose (POC) 171 (H) 08/05/2021 12:18 PM    Glucose (POC) 158 (H) 05/15/2021 09:50 PM    Glucose (POC) 109 (H) 03/10/2021 02:12 PM     Lab Results   Component Value Date/Time    Color YELLOW/STRAW 08/07/2021 07:23 PM    Appearance CLEAR 08/07/2021 07:23 PM    Specific gravity 1.017 08/07/2021 07:23 PM    Specific gravity <1.005 01/21/2011 04:45 AM    pH (UA) 5.0 08/07/2021 07:23 PM    Protein TRACE (A) 08/07/2021 07:23 PM    Glucose Negative 08/07/2021 07:23 PM    Ketone Negative 08/07/2021 07:23 PM    Bilirubin Negative 08/07/2021 07:23 PM    Urobilinogen 0.2 08/07/2021 07:23 PM    Nitrites Negative 08/07/2021 07:23 PM    Leukocyte Esterase Negative 08/07/2021 07:23 PM    Epithelial cells FEW 08/07/2021 07:23 PM    Bacteria Negative 08/07/2021 07:23 PM    WBC 0-4 08/07/2021 07:23 PM    RBC 0-5 08/07/2021 07:23 PM         Medications Reviewed:     Current Facility-Administered Medications   Medication Dose Route Frequency    fluconazole (DIFLUCAN) tablet 200 mg  200 mg Oral DAILY    clotrimazole-betamethasone (LOTRISONE) 1-0.05 % cream   Topical BID    ondansetron (ZOFRAN) injection 2 mg  2 mg IntraVENous Q4H PRN    pantoprazole (PROTONIX) tablet 40 mg  40 mg Oral ACB    calcium carbonate (TUMS) chewable tablet 200 mg [elemental]  200 mg Oral TID WITH MEALS    sodium chloride (NS) flush 5-10 mL  5-10 mL IntraVENous PRN    enoxaparin (LOVENOX) injection 60 mg  1 mg/kg SubCUTAneous Q12H    oxyCODONE-acetaminophen (PERCOCET) 5-325 mg per tablet 1 Tablet  1 Tablet Oral Q4H PRN    acetaminophen (TYLENOL) tablet 650 mg  650 mg Oral Q6H PRN    Or    acetaminophen (TYLENOL) suppository 650 mg  650 mg Rectal Q6H PRN    ezetimibe (ZETIA) tablet 10 mg  10 mg Oral DAILY    doxazosin (CARDURA) tablet 2 mg  2 mg Oral DAILY    [Held by provider] hydroCHLOROthiazide (HYDRODIURIL) tablet 25 mg  25 mg Oral DAILY    metoprolol tartrate (LOPRESSOR) tablet 50 mg  50 mg Oral BID    aspirin delayed-release tablet 81 mg  81 mg Oral DAILY    pravastatin (PRAVACHOL) tablet 20 mg  20 mg Oral QHS     ______________________________________________________________________  EXPECTED LENGTH OF STAY: 4d 2h  ACTUAL LENGTH OF STAY:          12                 Sal Garcia MD

## 2021-08-09 NOTE — PROGRESS NOTES
Case d/w Dr. Parker Media by phone directly about goals or care, declining performance status. Agree with plans to discuss goals of care with patient. I will also speak with her tomorrow regarding risks/benefits of treatment in her current situation.  Traditionally, treatment is reserved for patients who are ambulatory and able to tolerate side effects.     Sergio Vernon

## 2021-08-09 NOTE — PROGRESS NOTES
Problem: Self Care Deficits Care Plan (Adult)  Goal: *Acute Goals and Plan of Care (Insert Text)  Description:   FUNCTIONAL STATUS PRIOR TO ADMISSION: Patient was modified independent using a rolling walker for functional mobility. Recent rehab at SNF(approx one month ago)    HOME SUPPORT: The patient lived with . Occupational Therapy Goals  Initiated 8/2/2021, Reviewed 8/9/21, con't goals  1. Patient will perform UB bathing and dressing with supervision/set-up within 7 day(s). 2.  Patient will perform lower body dressing with AD with moderate assistance  within 7 day(s). 3.  Patient will perform sit <> stand to participate with standing ADL tasks with minimal assistance within 7 day(s). 4.  Patient will perform toilet transfers with moderate assistance at  level to [facility] within 7 day(s). 5.  Patient will perform all aspects of toileting with moderate assistance  within 7 day(s). 6.  Patient will participate in upper extremity therapeutic exercise/activities through comfortable AROM with supervision/set-up for 8 minutes within 7 day(s). Outcome: Progressing Towards Goal   OCCUPATIONAL THERAPY TREATMENT/WEEKLY RE-ASSESSMENT  Patient: Dana Espinosa (57 y.o. female)  Date: 8/9/2021  Diagnosis: Pulmonary emboli ([facility] [address] [I26.99] <principal problem not specified>       Precautions: Fall  Chart, occupational therapy assessment, plan of care, and goals were reviewed. ASSESSMENT  Patient continues with skilled OT services and is not progressing towards goals. Pt limited by drowsiness, decrease sit balance (posterior lateral lean towards  left), L sided pain, decrease mobility, and decrease stand balance. Pt presents at mod to total assist level with mobility and self-care d/t deficits listed above. Per Dr. Oscar Boyd, plans on have family meeting in regards to pt's prognosis (?Hospice). Will con't to follow and make adjustments to goals/freq as needed.      Current Level of Function Impacting Discharge (ADLs): mod to total    Other factors to consider for discharge: pending prognosis         PLAN :  Goals have been updated based on progression since last assessment. Patient continues to benefit from skilled intervention to address the above impairments. Continue to follow patient 4 times a week to address goals. Recommend with staff: OOB to chair for meals    Recommend next OT session: see goals    Recommendation for discharge: (in order for the patient to meet his/her long term goals)  Pending prognosis    This discharge recommendation:  A follow-up discussion with the attending provider and/or case management is planned    IF patient discharges home will need the following DME: TBD       SUBJECTIVE:   Patient agreeable to work with therapy    OBJECTIVE DATA SUMMARY:   Cognitive/Behavioral Status:  Neurologic State: Drowsy     Cognition: Follows commands             Functional Mobility and Transfers for ADLs:  Bed Mobility:  Rolling: Moderate assistance;Maximum assistance  Supine to Sit: Maximum assistance    Transfers:  Sit to Stand: Moderate assistance;Assist x2  Functional Transfers  Adaptive Equipment: Walker (comment)  Bed to Chair: Maximum assistance;Assist x2    Balance:  Sitting: Impaired  Sitting - Static: Poor (constant support)  Standing: Impaired; With support    ADL Intervention:  Feeding  Feeding Assistance: Minimum assistance    Grooming  Brushing/Combing Hair: Minimum assistance; Moderate assistance    Upper Body Bathing  Bathing Assistance: Maximum assistance  Position Performed: Seated edge of bed    Lower Body Bathing  Perineal  : Total assistance (dependent)  Lower Body : Maximum assistance  Position Performed: Seated edge of bed    Upper Body [address]: Moderate assistance    Lower Body Dressing Assistance  Socks: Total assistance (dependent)    Toileting  Toileting Assistance:  Total assistance(dependent)         Therapeutic Exercises:       Pain:  L sided pain    Activity Tolerance:   Fair    After treatment patient left in no apparent distress:   Sitting in chair, Call bell within reach, Bed / chair alarm activated, and Caregiver / family present    COMMUNICATION/COLLABORATION:   The patients plan of care was discussed with: Physical therapist and Registered nurse.      Malini Gómez OTR/L  Time Calculation: 28 mins

## 2021-08-09 NOTE — PROGRESS NOTES
Problem: Mobility Impaired (Adult and Pediatric)  Goal: *Acute Goals and Plan of Care (Insert Text)  Description: FUNCTIONAL STATUS PRIOR TO ADMISSION: Patient was modified independent using a walker for functional mobility. HOME SUPPORT PRIOR TO ADMISSION: The patient lived with spouse but did not require assist.    Physical Therapy Goals  Reassessment 8/9/2021  1. Patient will move from supine to sit and sit to supine  and roll side to side in bed with minimal assistance within 7 day(s). 2.  Patient will transfer from bed to chair and chair to bed with minimal assistance using the least restrictive device within 7 day(s). 3.  Patient will perform sit to stand with minimal assistance within 7 day(s). 4.  Patient will ambulate with minimal assistance for 25 feet with the least restrictive device within 7 day(s). Physical Therapy Goals  Initiated 7/30/2021  1. Patient will move from supine to sit and sit to supine  and roll side to side in bed with minimal assistance/contact guard assist within 7 day(s). 2.  Patient will transfer from bed to chair and chair to bed with minimal assistance  using the least restrictive device within 7 day(s). 3.  Patient will perform sit to stand with minimal assistance  within 7 day(s). 4.  Patient will ambulate with minimal assistance/contact guard assist for 75 feet with the least restrictive device within 7 day(s). 5.  Patient will ascend/descend 3 stairs with 1 handrail(s) with minimal assistance/contact guard assist within 7 day(s). 8/9/2021 1324 by Jose Mayer  Outcome: Not Progressing Towards Goal   PHYSICAL THERAPY TREATMENT: WEEKLY REASSESSMENT  Patient: Hood Black (15 y.o. female)  Date: 8/9/2021  Primary Diagnosis: Pulmonary emboli [facility]) [I26.99]        Precautions:   Fall      ASSESSMENT  Patient continues with skilled PT services and is not progressing towards goals.  Pt presents with drowsiness, global weakness, decreased balance, and impaired functional mobility well below her baseline. Pt awakened and agreeable to work with therapy. Pt transfers with 2 person assistance slowly to EOB with assistance for posterior lean and lean to L side and minimal use of BUE to assist herself with transfer. Pt sat EOB with constant support for balance varying from CGA to moderate assistance. Pt stood and shuffled to bedside chair with rolling walker  and posterior lean requiring 2 person assistance. Pt fatigues quickly as noted with BLE progressively flexing and complained of back pain while standing. Pt also reported feeling lightheaded following transfer to chair. /65. Pt remained in chair with chair alarm and positioned with 3 pillows as she continued to lean toward the L in chair. Family member came in near session end and reports pt has been leaning toward L ~ 8 months ago. Patient's progression toward goals since last assessment: slow progress, goals not met, downgraded ambulation goal to 25 feet    Current Level of Function Impacting Discharge (mobility/balance): maximum assist x 2 for bed mobility and bed to chair with rolling walker     Other factors to consider for discharge: fall risk, well below her baseline, progressive functional decline         PLAN :  Goals have been updated based on progression since last assessment. Patient continues to benefit from skilled intervention to address the above impairments. Recommendations and Planned Interventions: bed mobility training, transfer training, gait training, and patient and family training/education      Frequency/Duration: Patient will be followed by physical therapy:  5 times a week to address goals.     Recommendation for discharge: (in order for the patient to meet his/her long term goals)  Therapy up to 5 days/week in SNF setting    This discharge recommendation:  A follow-up discussion with the attending provider and/or case management is planned    IF patient discharges home will need the following DME: hospital bed,wheelchair, mechanical lift possibly          SUBJECTIVE:   Patient stated I am sleepy.     OBJECTIVE DATA SUMMARY:   HISTORY:    Past Medical History:   Diagnosis Date    Arrhythmia     LBBB    Arthritis     SPINE    Axillary adenopathy 3/24/2021    Calculus of kidney 1990    Cancer ([address]     right breast X2 ; BCCA    Chronic pain     COVID-19 vaccine series completed     MODERNA3/31/21,4/28/21    Environmental allergies     GERD (gastroesophageal reflux disease)     Hypercholesterolemia     Hypertension     Lymphadenopathy     Nausea & vomiting     Other ill-defined conditions(799.89)     high cholesterol    Other ill-defined conditions(799.89)     NO BP/VENIPUNCTURES RIGHT ARM (POST LYMPH NODE DISSECTION)    Other ill-defined conditions(799.89)     MITRAL VALVE PROLAPSE    Pleuritic chest pain, left 7/28/2021    Recurrent malignant melanoma of skin ([address] 9/23/2020    Stroke ([address]     TIA 2009  (TAKING ASA)     Past Surgical History:   Procedure Laterality Date    HX BACK SURGERY  1998    l4-l5  ([facility])    HX BREAST LUMPECTOMY  1999    right    HX BREAST RECONSTRUCTION Right 8/27/2014    Revision Right Reconstructed Breast performed by Robert Contreras MD at [address] [place], [place] [address]wy Bilateral 2/20/2015    REVISION RIGHT RECONSTRUCTED BREAST/REMOVE TISSUE EXPANDERS/PLACE IMPLANT RIGHT/MASTOPEXY LEFT performed by Robert Contreras MD at [address] HX BREAST RECONSTRUCTION  07/16/2021    Right axillary lymph node dissection and excision of right breast implant.     HX CATARACT REMOVAL      BILATERAL    HX DILATION AND CURETTAGE      HX GI  2006    COLONOSCOPY    HX HEENT      BCCA REMOVED FROM FOREHEAD    HX LITHOTRIPSY  1991    HX MALIGNANT SKIN LESION EXCISION  10/12/2020    Wide excision of recurrent melanoma of the back    HX MASTECTOMY Right 2014    HX ORTHOPAEDIC  2005    left bunionectomy    HX OTHER SURGICAL      BCCA REMOVED FROM BACK     HX MINAL AND BSO      HX TUBAL LIGATION      HX UROLOGICAL      kidney stone    HX UROLOGICAL  2016    BLADDER SLING       EXAMINATION/PRESENTATION/DECISION MAKING:   Critical Behavior:  Neurologic State: Drowsy  Orientation Level: Oriented X4  Cognition: Follows commands  Safety/Judgement: Fall prevention, Home safety    Functional Mobility:  Bed Mobility:  Rolling: Moderate assistance;Maximum assistance  Supine to Sit: Maximum assistance, posterior lean, leans toward L side, head tilts to L        Transfers:  Sit to Stand: Moderate assistance;Assist x2  Stand to Sit: Maximum assistance;Assist x2        Bed to Chair: Maximum assistance;Assist x2 with rolling walker              Balance:   Sitting: Impaired  Sitting - Static: Poor (constant support), dynamic: poor  Standing: Impaired; With support, poor          Pain Rating:  Complains of back pain when standing, too drowsy to rate pain    Activity Tolerance:   Poor, complains of back pain, lightheadedness, drowsiness, fatigues quickly   Vitals:     08/09/21 0749  08/09/21 1236   BP:  (!) 168/74  127/65   BP 1 Location:       BP Patient Position:       Pulse:  76  75   Temp:  99.6 °F (37.6 °C)  98.8 °F (37.1 °C)   Resp:  18  18   Height:       Weight:       SpO2:  92%  93%      After treatment patient left in no apparent distress:   Sitting in chair, Call bell within reach, Bed / chair alarm activated, and Caregiver / family present    COMMUNICATION/EDUCATION:   The patients plan of care was discussed with: Registered nurse. Fall prevention education was provided and the patient/caregiver indicated understanding. and Patient/family agree to work toward stated goals and plan of care.     Thank you for this referral.  Janet Collins   Time Calculation: 30 mins

## 2021-08-09 NOTE — PROGRESS NOTES
Problem: Falls - Risk of  Goal: *Absence of Falls  Description: Document Marcia Lairdccasin Fall Risk and appropriate interventions in the flowsheet. Outcome: Progressing Towards Goal  Note: Fall Risk Interventions:  Mobility Interventions: PT Consult for mobility concerns, PT Consult for assist device competence, OT consult for ADLs         Medication Interventions: Patient to call before getting OOB    Elimination Interventions: Call light in reach, Patient to call for help with toileting needs              Problem: Pressure Injury - Risk of  Goal: *Prevention of pressure injury  Description: Document Anmol Scale and appropriate interventions in the flowsheet.   Outcome: Progressing Towards Goal  Note: Pressure Injury Interventions:  Sensory Interventions: Assess changes in LOC    Moisture Interventions: Apply protective barrier, creams and emollients, Check for incontinence Q2 hours and as needed    Activity Interventions: PT/OT evaluation    Mobility Interventions: PT/OT evaluation    Nutrition Interventions: Offer support with meals,snacks and hydration    Friction and Shear Interventions: Minimize layers, Apply protective barrier, creams and emollients                Problem: Patient Education: Go to Patient Education Activity  Goal: Patient/Family Education  Outcome: Progressing Towards Goal

## 2021-08-09 NOTE — HOSPICE
[facility]  [phone]     Patient Name: Norma Apple  YOB: 1940  Age: 78 y.o. [facility] RN Note:  Hospice consult received, reviewing chart. Will follow up with Unit Nurse and Care Manager to discuss plan of care, patient status and discharge disposition within the hour. Met with Isabell Juarez and he wants to get her home. We are planning for tomorrow discharge at 1pm with a admit nurse at [address] [place] [address] is to be delivered in am. DDNR is on chart. Thank you for the opportunity to be of service to this patient.     Hoang Catalan RN  [phone] c  [phone] o

## 2021-08-09 NOTE — ACP (ADVANCE CARE PLANNING)
Advance Care Planning       Associated diagnosis:    Malignant Melanoma, metastatic       Advance Care Planning (ACP) Physician/NP/PA Conversation      Date of Conversation:8/9/2021 Conducted with:   Pt Trevon Kimbrough nd her :   Evelin Boyd [phone] [address]:      Silvia. 70 Spouse [phone]     And pt who defers all to her      Decision and discussion:     Elects to consider DNR and comfort  Desires discussion with Hospice as the goals of care forward  agreement to meet with hospice to discuss options and desires to go home if reasonable with hospice     As a result of conversation: hospice is consulted.      DNR is considered the best status for [address] Preferences:   Length of Voluntary ACP Conversation in minutes:  20 minutes    Geena Erwin MD

## 2021-08-09 NOTE — PROGRESS NOTES
Physical Therapy  RN requested assistance to transfer pt from chair to bed, RN and PCT assisted, pt transferred sit to stand with maximum assist x 3 and side stepped to bed  with rolling walker and moderate assist x 2, pt transferred sit to supine with maximum assist x 2, will continue PT intervention tomorrow  Amy Larinda Bumpers PT

## 2021-08-09 NOTE — PROGRESS NOTES
ANDRE:    RUR 16%    Disposition: SNF rehab-[employer] auth pending. CM spoke to [place] with [facility] this am at [phone] and still pending. [facility] has accepted patient.     Transportation: S    Primary Contact: Sommer Catalan [phone]    Follow up: PCP/Specialist    Zach Rosenbaum RN/CRM  [phone]

## 2021-08-09 NOTE — PROGRESS NOTES
Faculty or Preceptor Review of Student Work    8/9/2021  - Shift times - 0730 to 1300    The student documentation of patient care for Dionicio Wheeler has been reviewed and approved. All medications have been administered under the direct supervision of the faculty or preceptor.     Buster Elizabeth

## 2021-08-09 NOTE — PROGRESS NOTES
ANDRE:    RUR 16%    Disposition: SNF vs Hospice-[facility] For [facility]F: [facility] reference # [id number]. [address] ZT#[id number] good until 8/13. The case coordinator is Bo Cedeno. Clinicals can be faxed to [phone]. Patient's insurance does not participate with [employer]. This Megan Morton will be for [address] if SNF is chosen over Hospice. Update  4:15pm: [facility] will [facility] tomorrow [email or web address]. Referral sent to [facility] for 1pm transport tomorrow, 8/10.     Transportation: S    Primary contact: Jaison Stephens [facility]([phone])    Follow up: PCP/Specialist    Karolina Wooten RN/CRM  [phone]

## 2021-08-10 NOTE — PROGRESS NOTES
Hematology-Oncology Progress Note      Tanika Wallace  1940  [id number]  8/10/2021      Subjective:     Drowsy, but arousable. Has difficulty dialing home phone number. CT scan results from 7/31 noted.      Allergies: Cephalexin, Losartan, Other medication, Augmentin [amoxicillin-pot clavulanate], and Tetracycline  Current Facility-Administered Medications   Medication Dose Route Frequency Provider Last Rate Last Admin    fluconazole (DIFLUCAN) tablet 200 mg  200 mg Oral DAILY Rebekah Bui MD   200 mg at 08/09/21 8146    clotrimazole-betamethasone (LOTRISONE) 1-0.05 % cream   Topical BID Rebekah Bui MD   Given at 08/09/21 1720    ondansetron [facility]) injection 2 mg  2 mg IntraVENous Q4H PRN Ema Cabrera MD   2 mg at 08/08/21 0103    pantoprazole (PROTONIX) tablet 40 mg  40 mg Oral ACB Ema Cabrera MD   40 mg at 08/10/21 0706    calcium carbonate (TUMS) chewable tablet 200 mg [elemental]  200 mg Oral TID WITH MEALS Ema Cabrera MD   200 mg at 08/10/21 0706    sodium chloride (NS) flush 5-10 mL  5-10 mL IntraVENous PRN Sagar Fernández MD   10 mL at 08/08/21 2125    enoxaparin (LOVENOX) injection 60 mg  1 mg/kg SubCUTAneous Q12H Ema Cabrera MD   60 mg at 08/09/21 2146    oxyCODONE-acetaminophen (PERCOCET) 5-325 mg per tablet 1 Tablet  1 Tablet Oral Q4H PRN Ema Cabrera MD   1 Tablet at 08/09/21 0822    acetaminophen (TYLENOL) tablet 650 mg  650 mg Oral Q6H PRN Sagar Fernández MD   650 mg at 08/08/21 2120    Or    acetaminophen (TYLENOL) suppository 650 mg  650 mg Rectal Q6H PRN Sagar Fernández MD   650 mg at 08/05/21 4329    ezetimibe (ZETIA) tablet 10 mg  10 mg Oral DAILY Sagar Fernández MD   10 mg at 08/09/21 0817    doxazosin (CARDURA) tablet 2 mg  2 mg Oral DAILY Sagar Fernández MD   2 mg at 08/09/21 0817    [Held by provider] hydroCHLOROthiazide (HYDRODIURIL) tablet 25 mg  25 mg Oral DAILY Sagar Fernández MD   25 mg at 07/31/21 1278    metoprolol tartrate (LOPRESSOR) tablet 50 mg  50 mg Oral BID Ovi Chen MD   50 mg at 08/09/21 1720    aspirin delayed-release tablet 81 mg  81 mg Oral DAILY Ovi Chen MD   81 mg at 08/09/21 0817    pravastatin (PRAVACHOL) tablet 20 mg  20 mg Oral QHS Ovi Chen MD   20 mg at 08/09/21 2146     Objective:     Patient Vitals for the past 24 hrs:   BP Temp Pulse Resp SpO2   08/10/21 0734 (!) 154/77 99.3 °F (37.4 °C) 99 24 91 %   08/10/21 0233 (!) 167/74 100.1 °F (37.8 °C) 94 18 91 %   08/09/21 2021 (!) 174/78 100.2 °F (37.9 °C) 71 18 92 %   08/09/21 1236 127/65 98.8 °F (37.1 °C) 75 18 93 %   08/09/21 1221  98.8 °F (37.1 °C) 75 18        Gen: NAD  HEENT: PERRL, Sclerae anicteric  Cv: RRR without m/r/g  Pulm: CTA bilaterally  Abd: NABS, NTND, No HSM  Ext: No c/c/e    Available labs reviewed:  Labs:  No results found for this or any previous visit (from the past 24 hour(s)). Assessment and Plan     77 y/o woman with metastatic melanoma and ECOG performance status = 3.     1. Melanoma: agree with plans for hospice. Case d/w  and Dr. Lexii Soares. Appreciate excellent hospitalist care. Will sign off. Thank you for allowing us to participate in the care of this very pleasant patient.     Saul Moran MD  Hematology/Oncology  Phone [phone]

## 2021-08-10 NOTE — PROGRESS NOTES
Problem: Falls - Risk of  Goal: *Absence of Falls  Description: Document Sony Rai Fall Risk and appropriate interventions in the flowsheet. Outcome: Progressing Towards Goal  Note: Fall Risk Interventions:  Mobility Interventions: Utilize walker, cane, or other assistive device, Utilize gait belt for transfers/ambulation         Medication Interventions: Evaluate medications/consider consulting pharmacy    Elimination Interventions: Call light in reach              Problem: Pressure Injury - Risk of  Goal: *Prevention of pressure injury  Description: Document Anmol Scale and appropriate interventions in the flowsheet.   Outcome: Progressing Towards Goal  Note: Pressure Injury Interventions:  Sensory Interventions: Assess changes in LOC    Moisture Interventions: Check for incontinence Q2 hours and as needed    Activity Interventions: Pressure redistribution bed/mattress(bed type)    Mobility Interventions: Pressure redistribution bed/mattress (bed type)    Nutrition Interventions: Offer support with meals,snacks and hydration    Friction and Shear Interventions: Minimize layers                Problem: Discharge Planning  Goal: *Discharge to safe environment  Outcome: Progressing Towards Goal

## 2021-08-10 NOTE — HOSPICE
Nedra Leslie  1940  81                                                          Principle Hospice Diagnosis: malignant melanoma    Diagnoses RELATED to the terminal prognosis: Pulmonary Emboli   Sepsis    R breast cancer     Other Diagnoses: Dyslipidemia, HTN      Date of Hospice Admission: 8/10/2021    Hospice Attending Elected by Patient: Mariana Adams    Primary Care Physician:     Admitting RN: Priyanka Sharpe CM: Deangelo Muniz Worker: [place] Spare:    Keisha Brown DNR:  Yes          Home: did not discuss      Direct Observation: sleeping on arrival, wakes to tactile stimulation. denies pain. resp even and unlabored. pt has YOLANDA drain from right axilla. wound with staples, open to air. bowel sounds active. incontinent x 2. refuses Rossi. has extensive yeast infection on perineum and buttocks. incontinent of large BM and urine. complete bed bath given. ER Visits/ Hospitalizations in past year: 5      Onset Date of Hospice Diagnosis: 2021      Summary of Disease Progression Leading to Hospice Diagnosis:   Nedra Leslie is a 78 y.o. female with right breast cancer s/p R axillary lymph node dissection and excision of right breast implant for adenopathy and mass on  with path + for metastatic melanoma, GERD, dyslipidemia, HTN, TIA and hx malignant melanoma who presents with dyspnea and pleuritic chest pain for the past several days. Use LCD Guidelines and list features: ____X____  A. Disease with distant metastases at presentation OR    ___X_____  B.  Progression from an earlier stage of disease to metastatic disease with either:                          ____X____  1. continued decline in spite of therapy                          ________  2. patient declines further disease directed therapy        SPIRITUAL/Social/Emotional: [facility] Sharma______________ contacted, agrees to serve as attending provider for hospice and provided verbal certification of terminal illness with prognosis of 6 months or less life expectancy. Orders for hospice admission, medications and plan of treatment received. Medication   reconciliation completed.     Currently this patient has:  YOLANDA drain from right axilla        MEDS:  I have reviewed the patient's medication list with MD and identified the following:  Nonformulary medications: pantoprazole  Unrelated medications: none    IDT communication to include MD, SN, SW, CH and support team.

## 2021-08-10 NOTE — PROGRESS NOTES
Faculty or Preceptor Review of Student Work    8/10/2021  - Shift times - 0730 to 1300    The student documentation of patient care for Selestalana Summer has been reviewed and approved. All medications have been administered under the direct supervision of the faculty or preceptor.     Abundio Alberto

## 2021-08-10 NOTE — PROGRESS NOTES
Problem: Falls - Risk of  Goal: *Absence of Falls  Description: Document Fausto Elise Fall Risk and appropriate interventions in the flowsheet. 8/10/2021 1259 by Anna Nam RN  Outcome: Resolved/Met  Note: Fall Risk Interventions:  Mobility Interventions: Utilize walker, cane, or other assistive device, Utilize gait belt for transfers/ambulation         Medication Interventions: Evaluate medications/consider consulting pharmacy    Elimination Interventions: Call light in reach           8/10/2021 0949 by Anna Nam RN  Outcome: Progressing Towards Goal  Note: Fall Risk Interventions:  Mobility Interventions: Utilize walker, cane, or other assistive device, Utilize gait belt for transfers/ambulation         Medication Interventions: Evaluate medications/consider consulting pharmacy    Elimination Interventions: Call light in reach              Problem: Patient Education: Go to Patient Education Activity  Goal: Patient/Family Education  Outcome: Resolved/Met     Problem: Pressure Injury - Risk of  Goal: *Prevention of pressure injury  Description: Document Anmol Scale and appropriate interventions in the flowsheet.   8/10/2021 1259 by Anna Nam RN  Outcome: Resolved/Met  Note: Pressure Injury Interventions:  Sensory Interventions: Assess changes in LOC    Moisture Interventions: Check for incontinence Q2 hours and as needed    Activity Interventions: Pressure redistribution bed/mattress(bed type)    Mobility Interventions: Pressure redistribution bed/mattress (bed type)    Nutrition Interventions: Offer support with meals,snacks and hydration    Friction and Shear Interventions: Minimize layers             8/10/2021 0949 by Anna Nam RN  Outcome: Progressing Towards Goal  Note: Pressure Injury Interventions:  Sensory Interventions: Assess changes in LOC    Moisture Interventions: Check for incontinence Q2 hours and as needed    Activity Interventions: Pressure redistribution bed/mattress(bed type)    Mobility Interventions: Pressure redistribution bed/mattress (bed type)    Nutrition Interventions: Offer support with meals,snacks and hydration    Friction and Shear Interventions: Minimize layers                Problem: Patient Education: Go to Patient Education Activity  Goal: Patient/Family Education  Outcome: Resolved/Met     Problem: Patient Education: Go to Patient Education Activity  Goal: Patient/Family Education  Outcome: Resolved/Met     Problem: Discharge Planning  Goal: *Discharge to safe environment  8/10/2021 1259 by Karen Ambrocio RN  Outcome: Resolved/Met  8/10/2021 0949 by Karen Ambrocio RN  Outcome: Progressing Towards Goal     Problem: Discharge Planning  Goal: *Discharge to safe environment  Outcome: Resolved/Met     Problem: Patient Education: Go to Patient Education Activity  Goal: Patient/Family Education  Outcome: Resolved/Met

## 2021-08-13 NOTE — HOSPICE
Patient received in bed, alert and oriented x 3.  present. Patient reports no pain  She has not been out of the bed in a week.  reports they got her up at the hospital, however it took 2-3 people. Patient describes her appetite as \"light\"  Patient stated she feels like she needs to have a good bowel movement. Upon assessment, patient had a large amount of soft, formed stool in rectal vault that patient was unable to push out. Patient was digitally disimpacted by this RN and patient tolerated well. Will start patient on a bowel regimen. Medications reviewed with .       emesis bags  kidney basin  chux  washcloths    tylenol

## 2021-08-18 PROBLEM — Z51.5 HOSPICE CARE: Status: ACTIVE | Noted: 2021-01-01

## 2021-08-19 NOTE — HOSPICE
Joint visit made with Marnie Roman for YOLANDA drain removal.  Patient is lethargic at beginning of visit. Reports nausea this morning. Zofran 4 mg q4h prn ordered. YOLANDA drain removed without difficulty. Staples removed from right axilla  She has numbness to bilateral feet. Heels are red/blanchable. Skin prep ordered daily for heels. Educated  on floating heels. Prednisone ordered for numbness  Patient would like to be able to get out of the bed. A belinda lift will be ordered and PT education to follow. Air mattress to be ordered after [employer] lift arrives. Norco added to medication list as she was previously taking it for pain.     NEW MEDICATION INITIATION DOCUMENTATION:  Consulted AT MD to report change in patient status: YES  Obtained Order from Provider for initiation of Symptom relief medication / other medication needed:YES   Documentation completed in Telephone/Visit Note in [facility]  Reason medication is being initiated: nausea  MD / Provider name consulted re: change in status / initiation of new medication: Marnie Roman NP  New Symptom(s): nausea  New Order(s): zofran 4 mg q4h prn  Name of person being taught: Giovany Mcgee, spouse  Instructions given: YES  Side Effects taught:YES  Response to teaching: Giovany Mcgee verbalized understanding    NEW MEDICATION INITIATION DOCUMENTATION:  Consulted AT MD to report change in patient status: YES  Obtained Order from Provider for initiation of Symptom relief medication / other medication needed:YES   Documentation completed in Telephone/Visit Note in [address] [facility]  Reason medication is being initiated: pain  MD / Provider name consulted re: change in status / initiation of new medication: [address]  Symptom(s): pain  New Order(s): norco 5-325 mg q4h prn  Name of person being taught: Giovany Mcgee, spouse  Instructions given: YES  Side Effects taught:YES  Response to teaching: Giovany Mcgee verbalized understanding    NEW MEDICATION INITIATION DOCUMENTATION:  Consulted AT MD to report change in patient status: YES  Obtained Order from Provider for initiation of Symptom relief medication / other medication needed:YES   Documentation completed in Telephone/Visit Note in [facility]  Reason medication is being initiated: foot numbness  MD / Provider name consulted re: change in status / initiation of new medication:Patti Agrawal  New Symptom(s): foot numbness  New Order(s): Prednisone taper: 40 mg x 3 days, 30 mg x 3 days, 20 mg x 3 days, 10 mg x 3 days  Name of person being taught: Andie Chan, spouse  Instructions given: YES  Side Effects taught:YES  Response to teaching: Andie Chan verbalized understanding

## 2021-08-20 NOTE — TELEPHONE ENCOUNTER
Patient's spouse called and spoke with Jef Low on getting information about his wife's surgery type and date. PSR stated to patient's spouse she is unable to speak to him due to him not being on the permission to release form. Call was transferred to myself and I asked the patient's spouse what information he needed. Spouse stated he needs wife's surgery date, type of surgery, and path report to be send to [facility] for her cancer policy to help cover expenses. I explained to spouse permission to release form and medical records form needing to be complete and he stated he is her caretaker and she's in hospice in the care of their home. I asked if she was with him to speak to her and he said no. After speaking with Dr. Yuki Amaro and nurse Remi Barron I spoke called spouse back stating we could fax notes to [facility] if he's signed a form with them and he stated he wasn't sure but they know who he is. I stated to spouse the only thing I could do was speak to his wife or a release form is to be completed. Spouse said she was sleeping and he woke her up while on the phone and I overheard him ask his wife if she could give me verbal consent to speak to him. Wife got on the phone and I had verify her name, , and 's name to give verbal consent to speak on her behalf and patient agreed. Patient requesting surgery date, surgical name, and path report be mailed to their home since he's difficult for him to leave the house.

## 2021-08-21 NOTE — HOSPICE
Patient sitting up in bed upon arrival for visit; patient's  present. Patient lethargic, pale. Patient able to answer questions when asked but rests with eyes closed for majority of visit. Patient reports \"all over\" pain but unable to quantify. Medication for pain offered; patient declined.  reports patient has not wanted any analgesics since Wednesday. No respiratory difficulty observed on room air.  reports patient's appetite is poor and now consists of occasional bites of food and sips of liquids. Last bowel movement on Monday; suppository offered but declined at this time. Bandage to right axilla changed; area with two small incision wounds without signs of infection. Skin prep applied to bilateral heels. Patient's signs/symptoms of decline and comfort measures discussed with patient's . Reinforced hospice 24/7 for any concerns or change in patient's condition.

## 2021-08-24 NOTE — HOSPICE
requesting visit to assess change in patient condition. Patient lying in bed, lethargic, responsive to tactile stimuli.  states patient has not eaten much since last Wednesday  Decreased fluid intake. Sleeping most of the day  Difficulty speaking. Can not swallow pills. All pills discontinued  Patient appears to be approaching end of life. Lorazepam increased to every 2 hours prn. Reviewed end of life s/s with  and daughter  They no longer need the belinda lift. Will request it be picked up.

## 2021-08-24 NOTE — HOSPICE
is visiting for a routine pastoral visit. Kemi Rahman greeted the . They engaged in life review, processes Nina's decline, and shared about their [employer] involvement and support.  provided active listening, life review and prayer.

## 2021-08-25 NOTE — HOSPICE
Patient made for 0-7. Patient is responsive only to pain. Morphine was given at 0100 and 0840. Patient breathing is more labored. She is grimacing and moaning with movement. 5 mg of morphine prior to meyers insertion. 12 F meyers inserted without difficulty using sterile technique. 1800 mL of channing urine returned. Catheter secured to right thigh. Family educated on meyers care and how to empty drainage bag. Spoke with En Yoder and received new orders for Morphine.  Increased to 10 mg q1h prn    NEW MEDICATION INITIATION DOCUMENTATION:  Consulted AT MD to report change in patient status: YES  Obtained Order from Provider for initiation of Symptom relief medication / other medication needed:YES   Documentation completed in Telephone/Visit Note in [facility]  Reason medication is being initiated:pain and shortness of breath  MD / Provider name consulted re: change in status / initiation of new Deshawn Okeefe NP  New Symptom(s): pain and shortness of breath  New Order(s): morphine 10 mg q1h prn  Name of person being taught: Yuliana Acosta,   Instructions given: YES  Side Effects taught:YES  Response to teaching: Deborah Ulloa understanding

## 2021-08-25 NOTE — HOSPICE
Patient received in bed, minimally responsive.  and daughter present   states patient did take a sip of water. Nothing to eat today  Morphine last given at 0840. Patient with nonverbal signs of pain such as facial grimacing and gaurding. Will moan when moved. 5 mg morphine given by family. Lower abdomen is distended. Possible urinary retention. Rossi offered but family unsure. They will think about it overnight and will reevaluate at visit tomorrow.

## 2021-08-26 NOTE — HOSPICE
Patient lying in bed upon arrival for visit; patient's  and daughter present. Visit joined by hospice CNA who provided bed bath to patient. Patient only responsive to turning/repositioning to which patient grimaces slightly.  reports he administered Morphine X1 dose this morning. No signs of anxiety/agitation observed. Respirations irregular at 14/minute with periods of apnea. Slight rhonchi with occasional weak, congested cough noted; Levsin administered during visit. Rossi patent and draining channing urine; 300 ml emptied from bag. Small bowel movement upon assessment; pericare performed by hospice CNA. Barrier cream applied to sacrum. Skin clammy and warm to touch. Right arm edematous; elevated on pillow. End of life signs/symptoms with correlating SRK medications reviewed with family. Reinforced hospice 24/7 for any concerns or when patient passes.

## 2021-08-27 NOTE — HOSPICE
TOD 0433 After 1 min ascultation Pt assessed without pulse/respirations. [facility] [employer] service family.  and daughter  grieving appropriately Meds wasted per protocol. Family thankful for Hospice services and support. Pronouncement of death completed by: Jayshree Dotson RN  Agency staff was not present at the time of death. The pt  within her home(location).   The following were notified of the patient's death: Dr Chapin Betancourt, [facility] [place] team.

## 2021-08-28 ENCOUNTER — HOME CARE VISIT (OUTPATIENT)
Dept: HOSPICE | Facility: HOSPICE | Age: 81
End: 2021-08-28
Payer: MEDICARE

## 2021-08-30 ENCOUNTER — HOME CARE VISIT (OUTPATIENT)
Dept: HOSPICE | Facility: HOSPICE | Age: 81
End: 2021-08-30
Payer: MEDICARE

## 2021-12-16 NOTE — PROGRESS NOTES
ANDRE:    RUR 14%    Disposition: Patient has used up all SNF days and cannot afford the co-pay. PT and OT evaluated today and recommending IPR. MD approved referral to IPR. CM spoke to patient and her  and sent referral to [facility] via [employer].     Transportation: \[facility]\""    Primary ContactBuodilon Navya Ellington(spouse) [phone]    Follow up: PCP/Specialist    Monica Arias RN/CRM  [phone] Today's Date: 12/16/2021  Patient Name: Ruthie Farris  Date of admission: 12/8/2021  7:03 PM  Patient's age: 77 y.o., 1953  Admission Dx: Pneumothorax [J93.9]  Chronic pneumothorax [J93.81]    Reason for Consult: Known lung cancer patient  Requesting Physician: Awilda Cobian MD    CHIEF COMPLAINT:    Chief Complaint   Patient presents with    Shortness of Breath       INTERVAL HISTORY:    Patient seen and examined  Labs and vitals reviewed  Patient resting comfortably   Hoping to go home soon. Chest x-ray shows slight decrease in size of the right pneumothorax      HISTORY OF PRESENT ILLNESS:    Ruthie Farris is a 77 y.o. male who is admitted to the hospital on 12/8/2021  for SOB. He had a restaging scan with oncology and his CT scan showed worsening pneumothorax and mediastinal shift. Therefore he was sent to ER for further management. He denies any fever chills. He had chest tube in place. He is currently receiving Lurbinectedin for his recurrent small cell lung cancer and most recent treatment was on 12/1/2021. He has a diagnosis of small cell lung cancer with squamous component and has been following with Dr. Dino Jansen as outpatient and his brief oncologic history as follows. 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 Chemotherapy changed to carboplatin and etoposide due to renal insufficiency from cycle #2  PCI 7/2019  SRS to adrenal gland metastasis, completed 07/2020  systemic palliative chemoimmunotherapy with carboplatin etoposide and Tecentriq, 12/2020 x4 cycles.   Maintenance Tecentriq, 3/2021  Lurbenectidin7/2/2021      Past Medical History:   has a past medical history of DDD (degenerative disc disease), cervical, Gout, Heart murmur, Hyperlipidemia, Hypertension, Lung cancer ([facility] [address] MI (myocardial infarction) ([facility] [address] Skin cancer, and Wears glasses. Past Surgical History:   has a past surgical history that includes Appendectomy; Tonsillectomy; skin biopsy; skin biopsy; Colonoscopy; Foot surgery (Right); Cardiac catheterization; cyst incision and drainage (Right, 05/14/2020); Forearm surgery (Right, 5/14/2020); knee surgery (Left, 5/14/2020); and IR CHEST TUBE INSERTION (12/13/2021). Medications:    Prior to Admission medications    Medication Sig Start Date End Date Taking? Authorizing Provider   oxyCODONE-acetaminophen (PERCOCET) 5-325 MG per tablet Take 1 tablet by mouth every 6 hours as needed for Pain for up to 15 days. 12/1/21 12/16/21 Yes David Pickens MD   dexamethasone (DECADRON) 2 MG tablet Take 1 tablet by mouth 2 times daily (with meals) for 15 days 12/1/21 12/16/21 Yes David Pickens MD   docusate sodium (COLACE) 100 MG capsule Take 100 mg by mouth 2 times daily   Yes Historical Provider, MD   B Complex-C-Folic Acid (ANGELICA-HUGO) TABS TAKE 1 TABLET BY MOUTH DAILY. HEMATINIC VIT MINERALS 7/21/20  Yes Historical Provider, MD   traZODone (DESYREL) 100 MG tablet Take 100 mg by mouth nightly  7/20/20  Yes Historical Provider, MD   ipratropium-albuterol (DUONEB) 0.5-2.5 (3) MG/3ML SOLN nebulizer solution Inhale 3 mLs into the lungs 4 times daily   Yes Historical Provider, MD   potassium chloride (MICRO-K) 10 MEQ extended release capsule Take 20 mEq by mouth daily  11/19/19  Yes Historical Provider, MD   Ferrous Fumarate (FERROCITE) 324 (106 Fe) MG TABS Take 324 mg by mouth daily    Yes Historical Provider, MD   apixaban (ELIQUIS) 5 MG TABS tablet Take 1 tablet by mouth 2 times daily 5/21/20  Yes Milad Lora MD   tamsulosin (FLOMAX) 0.4 MG capsule TAKE 1 CAPSULE BY MOUTH EVERY DAY 5/4/20  Yes David Pickens MD   lidocaine-prilocaine (EMLA) 2.5-2.5 % cream Apply topically as needed.   Apply a quarter size amount to port site 1 hour before chemotherapy. Cover with plastic wrap.  3/7/19  Yes 211 Yumiko Shrestha MD   acetaminophen (TYLENOL) 325 MG tablet Take 650 mg by mouth every 6 hours as needed for Pain   Yes Historical Provider, MD   allopurinol (ZYLOPRIM) 100 MG tablet Take 100 mg by mouth daily 12/4/18  Yes Historical Provider, MD   simvastatin (ZOCOR) 40 MG tablet Take 40 mg by mouth daily 11/27/18  Yes Historical Provider, MD   Handicap Placard [address] [place] [address] by Does not apply route 7/30/19   211 Yumiko Shrestha MD     Current Facility-Administered Medications   Medication Dose Route Frequency Provider Last Rate Last Admin    polyethylene glycol (GLYCOLAX) packet 17 g  17 g Oral Daily Ricardo Woodson MD   17 g at 12/15/21 0845    senna (SENOKOT) tablet 8.6 mg  1 tablet Oral Nightly Ricardo Woodson MD   8.6 mg at 12/15/21 2112    docusate sodium (COLACE) capsule 100 mg  100 mg Oral Daily Ricardo Woodson MD   100 mg at 12/15/21 0843    guaiFENesin (MUCINEX) extended release tablet 600 mg  600 mg Oral BID Ricardo Woodson MD   600 mg at 12/16/21 0744    midodrine (PROAMATINE) tablet 7.5 mg  7.5 mg Oral TID WC Ricardo Woodson MD   7.5 mg at 12/16/21 1212    ipratropium-albuterol (DUONEB) nebulizer solution 3 mL  3 mL Inhalation Q4H WA Ricardo Woodson MD   3 mL at 12/16/21 1454    sodium chloride flush 0.9 % injection 5-40 mL  5-40 mL IntraVENous 2 times per day Mireya Zaman MD   10 mL at 12/15/21 2113    sodium chloride flush 0.9 % injection 5-40 mL  5-40 mL IntraVENous PRN Mireya Zaman MD        0.9 % sodium chloride infusion  25 mL IntraVENous PRN Mireya Zaman MD   Stopped at 12/13/21 0944    acetaminophen (TYLENOL) tablet 650 mg  650 mg Oral Q4H PRN Mireya Zaman MD   650 mg at 12/16/21 1108    ondansetron (ZOFRAN-ODT) disintegrating tablet 4 mg  4 mg Oral Q8H PRN Orlando Johnson MD        Or    ondansetron (ZOFRAN) injection 4 mg  4 mg IntraVENous Q6H PRN MireyaMD Toshia Ovalle potassium chloride (KLOR-CON M) extended release tablet 40 mEq  40 mEq Oral PRN Naeem Sanchez MD        Or    potassium bicarb-citric acid (EFFER-K) effervescent tablet 40 mEq  40 mEq Oral PRN Naeem Sanchez MD        Or    potassium chloride 10 mEq/100 mL IVPB (Peripheral Line)  10 mEq IntraVENous PRN Naeem Sanchez MD        ferrous sulfate (IRON 325) tablet 325 mg  325 mg Oral Daily Naeem Sanchez MD   325 mg at 12/16/21 0744    atorvastatin (LIPITOR) tablet 10 mg  10 mg Oral Daily Naeem Sanchez MD   10 mg at 12/16/21 0744    cefTRIAXone (ROCEPHIN) 1000 mg IVPB in 50 mL D5W minibag  1,000 mg IntraVENous Q24H Naeem Sanchez MD   Stopped at 12/16/21 0820    doxycycline monohydrate (MONODOX) capsule 100 mg  100 mg Oral 2 times per day Naeem Sanchez MD   100 mg at 12/16/21 0744    apixaban (ELIQUIS) tablet 5 mg  5 mg Oral BID Naeem Sanchez MD   5 mg at 12/16/21 0744    lidocaine 1 % injection 20 mL  20 mL IntraDERmal Once Sal Luo MD           Allergies:  Patient has no known allergies. Social History:   reports that he has been smoking cigarettes. He has been smoking about 0.50 packs per day. He has quit using smokeless tobacco. He reports current drug use. Frequency: 14.00 times per week. Drug: Marijuana Saud Blow). He reports that he does not drink alcohol. Family History: family history includes Cancer in his father. REVIEW OF SYSTEMS:    Constitutional: No fever or chills.  No night sweats, no weight loss   Eyes: No eye discharge, double vision, or eye pain   HEENT: negative for sore mouth, sore throat, hoarseness and voice change   Respiratory: negative for cough , sputum, ++dyspnea, wheezing, hemoptysis, chest pain   Cardiovascular: negative for chest pain, ++dyspnea, palpitations, orthopnea, PND   Gastrointestinal: negative for nausea, vomiting, diarrhea, constipation, abdominal pain, Dysphagia, hematemesis and hematochezia   Genitourinary: negative for frequency, dysuria, nocturia, urinary incontinence, and hematuria   Integument: negative for rash, skin lesions, bruises. Hematologic/Lymphatic: negative for easy bruising, bleeding, lymphadenopathy, or petechiae   Endocrine: negative for heat or cold intolerance,weight changes, change in bowel habits and hair loss   Musculoskeletal: negative for myalgias, arthralgias, pain, joint swelling,and bone pain   Neurological: negative for headaches, dizziness, seizures, weakness, numbness    PHYSICAL EXAM:      BP 95/70   Pulse 99   Temp 98 °F (36.7 °C) (Oral)   Resp 18   Ht 5' 11\" (1.803 m)   Wt 158 lb 11.7 oz (72 kg)   SpO2 97%   BMI 22.14 kg/m²    Temp (24hrs), Av.2 °F (36.8 °C), Min:97.7 °F (36.5 °C), Max:98.6 °F (37 °C)    General appearance - well appearing, no in pain or distress   Mental status - alert and cooperative   Eyes - pupils equal and reactive, extraocular eye movements intact   Ears - bilateral TM's and external ear canals normal   Mouth - mucous membranes moist, pharynx normal without lesions   Neck - supple, no significant adenopathy   Lymphatics - no palpable lymphadenopathy, no hepatosplenomegaly   Chest -decreased breathing sound.   Heart - normal rate, regular rhythm, normal S1, S2, no murmurs  Abdomen - soft, nontender, nondistended, no masses or organomegaly   Neurological - alert, oriented, normal speech, no focal findings or movement disorder noted   Musculoskeletal - no joint tenderness, deformity or swelling   Extremities - peripheral pulses normal, no pedal edema, no clubbing or cyanosis   Skin - normal coloration and turgor, no rashes, no suspicious skin lesions noted ,    DATA:    Labs:   CBC:   Recent Labs     12/15/21  0636 21  0533   WBC 3.1* 2.7*   HGB 10.1* 10.3*   HCT 29.8* 30.0*    190     BMP:   Recent Labs     12/15/21  0636 21  0533   * 136   K 4.3 4.2   CO2 24 27   BUN 13 12   CREATININE 1.08 0.92   LABGLOM >60 >60   GLUCOSE 91 96     PT/INR: No results for input(s): PROTIME, INR in the last 72 hours. IMAGING DATA:  XR CHEST PORTABLE   Final Result   Right pleural pigtail catheter remains in place with slight decrease in size   of the right pneumothorax. XR CHEST PORTABLE   Final Result   A left [id number]-Xgxv was noted with the distal tip in the superior vena cava. Mild volume loss of the right hemithorax secondary to a right pneumothorax   with 4.2 cm of separation at the right apex. The pneumothorax has progressed   in size from 12/15/2021, 0806 hours. Previously, it measured 3.6 cm of   separation. Unchanged right hilar mass was noted with post obstructive   pneumonia-atelectasis. The pigtail catheter is unchanged on the right. The other lower right-sided   chest tube has been removed since the earlier exam.         XR CHEST PORTABLE   Final Result   Similar appearance of 2 right chest tubes with slight decrease in size of a   small to moderate pneumothorax. XR CHEST PORTABLE   Final Result   New pleural pigtail catheter in the upper right chest.  Unchanged large bore   right chest tube. Moderate right pneumothorax is not significantly changed. IR CHEST TUBE INSERTION   Final Result   Successful fluoroscopic guided placement of a 10 [place] right pleural pigtail   chest tube         CT CHEST W WO CONTRAST   Final Result   Similar moderate to large right pneumothorax. Subsequent pigtail chest tube   placed using fluoroscopic guidance. RECOMMENDATIONS:   Unavailable         XR CHEST PORTABLE   Final Result   Overall no significant interval change. Right chest tube remains in place   with persistent moderate right pneumothorax. XR CHEST PORTABLE   Final Result   Stable moderate right pneumothorax and right subcutaneous emphysema. XR CHEST PORTABLE   Final Result   Stable appearance of right-sided large pneumothorax with chest tube stable in   position.          XR CHEST PORTABLE   Final Result   Slight improvement of large right pneumothorax with chest tube in place. XR CHEST PORTABLE   Final Result   Stable examination. XR CHEST PORTABLE   Final Result   Addendum 1 of 1   ADDENDUM:   Compared with the chest x-ray there is been significant interval    improvement. Please disregard original impression. Discussed with Dr. Rancho Rasmussen at 10:20    p.m. Final   Increased right pneumothorax despite new chest tube. Increased airspace   disease on the right. RECOMMENDATION:   The findings were sent to the [facility] at 10:19   pm on 12/8/2021to be communicated to a licensed caregiver. XR CHEST PORTABLE    (Results Pending)       Primary Problem  Pneumothorax    Active Hospital Problems    Diagnosis Date Noted    Severe malnutrition ([facility] [address] [E43] 12/15/2021    Pneumothorax [J93.9] 08/27/2021    COPD (chronic obstructive pulmonary disease) ([facility] [address] [J44.9] 08/27/2021    Iron deficiency anemia [D50.9] 08/27/2021    PAF (paroxysmal atrial fibrillation) ([facility] [address] [I48.0] 08/27/2021    Lung cancer, primary, with metastasis from lung to other site ([facility] [address] [C34.90]      IMPRESSION:   1. Metastatic small cell cancer currently on Lurbinectedin  2. 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 Large right pneumothorax, with recent CT scan showing increase compared to the prior scan, s/p chest tube placed  4. COPD  5. Anemia    RECOMMENDATIONS:  1. I reviewed laboratory data, imaging studies, diagnosis, treatment recommendations  2. Discussed results of chest x-ray from today. Slight improvement of pneumothorax  3. Review results of CT chest  4. No plans for in-house chemotherapy. 5. CT scans show stable status of patient small cell lung cancer  6. Patient encouraged to work with PT OT  7. We will follow    Discussed with patient and Nurse. Thank you for asking us to see this patient.     Millie Urena MD      This note is created with the assistance of a speech recognition program.  While intending to generate a document that actually reflects the content of the visit, the document can still have some errors including those of syntax and sound a like substitutions which may escape proof reading. It such instances, actual meaning can be extrapolated by contextual diversion. \

## 2022-07-21 NOTE — DISCHARGE SUMMARY
Discharge Summary       PATIENT ID: Edel Munson  MRN: [id number]   YOB: 1940    DATE OF ADMISSION: 7/28/2021  4:46 PM    DATE OF DISCHARGE: 8/10/2021    PRIMARY CARE PROVIDER: Camila Giles MD     ATTENDING PHYSICIAN: Sal Garcia MD   DISCHARGING PROVIDER: Sal Garcia MD    To contact this individual call [phone] and ask the  to page. If unavailable ask to be transferred the Adult Hospitalist Department. CONSULTATIONS: IP CONSULT TO ONCOLOGY  IP CONSULT TO GENERAL SURGERY    PROCEDURES/SURGERIES: * No surgery found *    ADMITTING DIAGNOSES & HOSPITAL COURSE:   Per hem onc;          Assessment and Plan      79 y/o woman with metastatic melanoma and ECOG performance status = 3.      1. Melanoma: agree with plans for hospice. Case d/w  and Dr. Lissett Butts. Appreciate excellent hospitalist care. Will sign off.      Thank you for allowing us to participate in the care of this very pleasant patient.     MD Louisa Bloom per notes:       Admission Summary:   Austen Schafer a 78 y. o. female with right breast cancer s/p R axillary lymph node dissection and excision of right breast implant for adenopathy and mass on 7/19 with path + for metastatic melanoma, GERD, dyslipidemia, HTN, TIA and hx malignant melanoma who presents with dyspnea and pleuritic chest pain for the past several days.          Interval history / Subjective:   8/9/2021 :   Poor functional status   As noted by DR. Wagoner: \"Sorry to see her not thriving\"8/6 note   For goals of care conference with  later today      12:57 PM ; have entered a ACP note     HOSPICE BOUND 8/10/2021          Assessment & Plan:   Nausea and vomiting, suspect gastritis, resolved. --Antiemetics,antiacids. --CT A/P unremarkable for any acute processes.   - better     HOSPICE BOUND 8/10/2021      Acute pulmonary emboli  --CT of the lung showed small burden of bilateral acute versus subacute pleural embolism, new [FreeTextEntry1] : 68 yo M\par complicated medical history\par previously orchiectomy due to scrotal infection, that was complicated with an abscess and requiring drainage and prolonged abx treatment\par \par recently a large distal ureteral stone\par stent would not advance, drained with NT\par s/p antegrade ureteroscopy and laser lithotripsy with stent placement\par stent removed after\par \par he is here for follow up\par feeling well\par new ultrasound: no stones, no hydronephrosis\par on exam, skin incision healing\par \par plan:\par - next visit with new ultrasound in 4 months multiple subcentimeter trach right lung nodules which may represent metastatic disease.  -Hemodynamically stable, no oxygen requirement, no right heart strain  -Continue Lovenox 1 mg/kg every 12 hourly. Lovenox is preferred anticoagulants in the setting of cancer (metastatic melanoma) however patient is not inclined to continue with injections, so patient will probably for be discharged on NOACs. -- Hematology/oncology consulted, recommended Xarlto on discharge.       HOSPICE BOUND 8/10/2021         SIRS (fever and tachycardia) possible etiologies include acute PE, hypovolemia. No definitive source of infection. Fever did not recur since 7/29. No infection/sepsis. Antibiotics discontinued on 8/1  -after admission, patient developed tachycardia and Tmax 100.8,no recurrence   -Procalcitonin level <0.05. Lactic acid 0.9. Blood culture in process,negative thus far. UA ,negative. Would like to obtain culture from right axillary drain.  -Patient was started on empiric antibiotics with vancomycin, Levaquin and tobramycin. DC tobramycin, continue with vancomycin Levaquin. Infection is looking less likely  --CT abdomen pelvis no intra-abdominal evidence of infection. --Temp of 101 a.m. of 8/2, no recurrence. Continue to monitor. --Low-grade fever again on 8/4, sending some infectious work-up including pro Rik, culture from right axillarybreast drain. - t max 8/9/2021 : 99.0. For cxr and blood cult  - neg form 8/6 where temp was 102. ; t max 8/9/2021 : 99.6     HOSPICE BOUND 8/10/2021      #Hx R breast cancer   #Metastatic melanoma  -Outpatient follow up ,may need CT PET scan    HOSPICE BOUND 8/10/2021      #Right axillary drain present PTA. She recently had right axillary lymph node node dissection and right breast implant explantation by Dr. Maia Obrien. --Drain does not look purulent.   Sending culture as she is spiking low-grade intermittent fever  - stable on exam 8/5/2021  Fever concerning 8/6/2021    #HTN  -continue hctz, cardura and metoprolol      BP Readings from Last 1 Encounters:   08/09/21 (!) 168/74         #Dyslipidemia  -continue pravastatin and zetia     #Hyponatremia likely due to hypovolemia: Improved with IV fluids.   - last S Na 129 8/7          DVT prophylaxis: Full dose Lovenox  Daisy Arrington,   Code: Full  Disposition: SNF versus IPR     FUNCTIONAL STATUS PRIOR TO HOSPITALIZATION Ambulatory with Use of Assistive Devices                DISCHARGE DIAGNOSES / PLAN:      1.  AS ABOVE  HOSPICE BOUND 8/10/2021      ADDIT FOLLOW UP APPOINTMENTS:    Follow-up Information     Follow up With Specialties Details Why Contact Info    [facility]  Call in 1 day Please contact if you have not heard from them within 24 hours of your discharge. [address]  [place], [address] [place] [address] Ave  [phone]    [address] [place] [address],[address]    Waleska Del Rosario MD Family Medicine   [address] [place] [address] [address] [phone]               DIET: AS YOGESH         DISCHARGE MEDICATIONS:  Current Discharge Medication List      CONTINUE these medications which have NOT CHANGED    Details   pantoprazole (PROTONIX) 40 mg tablet Take 1 Tablet by mouth Before breakfast and dinner.   Qty: 30 Tablet, Refills: 1         STOP taking these medications       metoprolol tartrate (LOPRESSOR) 25 mg tablet Comments:   Reason for Stopping:         ascorbic acid, vitamin C, (Vitamin C) 500 mg tablet Comments:   Reason for Stopping:         cyanocobalamin (Vitamin B-12) 100 mcg tablet Comments:   Reason for Stopping:         potassium 99 mg tablet Comments:   Reason for Stopping:         calcium carbonate (CALTREX) 600 mg calcium (1,500 mg) tablet Comments:   Reason for Stopping:         cholecalciferol (VITAMIN D3) (5000 Units/125 mcg) tab tablet Comments:   Reason for Stopping:         lactobacillus rhamnosus gg 10 billion cell (Culturelle) 10 billion cell capsule Comments:   Reason for Stopping:         pravastatin (PRAVACHOL) 20 mg tablet Comments:   Reason for Stopping:         hydroCHLOROthiazide (HYDRODIURIL) 25 mg tablet Comments:   Reason for Stopping:         ibuprofen (MOTRIN) 400 mg tablet Comments:   Reason for Stopping:         ZINC PO Comments:   Reason for Stopping:         fish oil-omega-3 fatty acids (Fish Oil) 340-1,000 mg capsule Comments:   Reason for Stopping:         ezetimibe (Zetia) 10 mg tablet Comments:   Reason for Stopping:         atorvastatin (LIPITOR) 20 mg tablet Comments:   Reason for Stopping:         doxazosin (CARDURA) 1 mg tablet Comments:   Reason for Stopping:         MULTIVITAMIN PO Comments:   Reason for Stopping:         aspirin 81 mg tablet Comments:   Reason for Stopping:                 NOTIFY YOUR PHYSICIAN FOR ANY OF THE FOLLOWING:   Fever over 101 degrees for 24 hours. Chest pain, shortness of breath, fever, chills, nausea, vomiting, diarrhea, change in mentation, falling, weakness, bleeding. Severe pain or pain not relieved by medications. Or, any other signs or symptoms that you may have questions about.     DISPOSITION:    Home With:   OT  PT  HH  RN       Long term SNF/Inpatient Rehab    Independent/assisted living   X Hospice    Other:      Catheters/lines (plus indication)    Rossi     PICC     DRAIN:  RT CHETS OP SITE DRAIN, ( RT BREAST EXPLANT SITE)     None      Code status     Full code    X DNR      PHYSICAL EXAMINATION AT DISCHARGE:  Visit Vitals  BP (!) 154/77 (BP 1 Location: Left arm)   Pulse 99   Temp 99.3 °F (37.4 °C)   Resp 24   Ht 5' 3\" (1.6 m)   Wt 64.7 kg (142 lb 11.2 oz)   SpO2 91%   BMI 25.28 kg/m²          CHRONIC MEDICAL DIAGNOSES:  Problem List as of 8/10/2021 Date Reviewed: 7/28/2021        Codes Class Noted - Resolved    Pleuritic chest pain, left ICD-10-CM: R07.81  ICD-9-CM: 786.52  7/28/2021 - Present        Pulmonary emboli ([facility] [address] ICD-10-CM: I26.99  ICD-9-CM: 415.19  7/28/2021 - Present        Lymphadenopathy ICD-10-CM: R59.1  ICD-9-CM: 785.6  7/18/2021 - Present        Anaphylactic shock ICD-10-CM: T78. 2XXA  ICD-9-CM: 995.0  5/17/2021 - Present        UTI (urinary tract infection) ICD-10-CM: N39.0  ICD-9-CM: 599.0  5/16/2021 - Present        Hypokalemia ICD-10-CM: E87.6  ICD-9-CM: 276.8  5/16/2021 - Present        Hypotension ICD-10-CM: I95.9  ICD-9-CM: 458.9  5/15/2021 - Present        Drug-induced anaphylaxis ICD-10-CM: T78. [place]  ICD-9-CM: 995.0, E947.9  5/15/2021 - Present        Axillary adenopathy ICD-10-CM: R59.0  ICD-9-CM: 785.6  3/24/2021 - Present        Recurrent malignant melanoma of skin (HCC) ICD-10-CM: C43.9  ICD-9-CM: 172.9  9/23/2020 - Present        Personal history of malignant neoplasm of breast ICD-10-CM: Z85.3  ICD-9-CM: V10.3  2/20/2015 - Present        Deformity of reconstructed breast ICD-10-CM: N65.0  ICD-9-CM: 612.0  2/20/2015 - Present        Disproportion of reconstructed breast ICD-10-CM: N65.1  ICD-9-CM: 612.1  2/20/2015 - Present        Ptosis of breast ICD-10-CM: N64.81  ICD-9-CM: 611.81  2/20/2015 - Present        Spinal stenosis ICD-10-CM: M48.00  ICD-9-CM: 724.00  2/22/2012 - Present              Greater than 20 minutes were spent with the patient on counseling and coordination of care    Signed:   Tova Renee MD  8/10/2021  8:52 AM

## 2023-01-23 NOTE — CONSULTS
Surgery Consult    Subjective:      Dylon Zarate is a 78 y.o. female who we are being asked to see. The patient had a axillary lymphadenectomy approximately 2 weeks ago. He came to the office where Dr. Jenniffer Fernando was concerned that she may have a PE and was therefore sent to the emergency department. She did have a PE and is currently being treated for that. Patient has no specific questions for general surgery.   Per the chart may be having some nausea and vomiting  Patient Active Problem List    Diagnosis Date Noted    Pleuritic chest pain, left 07/28/2021    Pulmonary emboli ([address] 07/28/2021    Lymphadenopathy 07/18/2021    Anaphylactic shock 05/17/2021    UTI (urinary tract infection) 05/16/2021    Hypokalemia 05/16/2021    Hypotension 05/15/2021    Drug-induced anaphylaxis 05/15/2021    Axillary adenopathy 03/24/2021    Recurrent malignant melanoma of skin ([address] 09/23/2020    Personal history of malignant neoplasm of breast 02/20/2015    Deformity of reconstructed breast 02/20/2015    Disproportion of reconstructed breast 02/20/2015    Ptosis of breast 02/20/2015    Spinal stenosis 02/22/2012     Past Medical History:   Diagnosis Date    Arrhythmia     LBBB    Arthritis     SPINE    Axillary adenopathy 3/24/2021    Calculus of kidney 1990    Cancer ([address]     right breast X2 ; BCCA    Chronic pain     COVID-19 vaccine series completed     MODERNA3/31/21,4/28/21    Environmental allergies     GERD (gastroesophageal reflux disease)     Hypercholesterolemia     Hypertension     Lymphadenopathy     Nausea & vomiting     Other ill-defined conditions(799.89)     high cholesterol    Other ill-defined conditions(799.89)     NO BP/VENIPUNCTURES RIGHT ARM (POST LYMPH NODE DISSECTION)    Other ill-defined conditions(799.89)     MITRAL VALVE PROLAPSE    Pleuritic chest pain, left 7/28/2021    Recurrent malignant melanoma of skin ([address] 9/23/2020    Stroke ([address]     TIA 2009  (TAKING ASA)      Past Surgical History:   Procedure Laterality Date    HX BACK SURGERY  1998    l4-l5  ([facility]--[facility])    HX BREAST LUMPECTOMY  1999    right    HX BREAST RECONSTRUCTION Right 8/27/2014    Revision Right Reconstructed Breast performed by Janes Piper MD at [address] [place], [place] [address]wy Bilateral 2/20/2015    REVISION RIGHT RECONSTRUCTED BREAST/REMOVE TISSUE EXPANDERS/PLACE IMPLANT RIGHT/MASTOPEXY LEFT performed by Janes Piper MD at [address] HX BREAST RECONSTRUCTION  07/16/2021    Right axillary lymph node dissection and excision of right breast implant.     HX CATARACT REMOVAL      BILATERAL    HX DILATION AND CURETTAGE      HX GI  2006    COLONOSCOPY    HX HEENT      BCCA REMOVED FROM FOREHEAD    HX LITHOTRIPSY  1991    HX MALIGNANT SKIN LESION EXCISION  10/12/2020    Wide excision of recurrent melanoma of the back    HX MASTECTOMY Right 2014    HX ORTHOPAEDIC  2005    left bunionectomy    HX OTHER SURGICAL      BCCA REMOVED FROM BACK     HX MINAL AND BSO      HX TUBAL LIGATION      HX UROLOGICAL      kidney stone    HX UROLOGICAL  2016    BLADDER SLING      Social History     Tobacco Use    Smoking status: Never Smoker    Smokeless tobacco: Never Used   Substance Use Topics    Alcohol use: No      Family History   Problem Relation Age of Onset    Heart Disease Mother     Heart Disease Brother     Cancer Brother         PROSTATE    Cancer Father         LUNG--ASBESTOS EXPOSURE    Heart Disease Maternal Aunt     Heart Disease Brother     Heart Disease Brother     No Known Problems Daughter     No Known Problems Daughter     Anesth Problems Neg Hx       Current Facility-Administered Medications   Medication Dose Route Frequency    iohexoL (OMNIPAQUE) 240 mg iodine/mL solution 50 mL  50 mL Oral RAD ONCE    ondansetron (ZOFRAN) injection 2 mg  2 mg IntraVENous Q4H PRN    pantoprazole (PROTONIX) tablet 40 mg  40 mg Oral ACB    calcium carbonate (TUMS) chewable tablet 200 mg [elemental]  200 mg Oral TID WITH MEALS    sodium chloride (NS) flush 5-10 mL  5-10 mL IntraVENous PRN    levoFLOXacin (LEVAQUIN) 750 mg in D5W IVPB  750 mg IntraVENous Q24H    Vancomycin dosing per pharmacy   Other Rx Dosing/Monitoring    vancomycin (VANCOCIN) 1250 mg in  ml infusion  1,250 mg IntraVENous Q24H    enoxaparin (LOVENOX) injection 60 mg  1 mg/kg SubCUTAneous Q12H    oxyCODONE-acetaminophen (PERCOCET) 5-325 mg per tablet 1 Tablet  1 Tablet Oral Q4H PRN    acetaminophen (TYLENOL) tablet 650 mg  650 mg Oral Q6H PRN    Or    acetaminophen (TYLENOL) suppository 650 mg  650 mg Rectal Q6H PRN    ezetimibe (ZETIA) tablet 10 mg  10 mg Oral DAILY    doxazosin (CARDURA) tablet 2 mg  2 mg Oral DAILY    hydroCHLOROthiazide (HYDRODIURIL) tablet 25 mg  25 mg Oral DAILY    metoprolol tartrate (LOPRESSOR) tablet 50 mg  50 mg Oral BID    aspirin delayed-release tablet 81 mg  81 mg Oral DAILY    pravastatin (PRAVACHOL) tablet 20 mg  20 mg Oral QHS      Allergies   Allergen Reactions    Cephalexin Anaphylaxis     POA 5/15/21    Losartan Anaphylaxis    Other Medication Anaphylaxis     Allergy shot     Augmentin [Amoxicillin-Pot Clavulanate] Rash    Tetracycline Rash        Review of Systems:    Pertinent items are noted in the History of Present Illness.     Objective:        Visit Vitals  BP (!) 143/70 (BP 1 Location: Left upper arm, BP Patient Position: At rest)   Pulse 72   Temp 98.3 °F (36.8 °C)   Resp 18   Ht 5' 3\" (1.6 m)   Wt 142 lb 6.7 oz (64.6 kg)   SpO2 92%   BMI 25.23 kg/m²       Physical Exam:  GENERAL: alert, cooperative, no distress, appears stated age, EYE: negative, LYMPH NODES: Axillary lymphadenectomy site appears clean YOLANDA serous, abdomen soft nontender nondistended  EXTREMITIES:  no edema, SKIN: Normal., NEUROLOGIC: negative, PSYCH: non focal    Imaging:  images and reports reviewed  CT- Right middle lobe and lower lobe pulmonary nodules. 2. Small right and trace left pleural effusions. 3. Nonspecific hypoattenuating lesions of the liver. 4. Unchanged hypoattenuating lesion of the body of the pancreas. 5. Colonic diverticula. Cecal bascule. Lab/Data Review: All lab results for the last 24 hours reviewed. Recent Results (from the past 24 hour(s))   VANCOMYCIN, RANDOM    Collection Time: 07/31/21 12:57 AM   Result Value Ref Range    Vancomycin, random 83.3 UG/ML   METABOLIC PANEL, BASIC    Collection Time: 07/31/21 12:57 AM   Result Value Ref Range    Sodium 135 (L) 136 - 145 mmol/L    Potassium 4.0 3.5 - 5.1 mmol/L    Chloride 103 97 - 108 mmol/L    CO2 26 21 - 32 mmol/L    Anion gap 6 5 - 15 mmol/L    Glucose 101 (H) 65 - 100 mg/dL    BUN 10 6 - 20 MG/DL    Creatinine 0.62 0.55 - 1.02 MG/DL    BUN/Creatinine ratio 16 12 - 20      GFR est AA >60 >60 ml/min/1.73m2    GFR est non-AA >60 >60 ml/min/1.73m2    Calcium 9.0 8.5 - 10.1 MG/DL   CBC WITH AUTOMATED DIFF    Collection Time: 07/31/21 12:57 AM   Result Value Ref Range    WBC 4.5 3.6 - 11.0 K/uL    RBC 2.94 (L) 3.80 - 5.20 M/uL    HGB 8.8 (L) 11.5 - 16.0 g/dL    HCT 27.6 (L) 35.0 - 47.0 %    MCV 93.9 80.0 - 99.0 FL    MCH 29.9 26.0 - 34.0 PG    MCHC 31.9 30.0 - 36.5 g/dL    RDW 13.2 11.5 - 14.5 %    PLATELET 320 (H) 879 - 400 K/uL    MPV 9.8 8.9 - 12.9 FL    NRBC 0.0 0  WBC    ABSOLUTE NRBC 0.00 0.00 - 0.01 K/uL    NEUTROPHILS 52 32 - 75 %    LYMPHOCYTES 30 12 - 49 %    MONOCYTES 14 (H) 5 - 13 %    EOSINOPHILS 4 0 - 7 %    BASOPHILS 0 0 - 1 %    IMMATURE GRANULOCYTES 0 0.0 - 0.5 %    ABS. NEUTROPHILS 2.3 1.8 - 8.0 K/UL    ABS. LYMPHOCYTES 1.3 0.8 - 3.5 K/UL    ABS. MONOCYTES 0.6 0.0 - 1.0 K/UL    ABS. EOSINOPHILS 0.2 0.0 - 0.4 K/UL    ABS. BASOPHILS 0.0 0.0 - 0.1 K/UL    ABS. IMM.  GRANS. 0.0 0.00 - 0.04 K/UL    DF AUTOMATED         Assessment: Plan   Status post right axillary lymphadenectomy 2 weeks ago  Appears to be healing well  CT scan is unremarkable  Continue treatment per medical team stated

## (undated) DEVICE — BLADE ASSEMB CLP HAIR FINE --

## (undated) DEVICE — NEEDLE HYPO 25GA L1.5IN BVL ORIENTED ECLIPSE

## (undated) DEVICE — DRAIN SURG 19FR 0.25IN SIL RND W/ TRCR INDIC DOT RADPQ FULL

## (undated) DEVICE — PREP SKN CHLRAPRP APL 26ML STR --

## (undated) DEVICE — STERILE POLYISOPRENE POWDER-FREE SURGICAL GLOVES WITH EMOLLIENT COATING: Brand: PROTEXIS

## (undated) DEVICE — ROCKER SWITCH PENCIL BLADE ELECTRODE, HOLSTER: Brand: EDGE

## (undated) DEVICE — SUTURE MCRYL SZ 4-0 L27IN ABSRB UD L19MM PS-2 1/2 CIR PRIM Y426H

## (undated) DEVICE — DBD-PACK,LAPAROTOMY,2 REINFORCED GOWNS: Brand: MEDLINE

## (undated) DEVICE — SUTURE VCRL SZ 3-0 L27IN ABSRB UD L26MM SH 1/2 CIR J416H

## (undated) DEVICE — INFECTION CONTROL KIT SYS

## (undated) DEVICE — SOLUTION IRRIG 1000ML 0.9% SOD CHL USP POUR PLAS BTL

## (undated) DEVICE — DRAPE,UTILTY,TAPE,15X26, 4EA/PK: Brand: MEDLINE

## (undated) DEVICE — REM POLYHESIVE ADULT PATIENT RETURN ELECTRODE: Brand: VALLEYLAB

## (undated) DEVICE — PENCIL SMK EVAC 10 FT BLADE ELECTRD ROCKER FOR TELSCP

## (undated) DEVICE — DERMABOND SKIN ADH 0.7ML -- DERMABOND ADVANCED 12/BX

## (undated) DEVICE — STRAP,POSITIONING,KNEE/BODY,FOAM,4X60": Brand: MEDLINE

## (undated) DEVICE — SUTURE PERMAHAND SZ 2-0 L18IN NONABSORBABLE BLK L26MM PS 1588H

## (undated) DEVICE — DEVICE SEAL L23CM NANO COAT MARYLAND JAW OPN DIV LIGASURE

## (undated) DEVICE — BASIN ST MAJOR-NO CAUTERY: Brand: MEDLINE INDUSTRIES, INC.

## (undated) DEVICE — SPONGE GZ W4XL4IN COT 12 PLY TYP VII WVN C FLD DSGN

## (undated) DEVICE — SUT ETHLN 3-0 18IN PS2 BLK --

## (undated) DEVICE — SURGICAL PROCEDURE PACK BASIN MAJ SET CUST NO CAUT

## (undated) DEVICE — RESERVOIR,SUCTION,100CC,SILICONE: Brand: MEDLINE

## (undated) DEVICE — PACK,LAPAROTOMY,2 REINFORCED GOWNS: Brand: MEDLINE

## (undated) DEVICE — Device

## (undated) DEVICE — SUTURE VCRL SZ 2-0 L27IN ABSRB UD L26MM SH 1/2 CIR J417H

## (undated) DEVICE — SYR 10ML LUER LOK 1/5ML GRAD --

## (undated) DEVICE — TOWEL SURG W17XL27IN STD BLU COT NONFENESTRATED PREWASHED

## (undated) DEVICE — GARMENT,MEDLINE,DVT,INT,CALF,MED, GEN2: Brand: MEDLINE

## (undated) DEVICE — SOLUTION IV 1000ML 0.9% SOD CHL

## (undated) DEVICE — DRAIN SURG 19FR 100% SIL RADPQ RND CHN FULL FLUT